# Patient Record
Sex: FEMALE | Race: WHITE | Employment: FULL TIME | URBAN - METROPOLITAN AREA
[De-identification: names, ages, dates, MRNs, and addresses within clinical notes are randomized per-mention and may not be internally consistent; named-entity substitution may affect disease eponyms.]

---

## 2017-01-03 ENCOUNTER — VIRTUAL VISIT (OUTPATIENT)
Dept: FAMILY MEDICINE | Facility: CLINIC | Age: 32
End: 2017-01-03
Payer: COMMERCIAL

## 2017-01-03 DIAGNOSIS — F41.1 GAD (GENERALIZED ANXIETY DISORDER): Primary | ICD-10-CM

## 2017-01-03 PROCEDURE — 99441 ZZC PHYSICIAN TELEPHONE EVALUATION 5-10 MIN: CPT | Performed by: FAMILY MEDICINE

## 2017-01-03 ASSESSMENT — ANXIETY QUESTIONNAIRES
6. BECOMING EASILY ANNOYED OR IRRITABLE: NOT AT ALL
3. WORRYING TOO MUCH ABOUT DIFFERENT THINGS: SEVERAL DAYS
7. FEELING AFRAID AS IF SOMETHING AWFUL MIGHT HAPPEN: NOT AT ALL
1. FEELING NERVOUS, ANXIOUS, OR ON EDGE: SEVERAL DAYS
GAD7 TOTAL SCORE: 3
2. NOT BEING ABLE TO STOP OR CONTROL WORRYING: NOT AT ALL
IF YOU CHECKED OFF ANY PROBLEMS ON THIS QUESTIONNAIRE, HOW DIFFICULT HAVE THESE PROBLEMS MADE IT FOR YOU TO DO YOUR WORK, TAKE CARE OF THINGS AT HOME, OR GET ALONG WITH OTHER PEOPLE: NOT DIFFICULT AT ALL
5. BEING SO RESTLESS THAT IT IS HARD TO SIT STILL: NOT AT ALL

## 2017-01-03 ASSESSMENT — PATIENT HEALTH QUESTIONNAIRE - PHQ9: 5. POOR APPETITE OR OVEREATING: SEVERAL DAYS

## 2017-01-03 NOTE — PATIENT INSTRUCTIONS
Thank you for choosing [facility].  You may be receiving a survey in the mail from Femi Gregory regarding your visit today.  Please take a few minutes to complete and return the survey to let us know how we are doing.      If you have questions or concerns, please contact us via [employer] or you can contact your care team at [phone].    Our Clinic hours are:  Monday 6:40 am  to 7:00 pm  Tuesday -Friday 6:40 am to 5:00 pm    The [place] outpatient lab hours are:  Monday - Friday 6:10 am to 4:45 pm  Saturdays 7:00 am to 11:00 am  Appointments are required, call [phone]    If you have clinical questions after hours or would like to schedule an appointment,  call the clinic at [phone].

## 2017-01-03 NOTE — PROGRESS NOTES
SUBJECTIVE:                                                    Grace Dominguez is 31 year old female   Chief Complaint   Patient presents with     Refill Request     medications pending     Recheck Medication     Sertraline     Medication Followup of Sertraline    Taking Medication as prescribed: yes    Side Effects:  None.  A little tired on the 25mg    Medication Helping Symptoms:  yes     Two weeks ago started Zoloft 25mg for mood and checking in today for mood and side effects.    Problem list and histories reviewed & adjusted, as indicated.  Patient Active Problem List    Diagnosis Date Noted     ARDEN (generalized anxiety disorder) 12/26/2016     Priority: Medium     mirena IUD 11/28/2016     Priority: Medium     Mirena IUD placed today 11/28/16 by Faith Miguel MD  LOT# [id number]  Exp: 08/19         Pityriasis rosea 10/14/2016     Priority: Medium     Family history of congenital anomaly 04/15/2016     Priority: Medium     Daughter had hydrocephalus and developmental delay related to infection       Mild dysplasia of cervix 04/15/2016     Priority: Medium     10/14/09 LSIL  2/23/10 [place]: BRITTNY 1.   4/15/16 LSIL/ + HR HPV @ age 31. 12w1d pregnant. Plan colp   5/13/16: [place] visually normal, no biopsies taken. Plan cotest at 1 year.   12/20/16:Pap--NIL, neg HPV. Plan: pap and HPV due in 3 years.        Current Outpatient Prescriptions   Medication     sertraline (ZOLOFT) 50 MG tablet     Prenatal Vit-Fe Fumarate-FA (PRENATAL MULTIVITAMIN  PLUS IRON) 27-0.8 MG TABS     No current facility-administered medications for this visit.      No Active Allergies  Assessment and Plan   Grace was seen today for refill request and recheck medication.    Diagnoses and all orders for this visit:    ARDEN (generalized anxiety disorder): improving  -continue zoloft and increase to 50mg daily  Can switch to take medication at bedtime    Total of 5 minutes was spent on phone visit with patient. Options for treatment and/or follow-up  care were reviewed with the patient. Patient was engaged and actively involved in the decision making process. She verbalized understanding of the options discussed and was satisfied with the final plan.    Return to clinic in 3-4 weeks for medication dosing  Napoleon White MD  [facility]

## 2017-01-04 ASSESSMENT — PATIENT HEALTH QUESTIONNAIRE - PHQ9: SUM OF ALL RESPONSES TO PHQ QUESTIONS 1-9: 2

## 2017-01-04 ASSESSMENT — ANXIETY QUESTIONNAIRES: GAD7 TOTAL SCORE: 3

## 2017-02-27 ENCOUNTER — MYC REFILL (OUTPATIENT)
Dept: FAMILY MEDICINE | Facility: CLINIC | Age: 32
End: 2017-02-27

## 2017-02-27 DIAGNOSIS — F41.1 GAD (GENERALIZED ANXIETY DISORDER): ICD-10-CM

## 2017-02-28 NOTE — TELEPHONE ENCOUNTER
Message from [employer]:  Original authorizing provider: MD Milo Velizen CORDELL Dominguez would like a refill of the following medications:  sertraline (ZOLOFT) 50 MG tablet [Napoleon White MD]    Preferred pharmacy: [facility] [address] [place] [address] AT [facility] OF [address] [place] [address]    Comment:  I am near the end of my final refill for this medication, and would like to have additional refills sent to the [facility] if possible!

## 2017-03-28 ENCOUNTER — HOSPITAL ENCOUNTER (EMERGENCY)
Facility: CLINIC | Age: 32
Discharge: HOME | End: 2017-03-28
Attending: PHYSICIAN ASSISTANT | Admitting: PHYSICIAN ASSISTANT
Payer: COMMERCIAL

## 2017-03-28 ENCOUNTER — APPOINTMENT (OUTPATIENT)
Dept: GENERAL RADIOLOGY | Facility: CLINIC | Age: 32
End: 2017-03-28
Attending: PHYSICIAN ASSISTANT
Payer: COMMERCIAL

## 2017-03-28 VITALS
RESPIRATION RATE: 16 BRPM | TEMPERATURE: 97.6 F | HEART RATE: 79 BPM | OXYGEN SATURATION: 98 % | SYSTOLIC BLOOD PRESSURE: 138 MMHG | DIASTOLIC BLOOD PRESSURE: 85 MMHG | BODY MASS INDEX: 25.75 KG/M2 | WEIGHT: 150 LBS

## 2017-03-28 DIAGNOSIS — J06.9 VIRAL URI WITH COUGH: Primary | ICD-10-CM

## 2017-03-28 PROCEDURE — 99213 OFFICE O/P EST LOW 20 MIN: CPT | Mod: 25

## 2017-03-28 PROCEDURE — 71020 XR CHEST 2 VW: CPT

## 2017-03-28 PROCEDURE — 99213 OFFICE O/P EST LOW 20 MIN: CPT | Performed by: PHYSICIAN ASSISTANT

## 2017-03-28 RX ORDER — ALBUTEROL SULFATE 90 UG/1
2 AEROSOL, METERED RESPIRATORY (INHALATION) EVERY 4 HOURS PRN
Qty: 1 INHALER | Refills: 0 | Status: SHIPPED | OUTPATIENT
Start: 2017-03-28 | End: 2017-08-04

## 2017-03-28 RX ORDER — CODEINE PHOSPHATE AND GUAIFENESIN 10; 100 MG/5ML; MG/5ML
1 SOLUTION ORAL EVERY 4 HOURS PRN
Qty: 120 ML | Refills: 0 | Status: SHIPPED | OUTPATIENT
Start: 2017-03-28 | End: 2017-08-04

## 2017-03-28 NOTE — ED AVS SNAPSHOT
[facility] Emergency Department    [address] [facility] [address]    Phone:  [phone]    Fax:  [phone]                                       Grace Dominguez   MRN: [id number]    Department:  [facility] Emergency Department   Date of Visit:  3/28/2017           After Visit Summary Signature Page     I have received my discharge instructions, and my questions have been answered. I have discussed any challenges I see with this plan with the nurse or doctor.    ..........................................................................................................................................  Patient/Patient Representative Signature      ..........................................................................................................................................  Patient Representative Print Name and Relationship to Patient    ..................................................               ................................................  Date                                            Time    ..........................................................................................................................................  Reviewed by Signature/Title    ...................................................              ..............................................  Date                                                            Time

## 2017-03-28 NOTE — ED AVS SNAPSHOT
[facility] Emergency Department    [address] [facility] [address]    Phone:  [phone]    Fax:  [phone]                                       Grace Dominguez   MRN: [id number]    Department:  [facility] Emergency Department   Date of Visit:  3/28/2017           Patient Information     Date Of Birth          1985        Your diagnoses for this visit were:     Viral URI with cough        You were seen by Zaida Brunner PA-C.      Follow-up Information     Call Faith Miguel MD.    Specialty:  OB/Gyn    Why:  As needed, For persistent symptoms    Contact information:    [facility]  [address] [place] [address]  [phone]          Follow up with [facility] Emergency Department.    Specialty:  EMERGENCY MEDICINE    Why:  As needed, If symptoms worsen    Contact information:    [address] [place] [id number] [phone]    Additional information:    The medical center is located at   [address] [place] [address] (between [facility] and   [place] [address] in [place], four miles north   of [place]).        Discharge Instructions          * VIRAL RESPIRATORY ILLNESS w/ Wheezing [Adult]  You have an Upper Respiratory Illness (URI) caused by a virus. This illness is contagious during the first few days. It is spread through the air by coughing and sneezing or by direct contact (touching the sick person and then touching your own eyes, nose or mouth). When the infection causes a lot of irritation, the air passages can go into spasm. This causes wheezing and shortness of breath.    Most viral illnesses resolve within 7-10 days with rest and simple home remedies, although the illness may sometimes last for several weeks. Antibiotics will not kill a virus and are generally not prescribed for this condition.  HOME CARE:    If symptoms are severe, rest at home for the first 2-3 days. When resuming activity, don't let yourself become overly tired.    Avoid  exposure to cigarette smoke (yours or others).    You may use acetaminophen (Tylenol) 650-1000 mg every 6 hours or ibuprofen (Motrin, Advil) 600 mg every 6-8 hours with food to control pain, if you are able to take these medicines. [ NOTE : If you have chronic liver or kidney disease or ever had a stomach ulcer or GI bleeding, talk with your doctor before using these medicines.] (Aspirin should never be used in anyone under 18 years of age who is ill with a fever. It may cause severe liver damage.    Your appetite may be poor so a light diet is fine. Avoid dehydration by drinking 6-8 glasses of fluids per day (water, soft drinks, juices, tea, soup, etc.). Extra fluids will help loosen secretions in the nose and lungs.    Over-the-counter cold medicines will not shorten the duration of the illness, but may be helpful for the following symptoms: cough (Robitussin DM); sore throat (Chloraseptic lozenges or spray); nasal and sinus congestion (Actifed or Sudafed). [NOTE: Do not use decongestants if you have high blood pressure.]  FOLLOW UP with your doctor or as advised if you are not improving over the next week.  GET PROMPT MEDICAL ATTENTION if any of the following occur:    Cough with lots of colored sputum (mucus) or blood in your sputum    Chest pain, shortness of breath, wheezing or difficulty breathing    Severe headache; face, neck or ear pain    Fever over 101 F (38.3 C) for more than three days    Unable to swallow due to throat pain    2372-8059 [place] [address]. All rights reserved. This information is not intended as a substitute for professional medical care. Always follow your healthcare professional's instructions.      24 Hour Appointment Hotline       To make an appointment at any [facility], call [phone] ([phone]). If you don't have a family doctor or clinic, we will help you find one. [facility] are conveniently located to serve the  needs of you and your family.             Review of your medicines      START taking        Dose / Directions Last dose taken    albuterol 108 (90 BASE) MCG/ACT Inhaler   Commonly known as:  albuterol   Dose:  2 puff   Quantity:  1 Inhaler        Inhale 2 puffs into the lungs every 4 hours as needed for shortness of breath / dyspnea   Refills:  0        guaiFENesin-codeine 100-10 MG/5ML Soln solution   Commonly known as:  ROBITUSSIN AC   Dose:  1 tsp.   Quantity:  120 mL        Take 5 mLs by mouth every 4 hours as needed for cough   Refills:  0          Our records show that you are taking the medicines listed below. If these are incorrect, please call your family doctor or clinic.        Dose / Directions Last dose taken    sertraline 50 MG tablet   Commonly known as:  ZOLOFT   Quantity:  30 tablet        Take 1/2 tablet (25 mg) for 1-2 weeks, then increase to 1 tablet orally daily   Refills:  1                Prescriptions were sent or printed at these locations (2 Prescriptions)                   [address]   [address] [place] [address]    Telephone:  [phone]   Fax:  [phone]   Hours:                  E-Prescribed (1 of 2)         albuterol (ALBUTEROL) 108 (90 BASE) MCG/ACT Inhaler                 Printed at Department/Unit printer (1 of 2)         guaiFENesin-codeine (ROBITUSSIN AC) 100-10 MG/5ML SOLN solution                Procedures and tests performed during your visit     XR Chest 2 Views      Orders Needing Specimen Collection     None      Pending Results     No orders found from 3/26/2017 to 3/29/2017.            Pending Culture Results     No orders found from 3/26/2017 to 3/29/2017.             Test Results from your hospital stay     3/28/2017  9:29 PM - Interface, Radiant Ib      Narrative     XR CHEST 2 VW 3/28/2017 9:28 PM    HISTORY: cough, shortness of breath        Impression     IMPRESSION: Negative.    JUDAH JOHNSON MD                 Thank you for choosing [facility]       Thank you for choosing [facility] for your care. Our goal is always to provide you with excellent care. Hearing back from our patients is one way we can continue to improve our services. Please take a few minutes to complete the written survey that you may receive in the mail after you visit with us. Thank you!        [email or web address] Information     [employer] gives you secure access to your electronic health record. If you see a primary care provider, you can also send messages to your care team and make appointments. If you have questions, please call your primary care clinic.  If you do not have a primary care provider, please call [phone] and they will assist you.        Care EveryWhere ID     This is your Care EveryWhere ID. This could be used by other organizations to access your [facility] medical records  [id number]        After Visit Summary       This is your record. Keep this with you and show to your community pharmacist(s) and doctor(s) at your next visit.

## 2017-03-29 NOTE — ED NOTES
Patient reports cough for fours days with nasal congestion, body aches, skin hurts and chills. Today she has felt mildly short of breath with dizziness.

## 2017-03-29 NOTE — DISCHARGE INSTRUCTIONS
* VIRAL RESPIRATORY ILLNESS w/ Wheezing [Adult]  You have an Upper Respiratory Illness (URI) caused by a virus. This illness is contagious during the first few days. It is spread through the air by coughing and sneezing or by direct contact (touching the sick person and then touching your own eyes, nose or mouth). When the infection causes a lot of irritation, the air passages can go into spasm. This causes wheezing and shortness of breath.    Most viral illnesses resolve within 7-10 days with rest and simple home remedies, although the illness may sometimes last for several weeks. Antibiotics will not kill a virus and are generally not prescribed for this condition.  HOME CARE:    If symptoms are severe, rest at home for the first 2-3 days. When resuming activity, don't let yourself become overly tired.    Avoid exposure to cigarette smoke (yours or others).    You may use acetaminophen (Tylenol) 650-1000 mg every 6 hours or ibuprofen (Motrin, Advil) 600 mg every 6-8 hours with food to control pain, if you are able to take these medicines. [ NOTE : If you have chronic liver or kidney disease or ever had a stomach ulcer or GI bleeding, talk with your doctor before using these medicines.] (Aspirin should never be used in anyone under 18 years of age who is ill with a fever. It may cause severe liver damage.    Your appetite may be poor so a light diet is fine. Avoid dehydration by drinking 6-8 glasses of fluids per day (water, soft drinks, juices, tea, soup, etc.). Extra fluids will help loosen secretions in the nose and lungs.    Over-the-counter cold medicines will not shorten the duration of the illness, but may be helpful for the following symptoms: cough (Robitussin DM); sore throat (Chloraseptic lozenges or spray); nasal and sinus congestion (Actifed or Sudafed). [NOTE: Do not use decongestants if you have high blood pressure.]  FOLLOW UP with your doctor or as advised if you are not improving over the next  week.  GET PROMPT MEDICAL ATTENTION if any of the following occur:    Cough with lots of colored sputum (mucus) or blood in your sputum    Chest pain, shortness of breath, wheezing or difficulty breathing    Severe headache; face, neck or ear pain    Fever over 101 F (38.3 C) for more than three days    Unable to swallow due to throat pain    4465-3998 [employer] [address], [place] [id number]. All rights reserved. This information is not intended as a substitute for professional medical care. Always follow your healthcare professional's instructions.

## 2017-03-29 NOTE — ED PROVIDER NOTES
History     Chief Complaint   Patient presents with     Cough     HPI  Grace Dominguez is a 32 year old female who presents with complaints of cough for the past 3 days.  Patient states over the past day she has become more short-of-breath and feels like she cannot take a full breath.  Denies any associated chest pain.  She has not noted any fevers at home but does note some associated congestion.  She states her cough is worse at night.  Patient's twin children have been ill with similar symptoms over the past week.  She denies history of asthma or any underlying lung disease.  She is a former smoker.    I have reviewed the Medications, Allergies, Past Medical and Surgical History, and Social History in the Epic system.    Review of Systems   Constitutional: Negative.  Negative for fever.   HENT: Positive for congestion.    Respiratory: Positive for cough and shortness of breath.    Cardiovascular: Negative.  Negative for chest pain.   Musculoskeletal: Negative.    Skin: Negative.    All other systems reviewed and are negative.      Physical Exam   BP: 138/85  Pulse: 79  Temp: 97.6  F (36.4  C)  Resp: 16  Weight: 68 kg (150 lb)  SpO2: 98 %  Physical Exam   Constitutional: She is oriented to person, place, and time. She appears well-developed and well-nourished. No distress.   HENT:   Head: Normocephalic and atraumatic.   Right Ear: Tympanic membrane, external ear and ear canal normal.   Left Ear: Tympanic membrane, external ear and ear canal normal.   Nose: Mucosal edema present.   Mouth/Throat: Uvula is midline, oropharynx is clear and moist and mucous membranes are normal. No uvula swelling. No oropharyngeal exudate, posterior oropharyngeal edema, posterior oropharyngeal erythema or tonsillar abscesses.   Eyes: Conjunctivae and EOM are normal. Pupils are equal, round, and reactive to light.   Neck: Normal range of motion and full passive range of motion without pain. Neck supple. No rigidity. Normal range of  motion present.   Cardiovascular: Normal rate, regular rhythm and normal heart sounds.    Pulmonary/Chest: Effort normal and breath sounds normal. No respiratory distress. She has no wheezes. She has no rales.   Lymphadenopathy:     She has no cervical adenopathy.   Neurological: She is alert and oriented to person, place, and time.   Skin: Skin is warm and dry.       ED Course     ED Course     Procedures    Results for orders placed or performed during the hospital encounter of 03/28/17   XR Chest 2 Views    Narrative    XR CHEST 2 VW 3/28/2017 9:28 PM    HISTORY: cough, shortness of breath      Impression    IMPRESSION: Negative.    JUDAH JOHNSON MD       Assessments & Plan (with Medical Decision Making)     Pt is a 32 year old female who presents with complaints of cough for the past 3 days.  Patient states over the past day she has become more short-of-breath and feels like she cannot take a full breath.  Denies any associated chest pain.  She has not noted any fevers at home but does note some associated congestion.  She states her cough is worse at night.  Patient's twin children have been ill with similar symptoms over the past week.  Pt is afebrile on arrival.  Exam as above.  CXR was negative.  Suspect viral infection.  Hand-outs provided.    Patient was sent with Albuterol inhaler and Robitussin AC and was instructed to follow-up with PCP if no improvement in 5-7 days for continued care and management or sooner if new or worsening symptoms.  She is to return to the ED for persistent and/or worsening symptoms.  Patient expressed understanding of the diagnosis and plan and was discharged home in good condition.    I have reviewed the nursing notes.    I have reviewed the findings, diagnosis, plan and need for follow up with the patient.    Discharge Medication List as of 3/28/2017  9:37 PM      START taking these medications    Details   albuterol (ALBUTEROL) 108 (90 BASE) MCG/ACT Inhaler Inhale 2 puffs  into the lungs every 4 hours as needed for shortness of breath / dyspnea, Disp-1 Inhaler, R-0, E-Prescribe      guaiFENesin-codeine (ROBITUSSIN AC) 100-10 MG/5ML SOLN solution Take 5 mLs by mouth every 4 hours as needed for cough, Disp-120 mL, R-0, Local Print             Final diagnoses:   Viral URI with cough       3/28/2017   [facility] EMERGENCY DEPARTMENT     Zaida Brunner PA-C  03/31/17 [id number]

## 2017-03-31 ASSESSMENT — ENCOUNTER SYMPTOMS
SHORTNESS OF BREATH: 1
MUSCULOSKELETAL NEGATIVE: 1
COUGH: 1
CARDIOVASCULAR NEGATIVE: 1
FEVER: 0
CONSTITUTIONAL NEGATIVE: 1

## 2017-08-04 ENCOUNTER — OFFICE VISIT (OUTPATIENT)
Dept: FAMILY MEDICINE | Facility: CLINIC | Age: 32
End: 2017-08-04
Payer: COMMERCIAL

## 2017-08-04 VITALS
SYSTOLIC BLOOD PRESSURE: 122 MMHG | WEIGHT: 155.4 LBS | HEART RATE: 92 BPM | BODY MASS INDEX: 26.67 KG/M2 | DIASTOLIC BLOOD PRESSURE: 78 MMHG | TEMPERATURE: 98 F

## 2017-08-04 DIAGNOSIS — K64.4 EXTERNAL HEMORRHOIDS: ICD-10-CM

## 2017-08-04 DIAGNOSIS — R10.13 EPIGASTRIC PAIN: ICD-10-CM

## 2017-08-04 DIAGNOSIS — R10.11 RUQ ABDOMINAL PAIN: Primary | ICD-10-CM

## 2017-08-04 DIAGNOSIS — R19.7 DIARRHEA, UNSPECIFIED TYPE: ICD-10-CM

## 2017-08-04 LAB
ALBUMIN SERPL-MCNC: 4.1 G/DL (ref 3.4–5)
ALP SERPL-CCNC: 57 U/L (ref 40–150)
ALT SERPL W P-5'-P-CCNC: 23 U/L (ref 0–50)
AST SERPL W P-5'-P-CCNC: 13 U/L (ref 0–45)
BILIRUB DIRECT SERPL-MCNC: <0.1 MG/DL (ref 0–0.2)
BILIRUB SERPL-MCNC: 0.4 MG/DL (ref 0.2–1.3)
LIPASE SERPL-CCNC: 160 U/L (ref 73–393)
PROT SERPL-MCNC: 7.1 G/DL (ref 6.8–8.8)
WBC # BLD AUTO: 4.9 10E9/L (ref 4–11)

## 2017-08-04 PROCEDURE — 83690 ASSAY OF LIPASE: CPT | Performed by: NURSE PRACTITIONER

## 2017-08-04 PROCEDURE — 99214 OFFICE O/P EST MOD 30 MIN: CPT | Performed by: NURSE PRACTITIONER

## 2017-08-04 PROCEDURE — 36415 COLL VENOUS BLD VENIPUNCTURE: CPT | Performed by: NURSE PRACTITIONER

## 2017-08-04 PROCEDURE — 85048 AUTOMATED LEUKOCYTE COUNT: CPT | Performed by: NURSE PRACTITIONER

## 2017-08-04 PROCEDURE — 80076 HEPATIC FUNCTION PANEL: CPT | Performed by: NURSE PRACTITIONER

## 2017-08-04 NOTE — PATIENT INSTRUCTIONS
Labs: liver, lipase, wbc, stool testing to rule out infectious diarrhea    For stomach pain try Prilosec 20 mg daily morning    Anusol cream twice daily for up to 2 weeks as needed for rectal pain, hemorrhoids    Try to increase fiber intake, or take fiber supplement

## 2017-08-04 NOTE — NURSING NOTE
"Chief Complaint   Patient presents with     Abdominal Pain     nausea pain in upper abdomin upon eating for about 6 months and over the last week she has experienced lower abdominal cramping diarrhea with occasional bright red blood. Bumps on rectum. Symptoms seem to have started after she had her twins in october.        Initial /78 (BP Location: Right arm, Patient Position: Chair, Cuff Size: Adult Regular)  Pulse 92  Temp 98  F (36.7  C) (Tympanic)  Wt 155 lb 6.4 oz (70.5 kg)  LMP 07/19/2017 (Approximate)  BMI 26.67 kg/m2 Estimated body mass index is 26.67 kg/(m^2) as calculated from the following:    Height as of 12/20/16: 5' 4\" (1.626 m).    Weight as of this encounter: 155 lb 6.4 oz (70.5 kg).  Medication Reconciliation: complete   Bettie Vaca CMA    "

## 2017-08-04 NOTE — MR AVS SNAPSHOT
After Visit Summary   8/4/2017    Grace Dominguez    MRN: [id number]           Patient Information     Date Of Birth          1985        Visit Information        Provider Department      8/4/2017 6:40 AM Esther Pearson APRN CNP [facility]        Today's Diagnoses     RUQ abdominal pain    -  1    Epigastric pain        Diarrhea, unspecified type        External hemorrhoids          Care Instructions    Labs: liver, lipase, wbc, stool testing to rule out infectious diarrhea    For stomach pain try Prilosec 20 mg daily morning    Anusol cream twice daily for up to 2 weeks as needed for rectal pain, hemorrhoids    Try to increase fiber intake, or take fiber supplement               Follow-ups after your visit        Future tests that were ordered for you today     Open Future Orders        Priority Expected Expires Ordered    Enteric Bacteria and Virus Panel by LAURO Stool Routine  8/4/2018 8/4/2017    Clostridium difficile toxin B PCR Routine 8/4/2017 8/18/2017 8/4/2017            Who to contact     If you have questions or need follow up information about today's clinic visit or your schedule please contact [facility] directly at [phone].  Normal or non-critical lab and imaging results will be communicated to you by Shopflickhart, letter or phone within 4 business days after the clinic has received the results. If you do not hear from us within 7 days, please contact the clinic through SalesLoftt or phone. If you have a critical or abnormal lab result, we will notify you by phone as soon as possible.  Submit refill requests through Shoozy or call your pharmacy and they will forward the refill request to us. Please allow 3 business days for your refill to be completed.          Additional Information About Your Visit        Shopflickhart Information     Shoozy gives you secure access to your electronic health record. If you see a primary care provider, you can also send  messages to your care team and make appointments. If you have questions, please call your primary care clinic.  If you do not have a primary care provider, please call [phone] and they will assist you.        Care EveryWhere ID     This is your Care EveryWhere ID. This could be used by other organizations to access your [facility] medical records  [id number]        Your Vitals Were     Pulse Temperature Last Period BMI (Body Mass Index)          92 98  F (36.7  C) (Tympanic) 07/19/2017 (Approximate) 26.67 kg/m2         Blood Pressure from Last 3 Encounters:   08/04/17 122/78   03/28/17 138/85   12/20/16 113/81    Weight from Last 3 Encounters:   08/04/17 155 lb 6.4 oz (70.5 kg)   03/28/17 150 lb (68 kg)   12/20/16 157 lb 6.4 oz (71.4 kg)              We Performed the Following     Hepatic panel     Lipase     WBC count          Today's Medication Changes          These changes are accurate as of: 8/4/17  7:08 AM.  If you have any questions, ask your nurse or doctor.               Start taking these medicines.        Dose/Directions    hydrocortisone 2.5 % cream   Commonly known as:  ANUSOL-HC   Used for:  External hemorrhoids   Started by:  Esther Pearson APRN CNP        Place rectally 2 times daily   Quantity:  30 g   Refills:  1            Where to get your medicines      [facility] [address] [place] [address] [address] AT [facility] OF [address] [place] [address]  [address] [facility] [address]     Phone:  [phone]     hydrocortisone 2.5 % cream                Primary Care Provider Office Phone # Fax #    Faith Miguel [phone]       [facility] [address] [facility] [address]        Equal Access to Services     LILIANA TREVIZO : Asim Arriaga, washaron lulaurita, qaybta kaalmaedmund santos, cherelle gupta. So [facility] [phone].    ATENCIÓN: Si edward espjose, celi bagley awad  disposición servicios gratuitos de asistencia lingüística. Isidro larry [phone].    We comply with applicable federal civil rights laws and [place] laws. We do not discriminate on the basis of race, color, national origin, age, disability sex, sexual orientation or gender identity.            Thank you!     Thank you for choosing [facility]  for your care. Our goal is always to provide you with excellent care. Hearing back from our patients is one way we can continue to improve our services. Please take a few minutes to complete the written survey that you may receive in the mail after your visit with us. Thank you!             Your Updated Medication List - Protect others around you: Learn how to safely use, store and throw away your medicines at www.disposemymeds.org.          This list is accurate as of: 8/4/17  7:08 AM.  Always use your most recent med list.                   Brand Name Dispense Instructions for use Diagnosis    hydrocortisone 2.5 % cream    ANUSOL-HC    30 g    Place rectally 2 times daily    External hemorrhoids       sertraline 50 MG tablet    ZOLOFT    30 tablet    Take 1/2 tablet (25 mg) for 1-2 weeks, then increase to 1 tablet orally daily    ARDEN (generalized anxiety disorder)

## 2017-08-07 ENCOUNTER — HOSPITAL ENCOUNTER (OUTPATIENT)
Dept: ULTRASOUND IMAGING | Facility: CLINIC | Age: 32
Discharge: HOME | End: 2017-08-07
Attending: NURSE PRACTITIONER | Admitting: NURSE PRACTITIONER
Payer: COMMERCIAL

## 2017-08-07 DIAGNOSIS — R10.11 RUQ ABDOMINAL PAIN: ICD-10-CM

## 2017-08-07 PROCEDURE — 76705 ECHO EXAM OF ABDOMEN: CPT

## 2017-09-22 ENCOUNTER — APPOINTMENT (OUTPATIENT)
Dept: ULTRASOUND IMAGING | Facility: CLINIC | Age: 32
End: 2017-09-22
Attending: EMERGENCY MEDICINE
Payer: COMMERCIAL

## 2017-09-22 ENCOUNTER — APPOINTMENT (OUTPATIENT)
Dept: CT IMAGING | Facility: CLINIC | Age: 32
End: 2017-09-22
Attending: EMERGENCY MEDICINE
Payer: COMMERCIAL

## 2017-09-22 ENCOUNTER — HOSPITAL ENCOUNTER (EMERGENCY)
Facility: CLINIC | Age: 32
Discharge: HOME | End: 2017-09-22
Attending: EMERGENCY MEDICINE | Admitting: EMERGENCY MEDICINE
Payer: COMMERCIAL

## 2017-09-22 VITALS
HEIGHT: 64 IN | RESPIRATION RATE: 18 BRPM | WEIGHT: 150 LBS | OXYGEN SATURATION: 100 % | HEART RATE: 83 BPM | BODY MASS INDEX: 25.61 KG/M2 | DIASTOLIC BLOOD PRESSURE: 77 MMHG | SYSTOLIC BLOOD PRESSURE: 109 MMHG | TEMPERATURE: 98.1 F

## 2017-09-22 DIAGNOSIS — R10.31 ABDOMINAL PAIN, RIGHT LOWER QUADRANT: ICD-10-CM

## 2017-09-22 LAB
ALBUMIN SERPL-MCNC: 4.3 G/DL (ref 3.4–5)
ALBUMIN UR-MCNC: NEGATIVE MG/DL
ALP SERPL-CCNC: 58 U/L (ref 40–150)
ALT SERPL W P-5'-P-CCNC: 22 U/L (ref 0–50)
ANION GAP SERPL CALCULATED.3IONS-SCNC: 9 MMOL/L (ref 3–14)
APPEARANCE UR: CLEAR
AST SERPL W P-5'-P-CCNC: 11 U/L (ref 0–45)
BASOPHILS # BLD AUTO: 0 10E9/L (ref 0–0.2)
BASOPHILS NFR BLD AUTO: 0.2 %
BILIRUB SERPL-MCNC: 0.4 MG/DL (ref 0.2–1.3)
BILIRUB UR QL STRIP: NEGATIVE
BUN SERPL-MCNC: 12 MG/DL (ref 7–30)
CALCIUM SERPL-MCNC: 9.3 MG/DL (ref 8.5–10.1)
CHLORIDE SERPL-SCNC: 107 MMOL/L (ref 94–109)
CO2 SERPL-SCNC: 24 MMOL/L (ref 20–32)
COLOR UR AUTO: ABNORMAL
CREAT SERPL-MCNC: 0.68 MG/DL (ref 0.52–1.04)
DIFFERENTIAL METHOD BLD: NORMAL
EOSINOPHIL # BLD AUTO: 0.1 10E9/L (ref 0–0.7)
EOSINOPHIL NFR BLD AUTO: 1.3 %
ERYTHROCYTE [DISTWIDTH] IN BLOOD BY AUTOMATED COUNT: 12.4 % (ref 10–15)
GFR SERPL CREATININE-BSD FRML MDRD: >90 ML/MIN/1.7M2
GLUCOSE SERPL-MCNC: 85 MG/DL (ref 70–99)
GLUCOSE UR STRIP-MCNC: NEGATIVE MG/DL
HCG UR QL: NEGATIVE
HCT VFR BLD AUTO: 41.1 % (ref 35–47)
HGB BLD-MCNC: 13.9 G/DL (ref 11.7–15.7)
HGB UR QL STRIP: NEGATIVE
IMM GRANULOCYTES # BLD: 0 10E9/L (ref 0–0.4)
IMM GRANULOCYTES NFR BLD: 0.4 %
KETONES UR STRIP-MCNC: NEGATIVE MG/DL
LEUKOCYTE ESTERASE UR QL STRIP: NEGATIVE
LYMPHOCYTES # BLD AUTO: 3.1 10E9/L (ref 0.8–5.3)
LYMPHOCYTES NFR BLD AUTO: 37.8 %
MCH RBC QN AUTO: 29.1 PG (ref 26.5–33)
MCHC RBC AUTO-ENTMCNC: 33.8 G/DL (ref 31.5–36.5)
MCV RBC AUTO: 86 FL (ref 78–100)
MONOCYTES # BLD AUTO: 0.5 10E9/L (ref 0–1.3)
MONOCYTES NFR BLD AUTO: 5.5 %
NEUTROPHILS # BLD AUTO: 4.5 10E9/L (ref 1.6–8.3)
NEUTROPHILS NFR BLD AUTO: 54.8 %
NITRATE UR QL: NEGATIVE
PH UR STRIP: 6.5 PH (ref 5–7)
PLATELET # BLD AUTO: 236 10E9/L (ref 150–450)
POTASSIUM SERPL-SCNC: 3.4 MMOL/L (ref 3.4–5.3)
PROT SERPL-MCNC: 7.6 G/DL (ref 6.8–8.8)
RBC # BLD AUTO: 4.78 10E12/L (ref 3.8–5.2)
SODIUM SERPL-SCNC: 140 MMOL/L (ref 133–144)
SOURCE: ABNORMAL
SP GR UR STRIP: 1 (ref 1–1.03)
UROBILINOGEN UR STRIP-MCNC: NORMAL MG/DL (ref 0–2)
WBC # BLD AUTO: 8.2 10E9/L (ref 4–11)

## 2017-09-22 PROCEDURE — 74177 CT ABD & PELVIS W/CONTRAST: CPT

## 2017-09-22 PROCEDURE — 96376 TX/PRO/DX INJ SAME DRUG ADON: CPT

## 2017-09-22 PROCEDURE — 25000125 ZZHC RX 250: Performed by: EMERGENCY MEDICINE

## 2017-09-22 PROCEDURE — 80053 COMPREHEN METABOLIC PANEL: CPT | Performed by: EMERGENCY MEDICINE

## 2017-09-22 PROCEDURE — 99285 EMERGENCY DEPT VISIT HI MDM: CPT | Performed by: EMERGENCY MEDICINE

## 2017-09-22 PROCEDURE — 96375 TX/PRO/DX INJ NEW DRUG ADDON: CPT

## 2017-09-22 PROCEDURE — 93976 VASCULAR STUDY: CPT | Mod: XS

## 2017-09-22 PROCEDURE — 25000128 H RX IP 250 OP 636: Performed by: EMERGENCY MEDICINE

## 2017-09-22 PROCEDURE — 81025 URINE PREGNANCY TEST: CPT | Performed by: EMERGENCY MEDICINE

## 2017-09-22 PROCEDURE — 99285 EMERGENCY DEPT VISIT HI MDM: CPT | Mod: 25

## 2017-09-22 PROCEDURE — 85025 COMPLETE CBC W/AUTO DIFF WBC: CPT | Performed by: EMERGENCY MEDICINE

## 2017-09-22 PROCEDURE — 96374 THER/PROPH/DIAG INJ IV PUSH: CPT | Mod: 59

## 2017-09-22 PROCEDURE — 81003 URINALYSIS AUTO W/O SCOPE: CPT | Performed by: EMERGENCY MEDICINE

## 2017-09-22 PROCEDURE — 76705 ECHO EXAM OF ABDOMEN: CPT

## 2017-09-22 PROCEDURE — 96361 HYDRATE IV INFUSION ADD-ON: CPT

## 2017-09-22 RX ORDER — HYDROMORPHONE HYDROCHLORIDE 1 MG/ML
0.5 INJECTION, SOLUTION INTRAMUSCULAR; INTRAVENOUS; SUBCUTANEOUS
Status: COMPLETED | OUTPATIENT
Start: 2017-09-22 | End: 2017-09-22

## 2017-09-22 RX ORDER — ONDANSETRON 4 MG/1
4 TABLET, ORALLY DISINTEGRATING ORAL EVERY 8 HOURS PRN
Qty: 7 TABLET | Refills: 0 | Status: SHIPPED | OUTPATIENT
Start: 2017-09-22 | End: 2018-01-31

## 2017-09-22 RX ORDER — ONDANSETRON 2 MG/ML
4 INJECTION INTRAMUSCULAR; INTRAVENOUS ONCE
Status: COMPLETED | OUTPATIENT
Start: 2017-09-22 | End: 2017-09-22

## 2017-09-22 RX ORDER — IOPAMIDOL 755 MG/ML
80 INJECTION, SOLUTION INTRAVASCULAR ONCE
Status: COMPLETED | OUTPATIENT
Start: 2017-09-22 | End: 2017-09-22

## 2017-09-22 RX ORDER — HYDROCODONE BITARTRATE AND ACETAMINOPHEN 5; 325 MG/1; MG/1
1 TABLET ORAL EVERY 4 HOURS PRN
Qty: 7 TABLET | Refills: 0 | Status: SHIPPED | OUTPATIENT
Start: 2017-09-22 | End: 2017-09-25

## 2017-09-22 RX ADMIN — SODIUM CHLORIDE 500 ML: 9 INJECTION, SOLUTION INTRAVENOUS at 21:22

## 2017-09-22 RX ADMIN — IOPAMIDOL 80 ML: 755 INJECTION, SOLUTION INTRAVENOUS at 22:05

## 2017-09-22 RX ADMIN — HYDROMORPHONE HYDROCHLORIDE 0.5 MG: 1 INJECTION, SOLUTION INTRAMUSCULAR; INTRAVENOUS; SUBCUTANEOUS at 21:24

## 2017-09-22 RX ADMIN — SODIUM CHLORIDE 60 ML: 9 INJECTION, SOLUTION INTRAVENOUS at 22:05

## 2017-09-22 RX ADMIN — HYDROMORPHONE HYDROCHLORIDE 0.5 MG: 1 INJECTION, SOLUTION INTRAMUSCULAR; INTRAVENOUS; SUBCUTANEOUS at 22:49

## 2017-09-22 RX ADMIN — ONDANSETRON 4 MG: 2 INJECTION INTRAMUSCULAR; INTRAVENOUS at 21:23

## 2017-09-22 ASSESSMENT — ENCOUNTER SYMPTOMS
CONSTITUTIONAL NEGATIVE: 1
ABDOMINAL PAIN: 1
CARDIOVASCULAR NEGATIVE: 1
ENDOCRINE NEGATIVE: 1
ALLERGIC/IMMUNOLOGIC NEGATIVE: 1
MUSCULOSKELETAL NEGATIVE: 1
NEUROLOGICAL NEGATIVE: 1
HEMATOLOGIC/LYMPHATIC NEGATIVE: 1
RESPIRATORY NEGATIVE: 1
EYES NEGATIVE: 1
PSYCHIATRIC NEGATIVE: 1

## 2017-09-22 NOTE — ED AVS SNAPSHOT
[facility]    [address] [facility] [address]    Phone:  [phone]    Fax:  [phone]                                       Grace Dominguez   MRN: [id number]    Department:  [facility]   Date of Visit:  9/22/2017           Patient Information     Date Of Birth          1985        Your diagnoses for this visit were:     Abdominal pain, right lower quadrant        You were seen by Ghassan Waddell MD.      Follow-up Information     Follow up with Faith Miguel MD. Schedule an appointment as soon as possible for a visit in 3 days.    Specialty:  OB/Gyn    Why:  As needed, If symptoms worsen    Contact information:    [address] [place] [address]  [phone]          Follow up with [facility].    Specialty:  EMERGENCY MEDICINE    Why:  As needed, If symptoms worsen including fever, bloody stools, worsening pain despite tylenol and or ibuprofen, supportive care    Contact information:    [address] [place] [address] [phone]    Additional information:    The medical center is located at   [address] [facility] ([address]   [facility] in [place], [place] miles north   of [place]).      Discharge References/Attachments     ABDOMINAL PAIN, UNKNOWN CAUSE, (FEMALE) (ENGLISH)    ONDANSETRON ORAL DISINTEGRATING TABLET (ENGLISH)      24 Hour Appointment Hotline       To make an appointment at any [facility] clinic, call [phone] ([phone]). If you don't have a family doctor or clinic, we will help you find one. [facility] clinics are conveniently located to serve the needs of you and your family.             Review of your medicines      START taking        Dose / Directions Last dose taken    HYDROcodone-acetaminophen 5-325 MG per tablet   Commonly known as:  NORCO   Dose:  1 tablet   Quantity:  7 tablet        Take 1 tablet by mouth every 4 hours as needed   Refills:  0         ondansetron 4 MG ODT tab   Commonly known as:  ZOFRAN ODT   Dose:  4 mg   Quantity:  7 tablet        Take 1 tablet (4 mg) by mouth every 8 hours as needed   Refills:  0          Our records show that you are taking the medicines listed below. If these are incorrect, please call your family doctor or clinic.        Dose / Directions Last dose taken    hydrocortisone 2.5 % cream   Commonly known as:  ANUSOL-HC   Quantity:  30 g        Place rectally 2 times daily   Refills:  1        sertraline 50 MG tablet   Commonly known as:  ZOLOFT   Quantity:  30 tablet        Take 1/2 tablet (25 mg) for 1-2 weeks, then increase to 1 tablet orally daily   Refills:  1                Prescriptions were sent or printed at these locations (2 Prescriptions)                   Other Prescriptions                Printed at Department/Unit printer (2 of 2)         HYDROcodone-acetaminophen (NORCO) 5-325 MG per tablet               ondansetron (ZOFRAN ODT) 4 MG ODT tab                Procedures and tests performed during your visit     CBC with platelets, differential    CT Abdomen Pelvis w Contrast    Comprehensive metabolic panel    HCG qualitative urine    Pelvic Ultrasound (US) with doppler imaging (r/o ovarian torsion)    UA reflex to Microscopic    US Abdomen Limited      Orders Needing Specimen Collection     None      Pending Results     No orders found from 9/20/2017 to 9/23/2017.            Pending Culture Results     No orders found from 9/20/2017 to 9/23/2017.            Pending Results Instructions     If you had any lab results that were not finalized at the time of your Discharge, you can call the ED Lab Result RN at [phone]. You will be contacted by this team for any positive Lab results or changes in treatment. The nurses are available 7 days a week from 10A to 6:30P.  You can leave a message 24 hours per day and they will return your call.        Test Results From Your Hospital Stay        9/22/2017  7:12 PM       Component Results     Component Value Ref Range & Units Status    Color Urine Straw  Final    Appearance Urine Clear  Final    Glucose Urine Negative NEG^Negative mg/dL Final    Bilirubin Urine Negative NEG^Negative Final    Ketones Urine Negative NEG^Negative mg/dL Final    Specific Gravity Urine 1.000 (L) 1.003 - 1.035 Final    Blood Urine Negative NEG^Negative Final    pH Urine 6.5 5.0 - 7.0 pH Final    Protein Albumin Urine Negative NEG^Negative mg/dL Final    Urobilinogen mg/dL Normal 0.0 - 2.0 mg/dL Final    Nitrite Urine Negative NEG^Negative Final    Leukocyte Esterase Urine Negative NEG^Negative Final    Source Midstream Urine  Final         9/22/2017  7:15 PM      Component Results     Component Value Ref Range & Units Status    HCG Qual Urine Negative NEG^Negative Final    This test is for screening purposes.  Results should be interpreted along with   the clinical picture.  Confirmation testing is available if warranted by   ordering [id number], HCG Quantitative Pregnancy.           9/22/2017  7:20 PM      Component Results     Component Value Ref Range & Units Status    WBC 8.2 4.0 - 11.0 10e9/L Final    RBC Count 4.78 3.8 - 5.2 10e12/L Final    Hemoglobin 13.9 11.7 - 15.7 g/dL Final    Hematocrit 41.1 35.0 - 47.0 % Final    MCV 86 78 - 100 fl Final    MCH 29.1 26.5 - 33.0 pg Final    MCHC 33.8 31.5 - 36.5 g/dL Final    RDW 12.4 10.0 - 15.0 % Final    Platelet Count 236 150 - 450 10e9/L Final    Diff Method Automated Method  Final    % Neutrophils 54.8 % Final    % Lymphocytes 37.8 % Final    % Monocytes 5.5 % Final    % Eosinophils 1.3 % Final    % Basophils 0.2 % Final    % Immature Granulocytes 0.4 % Final    Absolute Neutrophil 4.5 1.6 - 8.3 10e9/L Final    Absolute Lymphocytes 3.1 0.8 - 5.3 10e9/L Final    Absolute Monocytes 0.5 0.0 - 1.3 10e9/L Final    Absolute Eosinophils 0.1 0.0 - 0.7 10e9/L Final    Absolute Basophils 0.0 0.0 - 0.2 10e9/L Final    Abs Immature Granulocytes 0.0 0 - 0.4 10e9/L  Final         9/22/2017  7:32 PM      Component Results     Component Value Ref Range & Units Status    Sodium 140 133 - 144 mmol/L Final    Potassium 3.4 3.4 - 5.3 mmol/L Final    Chloride 107 94 - 109 mmol/L Final    Carbon Dioxide 24 20 - 32 mmol/L Final    Anion Gap 9 3 - 14 mmol/L Final    Glucose 85 70 - 99 mg/dL Final    Urea Nitrogen 12 7 - 30 mg/dL Final    Creatinine 0.68 0.52 - 1.04 mg/dL Final    GFR Estimate >90 >60 mL/min/1.7m2 Final    Non  GFR Calc    GFR Estimate If Black >90 >60 mL/min/1.7m2 Final    African American GFR Calc    Calcium 9.3 8.5 - 10.1 mg/dL Final    Bilirubin Total 0.4 0.2 - 1.3 mg/dL Final    Albumin 4.3 3.4 - 5.0 g/dL Final    Protein Total 7.6 6.8 - 8.8 g/dL Final    Alkaline Phosphatase 58 40 - 150 U/L Final    ALT 22 0 - 50 U/L Final    AST 11 0 - 45 U/L Final         9/22/2017  9:40 PM      Narrative     US PELVIS COMPLETE W TRANSVAGINAL AND DOPPLER LIMITED  9/22/2017 9:36  PM    HISTORY:  right lower quadrant abdominal pain and discomfort. history   of ovarian cyst. IUD and tubal in 2016 Eval for ovarian cyst vs  torsion. Please look at the appendix and evaluate for appendicitis    FINDINGS: Ultrasound was performed transvaginally as well as  transabdominally in order to optimally evaluate the adnexa.    The uterus measured 8.0 x 4.1 x 5.6 cm with an endometrial thickness  of 0.3 cm. No myometrial abnormality was demonstrated. And IUD was  noted within the endometrium. The right and left ovaries appeared  within normal limits. Doppler waveform analysis demonstrated grossly  normal blood flow to both ovaries. There was no free pelvic fluid.        Impression     IMPRESSION:  Pelvic ultrasound within normal limits. An IUD is noted  within the endometrium.    GABBI CASTRO MD         9/22/2017 10:20 PM      Narrative     LIMITED ABDOMEN ULTRASOUND  9/22/2017 9:26 PM     HISTORY: Right lower quadrant pain, rule out appendicitis.    FINDINGS: In the right lower  quadrant, the appendix was partially  visualized at the blind end. Although the appendix did not appear  compressible and tenderness was elicited over the appendix, the  appendix measured 4.3 cm in diameter, within normal limits. No fluid  was seen adjacent to the appendix.        Impression     IMPRESSION: No definitive evidence of appendicitis. However, the  patient was tender in this region.    GABBI CASTRO MD         9/22/2017 10:50 PM      Narrative     CT ABDOMEN AND PELVIS WITH CONTRAST 9/22/2017 10:07 PM     HISTORY: Right lower abdominal pain and discomfort. Evaluate for acute  appendicitis versus other acute process.    CONTRAST DOSE: 80 mL Isovue-370.    Radiation dose for this scan was reduced using automated exposure  control, adjustment of the mA and/or kV according to patient size, or  iterative reconstruction technique.    FINDINGS: There is a normal-appearing infra cecal appendix. No  pericolonic inflammatory stranding is noted. There is no evidence of  bowel obstruction. An IUD is noted within the uterus. Pelvic contents  are otherwise grossly unremarkable. The liver, spleen, adrenal glands,  pancreas, and gallbladder appear within normal limits. Nonobstructing  renal papillary stones are noted in the right lower and left upper  poles. No hydronephrosis is demonstrated.        Impression     IMPRESSION:  1. No CT evidence of an acute inflammatory process in the abdomen or  pelvis. There is a normal-appearing appendix.  2. Renal papillary right lower pole and left upper pole nonobstructing  stones.    GABBI CASTRO MD                Thank you for choosing [facility]       Thank you for choosing [facility] for your care. Our goal is always to provide you with excellent care. Hearing back from our patients is one way we can continue to improve our services. Please take a few minutes to complete the written survey that you may receive in the mail after you visit with us. Thank you!        Anitraharyoshi  Information     Walltik gives you secure access to your electronic health record. If you see a primary care provider, you can also send messages to your care team and make appointments. If you have questions, please call your primary care clinic.  If you do not have a primary care provider, please call [phone] and they will assist you.        Care EveryWhere ID     This is your Care EveryWhere ID. This could be used by other organizations to access your [facility] medical records  [id number]        Equal Access to Services     LILIANA TREVIZO : Hadii aad ku hadasho Solarissaali, waaxda luqadaha, qaybta kaalmada adeegyaedmund, cherelle [place] . So [facility] [phone].    ATENCIÓN: Si habla español, tiene a awad disposición servicios gratuitos de asistencia lingüística. Llame al [phone].    We comply with applicable federal civil rights laws and [place] laws. We do not discriminate on the basis of race, color, national origin, age, disability sex, sexual orientation or gender identity.            After Visit Summary       This is your record. Keep this with you and show to your community pharmacist(s) and doctor(s) at your next visit.

## 2017-09-22 NOTE — ED AVS SNAPSHOT
[facility]    [address] [facility] [address]    Phone:  [phone]    Fax:  [phone]                                       Grace Dominguez   MRN: [id number]    Department:  [facility]   Date of Visit:  9/22/2017           After Visit Summary Signature Page     I have received my discharge instructions, and my questions have been answered. I have discussed any challenges I see with this plan with the nurse or doctor.    ..........................................................................................................................................  Patient/Patient Representative Signature      ..........................................................................................................................................  Patient Representative Print Name and Relationship to Patient    ..................................................               ................................................  Date                                            Time    ..........................................................................................................................................  Reviewed by Signature/Title    ...................................................              ..............................................  Date                                                            Time

## 2017-09-23 NOTE — ED PROVIDER NOTES
History     Chief Complaint   Patient presents with     Abdominal Pain     low abdomen, worsening,      HPI  Grace Dominguze is a 32 year old female who presented for evaluation for lower abdominal pain and discomfort.  Patient reports a history of tubal ligation with IUD placement in 2016 after her  section.  She's had 2  sections.  No vaginal bleeding or discharge.  She is sexually active with her .  She reports pain began this morning and has progressively worsened.  She also reports nausea but no vomiting.  She reports no back pain or flank pain.  She reports no history of sexually transmitted infection and she reports no history of kidney stones.  Because of right lower quadrant abdominal pain and discomfort she arrives in the emergency department for further care.     Social history: Lives in [place], [place]. Here in ED alone by private car.    Past medical history:  Patient Active Problem List   Diagnosis     Family history of congenital anomaly     Mild dysplasia of cervix     Pityriasis rosea     mirena IUD     ARDEN (generalized anxiety disorder)     Medications:  Current Facility-Administered Medications   Medication     HYDROmorphone (PF) (DILAUDID) injection 0.5 mg     Current Outpatient Prescriptions   Medication     HYDROcodone-acetaminophen (NORCO) 5-325 MG per tablet     ondansetron (ZOFRAN ODT) 4 MG ODT tab     hydrocortisone (ANUSOL-HC) 2.5 % cream     sertraline (ZOLOFT) 50 MG tablet     Allergies:   No Known Allergies  I have reviewed the Medications, Allergies, Past Medical and Surgical History, and Social History in the Epic system.         Review of Systems   Constitutional: Negative.    HENT: Negative.    Eyes: Negative.    Respiratory: Negative.    Cardiovascular: Negative.    Gastrointestinal: Positive for abdominal pain.   Endocrine: Negative.    Genitourinary: Negative.    Musculoskeletal: Negative.    Skin: Negative.    Allergic/Immunologic: Negative.   "  Neurological: Negative.    Hematological: Negative.    Psychiatric/Behavioral: Negative.        Physical Exam   BP: 107/72  Pulse: 91  Temp: 98.1  F (36.7  C)  Resp: 18  Height: 162.6 cm (5' 4\")  Weight: 68 kg (150 lb)  SpO2: 98 %  Physical Exam   Constitutional: She is oriented to person, place, and time. She appears well-developed and well-nourished. No distress.   HENT:   Head: Normocephalic and atraumatic.   Eyes: Conjunctivae and EOM are normal. Pupils are equal, round, and reactive to light. Right eye exhibits no discharge. Left eye exhibits no discharge. No scleral icterus.   Neck: Normal range of motion. Neck supple. No JVD present. No tracheal deviation present. No thyromegaly present.   Cardiovascular: Normal rate and regular rhythm.  Exam reveals no gallop.    No murmur heard.  Pulmonary/Chest: Effort normal. No stridor.   Abdominal: Soft. There is tenderness in the right lower quadrant.       Lymphadenopathy:     She has no cervical adenopathy.   Neurological: She is alert and oriented to person, place, and time. No cranial nerve deficit. Coordination normal.   Skin: No rash noted. She is not diaphoretic. No pallor.   Psychiatric: She has a normal mood and affect. Her behavior is normal. Judgment and thought content normal.       ED Course     ED Course     Procedures             Critical Care time:  none               ED medications:  Medications   HYDROmorphone (PF) (DILAUDID) injection 0.5 mg (not administered)   HYDROmorphone (PF) (DILAUDID) injection 0.5 mg (0.5 mg Intravenous Given 9/22/17 2124)   0.9% sodium chloride BOLUS (500 mLs Intravenous New Bag 9/22/17 2122)   ondansetron (ZOFRAN) injection 4 mg (4 mg Intravenous Given 9/22/17 2123)   iopamidol (ISOVUE-370) solution 80 mL (80 mLs Intravenous Given 9/22/17 2205)   sodium chloride 0.9 % bag 500mL for CT scan flush use (60 mLs As instructed Given 9/22/17 2205)       ED labs and imaging:  Results for orders placed or performed during the " hospital encounter of 09/22/17 (from the past 24 hour(s))   UA reflex to Microscopic   Result Value Ref Range    Color Urine Straw     Appearance Urine Clear     Glucose Urine Negative NEG^Negative mg/dL    Bilirubin Urine Negative NEG^Negative    Ketones Urine Negative NEG^Negative mg/dL    Specific Gravity Urine 1.000 (L) 1.003 - 1.035    Blood Urine Negative NEG^Negative    pH Urine 6.5 5.0 - 7.0 pH    Protein Albumin Urine Negative NEG^Negative mg/dL    Urobilinogen mg/dL Normal 0.0 - 2.0 mg/dL    Nitrite Urine Negative NEG^Negative    Leukocyte Esterase Urine Negative NEG^Negative    Source Midstream Urine    HCG qualitative urine   Result Value Ref Range    HCG Qual Urine Negative NEG^Negative   CBC with platelets, differential   Result Value Ref Range    WBC 8.2 4.0 - 11.0 10e9/L    RBC Count 4.78 3.8 - 5.2 10e12/L    Hemoglobin 13.9 11.7 - 15.7 g/dL    Hematocrit 41.1 35.0 - 47.0 %    MCV 86 78 - 100 fl    MCH 29.1 26.5 - 33.0 pg    MCHC 33.8 31.5 - 36.5 g/dL    RDW 12.4 10.0 - 15.0 %    Platelet Count 236 150 - 450 10e9/L    Diff Method Automated Method     % Neutrophils 54.8 %    % Lymphocytes 37.8 %    % Monocytes 5.5 %    % Eosinophils 1.3 %    % Basophils 0.2 %    % Immature Granulocytes 0.4 %    Absolute Neutrophil 4.5 1.6 - 8.3 10e9/L    Absolute Lymphocytes 3.1 0.8 - 5.3 10e9/L    Absolute Monocytes 0.5 0.0 - 1.3 10e9/L    Absolute Eosinophils 0.1 0.0 - 0.7 10e9/L    Absolute Basophils 0.0 0.0 - 0.2 10e9/L    Abs Immature Granulocytes 0.0 0 - 0.4 10e9/L   Comprehensive metabolic panel   Result Value Ref Range    Sodium 140 133 - 144 mmol/L    Potassium 3.4 3.4 - 5.3 mmol/L    Chloride 107 94 - 109 mmol/L    Carbon Dioxide 24 20 - 32 mmol/L    Anion Gap 9 3 - 14 mmol/L    Glucose 85 70 - 99 mg/dL    Urea Nitrogen 12 7 - 30 mg/dL    Creatinine 0.68 0.52 - 1.04 mg/dL    GFR Estimate >90 >60 mL/min/1.7m2    GFR Estimate If Black >90 >60 mL/min/1.7m2    Calcium 9.3 8.5 - 10.1 mg/dL    Bilirubin Total 0.4  0.2 - 1.3 mg/dL    Albumin 4.3 3.4 - 5.0 g/dL    Protein Total 7.6 6.8 - 8.8 g/dL    Alkaline Phosphatase 58 40 - 150 U/L    ALT 22 0 - 50 U/L    AST 11 0 - 45 U/L   US Abdomen Limited    Narrative    LIMITED ABDOMEN ULTRASOUND  9/22/2017 9:26 PM     HISTORY: Right lower quadrant pain, rule out appendicitis.    FINDINGS: In the right lower quadrant, the appendix was partially  visualized at the blind end. Although the appendix did not appear  compressible and tenderness was elicited over the appendix, the  appendix measured 4.3 cm in diameter, within normal limits. No fluid  was seen adjacent to the appendix.      Impression    IMPRESSION: No definitive evidence of appendicitis. However, the  patient was tender in this region.    GABBI CASTRO MD   Pelvic Ultrasound (US) with doppler imaging (r/o ovarian torsion)    Narrative    US PELVIS COMPLETE W TRANSVAGINAL AND DOPPLER LIMITED  9/22/2017 9:36  PM    HISTORY:  right lower quadrant abdominal pain and discomfort. history   of ovarian cyst. IUD and tubal in 2016 Eval for ovarian cyst vs  torsion. Please look at the appendix and evaluate for appendicitis    FINDINGS: Ultrasound was performed transvaginally as well as  transabdominally in order to optimally evaluate the adnexa.    The uterus measured 8.0 x 4.1 x 5.6 cm with an endometrial thickness  of 0.3 cm. No myometrial abnormality was demonstrated. And IUD was  noted within the endometrium. The right and left ovaries appeared  within normal limits. Doppler waveform analysis demonstrated grossly  normal blood flow to both ovaries. There was no free pelvic fluid.      Impression    IMPRESSION:  Pelvic ultrasound within normal limits. An IUD is noted  within the endometrium.    GABBI CASTRO MD   CT Abdomen Pelvis w Contrast    Narrative    CT ABDOMEN AND PELVIS WITH CONTRAST 9/22/2017 10:07 PM     HISTORY: Right lower abdominal pain and discomfort. Evaluate for acute  appendicitis versus other acute  process.    CONTRAST DOSE: 80 mL Isovue-370.    Radiation dose for this scan was reduced using automated exposure  control, adjustment of the mA and/or kV according to patient size, or  iterative reconstruction technique.    FINDINGS: There is a normal-appearing infra cecal appendix. No  pericolonic inflammatory stranding is noted. There is no evidence of  bowel obstruction. An IUD is noted within the uterus. Pelvic contents  are otherwise grossly unremarkable. The liver, spleen, adrenal glands,  pancreas, and gallbladder appear within normal limits. Nonobstructing  renal papillary stones are noted in the right lower and left upper  poles. No hydronephrosis is demonstrated.      Impression    IMPRESSION:  1. No CT evidence of an acute inflammatory process in the abdomen or  pelvis. There is a normal-appearing appendix.  2. Renal papillary right lower pole and left upper pole nonobstructing  stones.       ED Vitals:  Vitals:    17 2210 17 2215 17 2230 17 2238   BP: 109/78  (!) 69/47 109/77   BP Location: Right arm      Pulse:       Resp: 16   18   Temp:       TempSrc:       SpO2: 92% 96% 98% 100%   Weight:       Height:           Assessments & Plan (with Medical Decision Making)   Clinical impression: Pleasant 32-year-old female 1 day history of right lower quadrant abdominal pain that is progressively worsening since this morning.  The exact cause of her pain and discomfort is unclear.   Prior history of  section ×2.  Tubal ligation with IUD in place in 2016.  Localized pain and discomfort in her right lower quadrant.  Mild voluntary guarding but no rebound.  Abdomen is soft nondistended with normal bowel sounds. Hemodynamically normal.      ED course and Plan:  She was given IV Dilaudid for pain and discomfort and Zofran for nausea.  She is also given fluids.  Pelvic ultrasound was obtained given location of pain with symptoms over the last 24 hours and a history of ovarian cyst.   Pelvic ultrasound shows no ovarian cyst no evidence for ovarian torsion.  IUD was located within the endometrium.  Please see radiologist's interpretation in detail reported above.  A CT of the abdomen and pelvis was obtained to exclude acute appendicitis given location of patient's pain and discomfort.  CT of the abdomen today shows a normal-appearing appendix and no acute inflammatory process noted in the abdomen.  She is discharged to home with right lower quadrant abdominal pain of unclear cause.  We discussed supportive care measures and watchful waiting.  We also reviewed reasons to return to the emergency department for care She expressed understanding.  She was given norco x 7 tablets for pain control and discomfort if needed. She was given zofran ODT for prn use.      Disclaimer: This note consists of symbols derived from keyboarding, dictation and/or voice recognition software. As a result, there may be errors in the script that have gone undetected. Please consider this when interpreting information found in this chart.  I have reviewed the nursing notes.    I have reviewed the findings, diagnosis, plan and need for follow up with the patient.       New Prescriptions    HYDROCODONE-ACETAMINOPHEN (NORCO) 5-325 MG PER TABLET    Take 1 tablet by mouth every 4 hours as needed    ONDANSETRON (ZOFRAN ODT) 4 MG ODT TAB    Take 1 tablet (4 mg) by mouth every 8 hours as needed       Final diagnoses:   Abdominal pain, right lower quadrant       9/22/2017   [facility] EMERGENCY DEPARTMENT     Ghassan aWddell MD  09/22/17 1689

## 2017-09-23 NOTE — ED NOTES
"gradual onset of \"deep\" \"burning\" type pain in low abd bilat but greater on right  Pain worse when \"stretched out\"  Feels slightly bloated down low  Denies urinary or bowel problems changes or concerns  Has tried tylenol today last at 1400 no ibuprofen   Denies nausea denies fever  "

## 2017-09-25 ENCOUNTER — OFFICE VISIT (OUTPATIENT)
Dept: FAMILY MEDICINE | Facility: CLINIC | Age: 32
End: 2017-09-25
Payer: COMMERCIAL

## 2017-09-25 VITALS
WEIGHT: 152.6 LBS | TEMPERATURE: 97.6 F | HEART RATE: 72 BPM | DIASTOLIC BLOOD PRESSURE: 70 MMHG | BODY MASS INDEX: 26.19 KG/M2 | SYSTOLIC BLOOD PRESSURE: 118 MMHG

## 2017-09-25 DIAGNOSIS — Z23 NEED FOR PROPHYLACTIC VACCINATION AND INOCULATION AGAINST INFLUENZA: Primary | ICD-10-CM

## 2017-09-25 DIAGNOSIS — N29 NEPHROCALCINOSIS: ICD-10-CM

## 2017-09-25 DIAGNOSIS — K21.9 GASTROESOPHAGEAL REFLUX DISEASE, ESOPHAGITIS PRESENCE NOT SPECIFIED: ICD-10-CM

## 2017-09-25 DIAGNOSIS — E83.59 NEPHROCALCINOSIS: ICD-10-CM

## 2017-09-25 LAB
ANION GAP SERPL CALCULATED.3IONS-SCNC: 7 MMOL/L (ref 3–14)
BUN SERPL-MCNC: 11 MG/DL (ref 7–30)
CALCIUM SERPL-MCNC: 9.5 MG/DL (ref 8.5–10.1)
CHLORIDE SERPL-SCNC: 108 MMOL/L (ref 94–109)
CO2 SERPL-SCNC: 26 MMOL/L (ref 20–32)
CREAT SERPL-MCNC: 0.76 MG/DL (ref 0.52–1.04)
GFR SERPL CREATININE-BSD FRML MDRD: 89 ML/MIN/1.7M2
GLUCOSE SERPL-MCNC: 84 MG/DL (ref 70–99)
MAGNESIUM SERPL-MCNC: 2 MG/DL (ref 1.6–2.3)
PHOSPHATE SERPL-MCNC: 2.6 MG/DL (ref 2.5–4.5)
POTASSIUM SERPL-SCNC: 3.9 MMOL/L (ref 3.4–5.3)
PTH-INTACT SERPL-MCNC: 10 PG/ML (ref 12–72)
SODIUM SERPL-SCNC: 141 MMOL/L (ref 133–144)

## 2017-09-25 PROCEDURE — 90471 IMMUNIZATION ADMIN: CPT | Performed by: NURSE PRACTITIONER

## 2017-09-25 PROCEDURE — 90686 IIV4 VACC NO PRSV 0.5 ML IM: CPT | Performed by: NURSE PRACTITIONER

## 2017-09-25 PROCEDURE — 83735 ASSAY OF MAGNESIUM: CPT | Performed by: NURSE PRACTITIONER

## 2017-09-25 PROCEDURE — 80048 BASIC METABOLIC PNL TOTAL CA: CPT | Performed by: NURSE PRACTITIONER

## 2017-09-25 PROCEDURE — 83970 ASSAY OF PARATHORMONE: CPT | Performed by: NURSE PRACTITIONER

## 2017-09-25 PROCEDURE — 36415 COLL VENOUS BLD VENIPUNCTURE: CPT | Performed by: NURSE PRACTITIONER

## 2017-09-25 PROCEDURE — 99214 OFFICE O/P EST MOD 30 MIN: CPT | Mod: 25 | Performed by: NURSE PRACTITIONER

## 2017-09-25 PROCEDURE — 84100 ASSAY OF PHOSPHORUS: CPT | Performed by: NURSE PRACTITIONER

## 2017-09-25 RX ORDER — IOPAMIDOL 755 MG/ML
75 INJECTION, SOLUTION INTRAVASCULAR ONCE
Status: COMPLETED | OUTPATIENT
Start: 2017-09-26 | End: 2017-09-26

## 2017-09-25 RX ORDER — HYDROCODONE BITARTRATE AND ACETAMINOPHEN 5; 325 MG/1; MG/1
1 TABLET ORAL 2 TIMES DAILY PRN
Qty: 15 TABLET | Refills: 0 | Status: SHIPPED | OUTPATIENT
Start: 2017-09-25 | End: 2017-10-04

## 2017-09-25 NOTE — MR AVS SNAPSHOT
After Visit Summary   9/25/2017    Grace Dominguez    MRN: [id number]           Patient Information     Date Of Birth          1985        Visit Information        Provider Department      9/25/2017 9:00 AM Esther Pearson APRN [facility]        Today's Diagnoses     Need for prophylactic vaccination and inoculation against influenza    -  1    Nephrocalcinosis        Gastroesophageal reflux disease, esophagitis presence not specified          Care Instructions    CT scan to evaluate kidneys    Labs to evaluate etiology of nephrocalcinosis    Will consider nephrologist, or urologist referral       Abdominal pain possible due to renal colic     Schedule EGD    Drink more fluids    Continue Prilosec 20 mg daily morning                 Follow-ups after your visit        Additional Services     GASTROENTEROLOGY ADULT REF PROCEDURE ONLY       Last Lab Result: Creatinine (mg/dL)       Date                     Value                 09/22/2017               0.68             ----------  Body mass index is 26.19 kg/(m^2).     Needed:  No  Language:  English    Patient will be contacted to schedule procedure.     Please be aware that coverage of these services is subject to the terms and limitations of your health insurance plan.  Call member services at your health plan with any benefit or coverage questions.  Any procedures must be performed at a [facility] facility OR coordinated by your clinic's referral office.    Please bring the following with you to your appointment:    (1) Any X-Rays, CTs or MRIs which have been performed.  Contact the facility where they were done to arrange for  prior to your scheduled appointment.    (2) List of current medications   (3) This referral request   (4) Any documents/labs given to you for this referral                  Future tests that were ordered for you today     Open Future Orders        Priority Expected Expires Ordered     CT Abdomen Pelvis w Contrast Routine  9/25/2018 9/25/2017            Who to contact     If you have questions or need follow up information about today's clinic visit or your schedule please contact [facility] directly at [phone].  Normal or non-critical lab and imaging results will be communicated to you by [employer]hart, letter or phone within 4 business days after the clinic has received the results. If you do not hear from us within 7 days, please contact the clinic through [employer]hart or phone. If you have a critical or abnormal lab result, we will notify you by phone as soon as possible.  Submit refill requests through [employer] or call your pharmacy and they will forward the refill request to us. Please allow 3 business days for your refill to be completed.          Additional Information About Your Visit        [employer] Information     [employer] gives you secure access to your electronic health record. If you see a primary care provider, you can also send messages to your care team and make appointments. If you have questions, please call your primary care clinic.  If you do not have a primary care provider, please call [phone] and they will assist you.        Care EveryWhere ID     This is your Care EveryWhere ID. This could be used by other organizations to access your [facility] medical records  [id number]        Your Vitals Were     Pulse Temperature BMI (Body Mass Index)             72 97.6  F (36.4  C) (Tympanic) 26.19 kg/m2          Blood Pressure from Last 3 Encounters:   09/25/17 118/70   09/22/17 109/77   08/04/17 122/78    Weight from Last 3 Encounters:   09/25/17 152 lb 9.6 oz (69.2 kg)   09/22/17 150 lb (68 kg)   08/04/17 155 lb 6.4 oz (70.5 kg)              We Performed the Following     Basic metabolic panel     FLU VAC, SPLIT VIRUS IM > 3 YO (QUADRIVALENT) [97546]     GASTROENTEROLOGY ADULT REF PROCEDURE ONLY     Magnesium     Parathyroid Hormone Intact     Phosphorus     Vaccine  Administration, Initial [89956]          Today's Medication Changes          These changes are accurate as of: 9/25/17  9:34 AM.  If you have any questions, ask your nurse or doctor.               These medicines have changed or have updated prescriptions.        Dose/Directions    HYDROcodone-acetaminophen 5-325 MG per tablet   Commonly known as:  NORCO   This may have changed:  when to take this   Used for:  Nephrocalcinosis   Changed by:  Esther Pearson APRN CNP        Dose:  1 tablet   Take 1 tablet by mouth 2 times daily as needed   Quantity:  15 tablet   Refills:  0            Where to get your medicines      Some of these will need a paper prescription and others can be bought over the counter.  Ask your nurse if you have questions.     Bring a paper prescription for each of these medications     HYDROcodone-acetaminophen 5-325 MG per tablet                Primary Care Provider Office Phone # Fax #    JUNG Ramos [phone] [address] [facility] [address]        Equal Access to Services     LILIANA TREVIZO : Hadii suraj ku hadasho Soomaali, waaxda luqadaha, qaybta kaalmada adeegyada, waxay dustinin tosha [place] . So [facility] [phone].    ATENCIÓN: Si habla español, tiene a awad disposición servicios gratuitos de asistencia lingüística. Llame al [phone].    We comply with applicable federal civil rights laws and [place] laws. We do not discriminate on the basis of race, color, national origin, age, disability sex, sexual orientation or gender identity.            Thank you!     Thank you for choosing [facility]  for your care. Our goal is always to provide you with excellent care. Hearing back from our patients is one way we can continue to improve our services. Please take a few minutes to complete the written survey that you may receive in the mail after your visit with us. Thank you!             Your Updated Medication List -  Protect others around you: Learn how to safely use, store and throw away your medicines at www.disposemymeds.org.          This list is accurate as of: 9/25/17  9:34 AM.  Always use your most recent med list.                   Brand Name Dispense Instructions for use Diagnosis    HYDROcodone-acetaminophen 5-325 MG per tablet    NORCO    15 tablet    Take 1 tablet by mouth 2 times daily as needed    Nephrocalcinosis       hydrocortisone 2.5 % cream    ANUSOL-HC    30 g    Place rectally 2 times daily    External hemorrhoids       ondansetron 4 MG ODT tab    ZOFRAN ODT    7 tablet    Take 1 tablet (4 mg) by mouth every 8 hours as needed        PRILOSEC PO      Take 10 mg by mouth daily        sertraline 50 MG tablet    ZOLOFT    30 tablet    Take 1/2 tablet (25 mg) for 1-2 weeks, then increase to 1 tablet orally daily    ARDEN (generalized anxiety disorder)

## 2017-09-25 NOTE — NURSING NOTE
"Chief Complaint   Patient presents with     Hospital F/U     E/R follow up 9/22       Initial /70  Pulse 72  Temp 97.6  F (36.4  C) (Tympanic)  Wt 152 lb 9.6 oz (69.2 kg)  BMI 26.19 kg/m2 Estimated body mass index is 26.19 kg/(m^2) as calculated from the following:    Height as of 9/22/17: 5' 4\" (1.626 m).    Weight as of this encounter: 152 lb 9.6 oz (69.2 kg).  Medication Reconciliation: complete  "

## 2017-09-25 NOTE — PROGRESS NOTES
SUBJECTIVE:   Grace Dominguez is a 32 year old female who presents to clinic today for the following health issues: right lower quadrant pain. Patient reports of bilateral lower quadrant pain right next to her caesarian section scar, which radiates upwards to flank region. Patient is also experiencing nausea and gagging to the point that she is no longer feels like eating. Patient was recently seen ED for the same problem 3 days ago. Abdominal CT at that time showed:     -non-obstructing renal papillary stones are noted in the right lower and left upper poles, however there are no measurements of the stone sizes. The pelvic ultrasound was normal at that time.     Patient was discharged with Norco and Zofran. Patient reports of Norco helpful with pain however the Zofran was not helpful in resolving generalized queezy feeling.      Patient also has a history of GERD and food getting stuck in the esophagus, she had EGD in the past and was told that she has esophageal sphincter dysfunction. She is currently taking Prilosec, which somewhat helpful.   Denies of any fever, chills, emesis, hematuria, hematochezia, shortness of breath, chest pain, and palpitation.     ED/UC Followup:    Facility:  [facility]   Date of visit: 9/22  Reason for visit: Abdominal pain   Current Status: Is Still having abdominal pain, radiating to her right side, is feeling nauseous, loss of appetite      Problem list and histories reviewed & adjusted, as indicated.  Additional history: as documented    Labs reviewed in EPIC    Reviewed and updated as needed this visit by clinical staff  Allergies       Reviewed and updated as needed this visit by Provider         ROS:  Constitutional, HEENT, cardiovascular, pulmonary, gi and gu systems are negative, except as otherwise noted.    OBJECTIVE:   /70  Pulse 72  Temp 97.6  F (36.4  C) (Tympanic)  Wt 152 lb 9.6 oz (69.2 kg)  BMI 26.19 kg/m2  Body mass index is 26.19 kg/(m^2).  GENERAL: healthy,  alert and no distress  ABDOMEN: soft, nontender, no hepatosplenomegaly, no masses and bowel sounds normal  BACK: no CVA tenderness, no paralumbar tenderness  PSYCH: mentation appears normal, affect normal/bright    Diagnostic Test Results:  Repeat CT Abdomen Pelvis w Contrast to evaluate renal stones   Ordered labs to evaluate possible etiology of nephrocalcinosis:   Magnesium  Phosphorus  Basic Metabolic Panel  Parathyroid Hormone Intact  ASSESSMENT/PLAN:     1. Need for prophylactic vaccination and inoculation against influenza  - FLU VAC, SPLIT VIRUS IM > 3 YO (QUADRIVALENT) [84488]  - Vaccine Administration, Initial [07759]    2. Nephrocalcinosis  Abdominal CT from 2 days ago shows non-obstructing renal papillary stones are noted in the right lower and left upper poles. Pain is most likely renal colic radiating to the abdomen. Will repeat abdominal CT with renal protocol. Will also rule out metabolic etiologies of the nephrocalcinosis. Nephrology or urology consult based on results. Short-term Norco regimen for pain control.   - CT Abdomen Pelvis w Contrast; Future  - Phosphorus  - Basic metabolic panel  - Parathyroid Hormone Intact  - Magnesium  - HYDROcodone-acetaminophen (NORCO) 5-325 MG per tablet; Take 1 tablet by mouth 2 times daily as needed  Dispense: 15 tablet; Refill: 0    3. Gastroesophageal reflux disease, esophagitis presence not specified  History of GERD, esophageal stricture, and incomplete closure of esophageal stricture as noted previous EGD per patient. Will refer to GI for EGD, recommended H. Pylori screening.     - GASTROENTEROLOGY ADULT REF PROCEDURE ONLY  - Continue Prilosec 20 mg daily morning     See Patient Instructions    JUNG [facility]    Injectable Influenza Immunization Documentation    1.  Is the person to be vaccinated sick today?   No    2. Does the person to be vaccinated have an allergy to a component   of the vaccine?   No    3. Has the  person to be vaccinated ever had a serious reaction   to influenza vaccine in the past?   No    4. Has the person to be vaccinated ever had Guillain-Barré syndrome?   No    Form completed by Fatemeh Pierson

## 2017-09-25 NOTE — PATIENT INSTRUCTIONS
CT scan to evaluate kidneys    Labs to evaluate etiology of nephrocalcinosis    Will consider nephrologist, or urologist referral       Abdominal pain possible due to renal colic     Schedule EGD    Drink more fluids    Continue Prilosec 20 mg daily morning

## 2017-09-26 ENCOUNTER — HOSPITAL ENCOUNTER (OUTPATIENT)
Dept: CT IMAGING | Facility: CLINIC | Age: 32
Discharge: HOME | End: 2017-09-26
Attending: NURSE PRACTITIONER | Admitting: NURSE PRACTITIONER
Payer: COMMERCIAL

## 2017-09-26 DIAGNOSIS — E83.59 NEPHROCALCINOSIS: ICD-10-CM

## 2017-09-26 DIAGNOSIS — N29 NEPHROCALCINOSIS: ICD-10-CM

## 2017-09-26 PROCEDURE — 74178 CT ABD&PLV WO CNTR FLWD CNTR: CPT

## 2017-09-26 PROCEDURE — 25000125 ZZHC RX 250: Performed by: RADIOLOGY

## 2017-09-26 PROCEDURE — 25000128 H RX IP 250 OP 636: Performed by: RADIOLOGY

## 2017-09-26 RX ADMIN — SODIUM CHLORIDE 80 ML: 9 INJECTION, SOLUTION INTRAVENOUS at 15:33

## 2017-09-26 RX ADMIN — IOPAMIDOL 99 ML: 755 INJECTION, SOLUTION INTRAVENOUS at 15:32

## 2017-09-27 ENCOUNTER — ANESTHESIA EVENT (OUTPATIENT)
Dept: GASTROENTEROLOGY | Facility: CLINIC | Age: 32
End: 2017-09-27
Payer: COMMERCIAL

## 2017-09-27 DIAGNOSIS — R10.31 ABDOMINAL PAIN, RIGHT LOWER QUADRANT: ICD-10-CM

## 2017-09-27 DIAGNOSIS — N20.0 NEPHROLITHIASIS: Primary | ICD-10-CM

## 2017-09-27 ASSESSMENT — LIFESTYLE VARIABLES: TOBACCO_USE: 1

## 2017-09-27 NOTE — ANESTHESIA PREPROCEDURE EVALUATION
Anesthesia Evaluation     . Pt has had prior anesthetic.            ROS/MED HX    ENT/Pulmonary:     (+)tobacco use, Past use , . .    Neurologic:     (+)migraines,     Cardiovascular:         METS/Exercise Tolerance:     Hematologic:     (+) History of Transfusion -      Musculoskeletal:         GI/Hepatic: Comment: Increased risk of aspiration due to GERD    (+) GERD Symptomatic,       Renal/Genitourinary:         Endo:         Psychiatric:     (+) psychiatric history anxiety      Infectious Disease:         Malignancy:         Other: Comment: Cervical dysplasia                    Physical Exam  Normal systems: cardiovascular, pulmonary and dental    Airway   Mallampati: II  TM distance: >3 FB  Neck ROM: full    Dental     Cardiovascular       Pulmonary                     Anesthesia Plan      History & Physical Review  History and physical reviewed and following examination; no interval change.    ASA Status:  2 .    NPO Status:  > 6 hours    Plan for MAC Reason for MAC:  Deep or markedly invasive procedure (G8)         Postoperative Care      Consents  Anesthetic plan, risks, benefits and alternatives discussed with:  Patient..                          .

## 2017-09-28 ENCOUNTER — SURGERY (OUTPATIENT)
Age: 32
End: 2017-09-28

## 2017-09-28 ENCOUNTER — HOSPITAL ENCOUNTER (OUTPATIENT)
Facility: CLINIC | Age: 32
Discharge: HOME | End: 2017-09-28
Attending: SURGERY | Admitting: SURGERY
Payer: COMMERCIAL

## 2017-09-28 ENCOUNTER — ANESTHESIA (OUTPATIENT)
Dept: GASTROENTEROLOGY | Facility: CLINIC | Age: 32
End: 2017-09-28
Payer: COMMERCIAL

## 2017-09-28 VITALS
SYSTOLIC BLOOD PRESSURE: 111 MMHG | WEIGHT: 150 LBS | HEIGHT: 64 IN | OXYGEN SATURATION: 98 % | RESPIRATION RATE: 16 BRPM | DIASTOLIC BLOOD PRESSURE: 77 MMHG | BODY MASS INDEX: 25.61 KG/M2 | TEMPERATURE: 98 F

## 2017-09-28 LAB — UPPER GI ENDOSCOPY: NORMAL

## 2017-09-28 PROCEDURE — 43235 EGD DIAGNOSTIC BRUSH WASH: CPT | Performed by: SURGERY

## 2017-09-28 PROCEDURE — 25000125 ZZHC RX 250: Performed by: SURGERY

## 2017-09-28 PROCEDURE — 25000128 H RX IP 250 OP 636: Performed by: SURGERY

## 2017-09-28 PROCEDURE — 25000132 ZZH RX MED GY IP 250 OP 250 PS 637: Performed by: SURGERY

## 2017-09-28 PROCEDURE — 37000008 ZZH ANESTHESIA TECHNICAL FEE, 1ST 30 MIN: Performed by: SURGERY

## 2017-09-28 PROCEDURE — 25000128 H RX IP 250 OP 636: Performed by: NURSE ANESTHETIST, CERTIFIED REGISTERED

## 2017-09-28 RX ORDER — PROPOFOL 10 MG/ML
INJECTION, EMULSION INTRAVENOUS CONTINUOUS PRN
Status: DISCONTINUED | OUTPATIENT
Start: 2017-09-28 | End: 2017-09-28

## 2017-09-28 RX ORDER — ONDANSETRON 2 MG/ML
4 INJECTION INTRAMUSCULAR; INTRAVENOUS
Status: DISCONTINUED | OUTPATIENT
Start: 2017-09-28 | End: 2017-09-28 | Stop reason: HOSPADM

## 2017-09-28 RX ORDER — PROPOFOL 10 MG/ML
INJECTION, EMULSION INTRAVENOUS PRN
Status: DISCONTINUED | OUTPATIENT
Start: 2017-09-28 | End: 2017-09-28

## 2017-09-28 RX ORDER — SODIUM CHLORIDE, SODIUM LACTATE, POTASSIUM CHLORIDE, CALCIUM CHLORIDE 600; 310; 30; 20 MG/100ML; MG/100ML; MG/100ML; MG/100ML
INJECTION, SOLUTION INTRAVENOUS CONTINUOUS
Status: DISCONTINUED | OUTPATIENT
Start: 2017-09-28 | End: 2017-09-28 | Stop reason: HOSPADM

## 2017-09-28 RX ORDER — LIDOCAINE 40 MG/G
CREAM TOPICAL
Status: DISCONTINUED | OUTPATIENT
Start: 2017-09-28 | End: 2017-09-28 | Stop reason: HOSPADM

## 2017-09-28 RX ORDER — SIMETHICONE 40MG/0.6ML
SUSPENSION, DROPS(FINAL DOSAGE FORM)(ML) ORAL PRN
Status: DISCONTINUED | OUTPATIENT
Start: 2017-09-28 | End: 2017-09-28 | Stop reason: HOSPADM

## 2017-09-28 RX ADMIN — PROPOFOL 50 MG: 10 INJECTION, EMULSION INTRAVENOUS at 13:29

## 2017-09-28 RX ADMIN — PROPOFOL 125 MCG/KG/MIN: 10 INJECTION, EMULSION INTRAVENOUS at 13:30

## 2017-09-28 RX ADMIN — BENZOCAINE 3 SPRAY: 220 SPRAY, METERED PERIODONTAL at 13:29

## 2017-09-28 RX ADMIN — PROPOFOL 50 MG: 10 INJECTION, EMULSION INTRAVENOUS at 13:30

## 2017-09-28 RX ADMIN — LIDOCAINE HYDROCHLORIDE 1 ML: 10 INJECTION, SOLUTION EPIDURAL; INFILTRATION; INTRACAUDAL; PERINEURAL at 13:27

## 2017-09-28 RX ADMIN — SODIUM CHLORIDE, POTASSIUM CHLORIDE, SODIUM LACTATE AND CALCIUM CHLORIDE 10 ML: 600; 310; 30; 20 INJECTION, SOLUTION INTRAVENOUS at 13:26

## 2017-09-28 RX ADMIN — SIMETHICONE 40 MG: 20 SUSPENSION/ DROPS ORAL at 13:29

## 2017-09-28 NOTE — ANESTHESIA CARE TRANSFER NOTE
Patient: Grace Dominguez    Procedure(s):  gastroscopy - Wound Class: II-Clean Contaminated    Diagnosis: gastroesophageal reflux disease, esophagitis presence not specified  Diagnosis Additional Information: No value filed.    Anesthesia Type:   MAC     Note:    Patient transferred to:Phase II        Vitals: (Last set prior to Anesthesia Care Transfer)    CRNA VITALS  9/28/2017 1307 - 9/28/2017 1337      9/28/2017             Pulse: 87    SpO2: 93 %                Electronically Signed By: Jay Sapp CRNA, JUNG RAZA  September 28, 2017  1:37 PM

## 2017-09-28 NOTE — ANESTHESIA POSTPROCEDURE EVALUATION
Patient: Grace Dominguez    Procedure(s):  gastroscopy - Wound Class: II-Clean Contaminated    Diagnosis:gastroesophageal reflux disease, esophagitis presence not specified  Diagnosis Additional Information: No value filed.    Anesthesia Type:  MAC    Note:  Anesthesia Post Evaluation    Patient location during evaluation: Bedside  Patient participation: Able to fully participate in evaluation  Level of consciousness: awake and alert  Pain management: adequate  Airway patency: patent  Cardiovascular status: acceptable  Respiratory status: acceptable  Hydration status: acceptable  PONV: none     Anesthetic complications: None          Last vitals:  Vitals:    09/28/17 1255   BP: 112/76   Resp: 18   Temp: 36.7  C (98  F)   SpO2: 100%         Electronically Signed By: Jay Sapp CRNA, JUNG RAZA  September 28, 2017  1:38 PM

## 2017-09-28 NOTE — H&P
32 year old year old female here for upper endoscopy for GERD        Patient Active Problem List   Diagnosis     Family history of congenital anomaly     Mild dysplasia of cervix     Pityriasis rosea     mirena IUD     ARDEN (generalized anxiety disorder)       Past Medical History:   Diagnosis Date     Abnormal Pap smear of cervix age 25    colposcopy normal and paps normal since     Chickenpox      History of blood transfusion 2005    After first      History of blood transfusion reaction     with her      History of colposcopy with cervical biopsy 16    visually normal, no bx taken, pt pregnant     Papanicolaou smear of cervix with low grade squamous intraepithelial lesion (LGSIL) 4/15/16    + HR HPV     PUPP (pruritic urticarial papules and plaques of pregnancy)        Past Surgical History:   Procedure Laterality Date     BIOPSY  10/17/2016    Skin biopsy due to terrible rash     C/SECTION, LOW TRANSVERSE      , Low Transverse      SECTION, TUBAL LIGATION, COMBINED N/A 10/13/2016    Procedure: COMBINED  SECTION, TUBAL LIGATION;  Surgeon: Nicole Pitts MD;  Location: [facility] OR     MOUTH SURGERY      wisdom teeth     MOUTH SURGERY      as child       Family History   Problem Relation Age of Onset     Congenital Anomalies Daughter      hydrocephalus     Hypertension Maternal Grandmother      Other Cancer Maternal Grandfather      lung     Substance Abuse Maternal Grandfather      alcohol     Other Cancer Paternal Grandmother      lung     Depression Father      suicide     Depression Mother      Substance Abuse Mother      drugs- in treatment     Substance Abuse Paternal Grandfather      alcohol       No current outpatient prescriptions on file.       No Known Allergies    Pt reports that she quit smoking about 21 months ago. She does not have any smokeless tobacco history on file. She reports that she does not drink alcohol or use illicit  drugs.    Exam:    Awake, Alert OX3  Lungs - CTA bilaterally  CV - RRR, no murmurs, distal pulses intact  Abd - soft, non-distended, non-tender, +BS  Extr - No cyanosis or edema    A/P 32 year old year old female in need of upper endoscopy for GERD. Risks, benefits, alternatives, and complications were discussed including the possibility of perforation and the patient agreed to proceed.    Milton Haines MD

## 2017-10-02 ENCOUNTER — MYC REFILL (OUTPATIENT)
Dept: FAMILY MEDICINE | Facility: CLINIC | Age: 32
End: 2017-10-02

## 2017-10-02 DIAGNOSIS — N29 NEPHROCALCINOSIS: ICD-10-CM

## 2017-10-02 DIAGNOSIS — E83.59 NEPHROCALCINOSIS: ICD-10-CM

## 2017-10-02 RX ORDER — HYDROCODONE BITARTRATE AND ACETAMINOPHEN 5; 325 MG/1; MG/1
1 TABLET ORAL 2 TIMES DAILY PRN
Qty: 15 TABLET | Refills: 0 | Status: CANCELLED | OUTPATIENT
Start: 2017-10-02

## 2017-10-02 NOTE — TELEPHONE ENCOUNTER
Message from [employer]:  Original authorizing provider: JUNG Mckeon CNP would like a refill of the following medications:  HYDROcodone-acetaminophen (NORCO) 5-325 MG per tablet [JUNG Mckeon CNP]    Preferred pharmacy: [facility], [place] - [address]    Comment:  Good morning! I have a scheduled appointment on 10/17 to look into what's going on with my kidneys and to address the pain I'm having. I do still have some of my prescription left and have been taking it sparingly, but have been relying on it some days when I get overwhelmed with the pain. I do not think that what I have left will last me until my appointment so I am wondering if a renewal would be possible to get me through to that point. Please let me know if you have questions! Thank you!

## 2017-10-04 ENCOUNTER — OFFICE VISIT (OUTPATIENT)
Dept: FAMILY MEDICINE | Facility: CLINIC | Age: 32
End: 2017-10-04
Payer: COMMERCIAL

## 2017-10-04 VITALS
SYSTOLIC BLOOD PRESSURE: 113 MMHG | HEIGHT: 64 IN | HEART RATE: 88 BPM | BODY MASS INDEX: 26.12 KG/M2 | DIASTOLIC BLOOD PRESSURE: 72 MMHG | OXYGEN SATURATION: 97 % | TEMPERATURE: 97.5 F | WEIGHT: 153 LBS

## 2017-10-04 DIAGNOSIS — E83.59 NEPHROCALCINOSIS: ICD-10-CM

## 2017-10-04 DIAGNOSIS — N29 NEPHROCALCINOSIS: ICD-10-CM

## 2017-10-04 PROCEDURE — 99213 OFFICE O/P EST LOW 20 MIN: CPT | Performed by: NURSE PRACTITIONER

## 2017-10-04 RX ORDER — HYDROCODONE BITARTRATE AND ACETAMINOPHEN 5; 325 MG/1; MG/1
1 TABLET ORAL 2 TIMES DAILY PRN
Qty: 25 TABLET | Refills: 0 | Status: SHIPPED | OUTPATIENT
Start: 2017-10-04 | End: 2017-10-17

## 2017-10-04 NOTE — NURSING NOTE
"No chief complaint on file.      Initial /72 (BP Location: Left arm, Cuff Size: Adult Regular)  Pulse 88  Temp 97.5  F (36.4  C) (Tympanic)  Ht 5' 4\" (1.626 m)  Wt 153 lb (69.4 kg)  SpO2 97%  BMI 26.26 kg/m2 Estimated body mass index is 26.26 kg/(m^2) as calculated from the following:    Height as of this encounter: 5' 4\" (1.626 m).    Weight as of this encounter: 153 lb (69.4 kg).  Medication Reconciliation: complete  "

## 2017-10-04 NOTE — PROGRESS NOTES
SUBJECTIVE:   Grace Dominguez is a 32 year old female who presents to clinic today for the following health issues: right flank pain. Recently diagnosed with right  5 mm nonobstructive kidney stone, she is scheduled to see urologist on 10/17. The patient is requesting Norco refill, she is taking 1-2 tablets daily as needed for severe flank pain, she is also taking Ibuprofen and Tylenol. Denies hematuria, nausea, vomiting, fever, chills, dysuria.     Patient knows she is early but this was the only day she had off to come in for a refill on pain medication  ABDOMINAL   PAIN, bilateral kidney stones     Onset: 2 weeks    Description:   Character: end of the day Sharp and Dull ache  Location: right flank and left flank  Radiation: Lower abdominal    Intensity: moderate, severe at the end of the day     Progression of Symptoms:  Worsening at the end of the day  and same    Accompanying Signs & Symptoms:  Fever/Chills?: no   Gas/Bloating: no   Nausea: YES  Vomitting: no   Diarrhea?: no   Constipation:no   Dysuria or Hematuria: no    History:   Trauma: no   Previous similar pain: no    Previous tests done: CT and blood work    Precipitating factors:   Does the pain change with:     Food: no      BM: no     Urination: no     Alleviating factors:  Pain medication, heat      Therapies Tried and outcome: same     LMP:  not applicable     Medication Followup of Hydrocodone     Taking Medication as prescribed: yes    Side Effects:  None    Medication Helping Symptoms:  yes     Problem list and histories reviewed & adjusted, as indicated.  Additional history: as documented    Labs reviewed in EPIC    Reviewed and updated as needed this visit by clinical staffTobacco  Allergies  Med Hx  Surg Hx  Fam Hx  Soc Hx      Reviewed and updated as needed this visit by Provider         ROS:  Constitutional, HEENT, cardiovascular, pulmonary, gi and gu systems are negative, except as otherwise noted.      OBJECTIVE:   /72 (BP  "Location: Left arm, Cuff Size: Adult Regular)  Pulse 88  Temp 97.5  F (36.4  C) (Tympanic)  Ht 5' 4\" (1.626 m)  Wt 153 lb (69.4 kg)  SpO2 97%  BMI 26.26 kg/m2  Body mass index is 26.26 kg/(m^2).  GENERAL: healthy, alert and no distress  BACK: CVA tenderness on the right.   PSYCH: mentation appears normal, affect normal/bright    Diagnostic Test Results:  none     ASSESSMENT/PLAN:     1. Nephrocalcinosis  - HYDROcodone-acetaminophen (NORCO) 5-325 MG per tablet; Take 1 tablet by mouth 2 times daily as needed  Dispense: 25 tablet; Refill: 0  -drink more fluids  -follow up with urologist     See Patient Instructions    JUNG Mckoen [facility]  "

## 2017-10-04 NOTE — MR AVS SNAPSHOT
After Visit Summary   10/4/2017    Grace Dominguez    MRN: [id number]           Patient Information     Date Of Birth          1985        Visit Information        Provider Department      10/4/2017 8:20 AM Esther Pearson APRN CNP [facility]        Today's Diagnoses     Nephrocalcinosis          Care Instructions          Thank you for choosing [facility].  You may be receiving a survey in the mail from [employer] regarding your visit today.  Please take a few minutes to complete and return the survey to let us know how we are doing.      If you have questions or concerns, please contact us via [employer] or you can contact your care team at [phone].    Our Clinic hours are:  Monday 6:40 am  to 7:00 pm  Tuesday -Friday 6:40 am to 5:00 pm    The [place] outpatient lab hours are:  Monday - Friday 6:10 am to 4:45 pm  Saturdays 7:00 am to 11:00 am  Appointments are required, call [phone]    If you have clinical questions after hours or would like to schedule an appointment,  call the clinic at [phone].    Norco 1-2 tablets daily as needed for severe back pain, abdominal pain    Drink more fluids                Follow-ups after your visit        Your next 10 appointments already scheduled     Oct 17, 2017  8:15 AM CDT   [facility] Visit with [facility]   [facility]    [address] [id number] [phone]              Who to contact     If you have questions or need follow up information about today's clinic visit or your schedule please contact [facility] directly at [phone].  Normal or non-critical lab and imaging results will be communicated to you by MyChart, letter or phone within 4 business days after the clinic has received the results. If you do not hear from us within 7 days, please contact the clinic through [employer] or phone. If you have a critical or abnormal lab  "result, we will notify you by phone as soon as possible.  Submit refill requests through [employer] or call your pharmacy and they will forward the refill request to us. Please allow 3 business days for your refill to be completed.          Additional Information About Your Visit        [employer] Information     [employer] gives you secure access to your electronic health record. If you see a primary care provider, you can also send messages to your care team and make appointments. If you have questions, please call your primary care clinic.  If you do not have a primary care provider, please call [phone] and they will assist you.        Care EveryWhere ID     This is your Care EveryWhere ID. This could be used by other organizations to access your [place] medical records  [id number]        Your Vitals Were     Pulse Temperature Height Pulse Oximetry BMI (Body Mass Index)       88 97.5  F (36.4  C) (Tympanic) 5' 4\" (1.626 m) 97% 26.26 kg/m2        Blood Pressure from Last 3 Encounters:   10/04/17 113/72   09/28/17 111/77   09/25/17 118/70    Weight from Last 3 Encounters:   10/04/17 153 lb (69.4 kg)   09/28/17 150 lb (68 kg)   09/25/17 152 lb 9.6 oz (69.2 kg)              Today, you had the following     No orders found for display         Where to get your medicines      Some of these will need a paper prescription and others can be bought over the counter.  Ask your nurse if you have questions.     Bring a paper prescription for each of these medications     HYDROcodone-acetaminophen 5-325 MG per tablet          Primary Care Provider Office Phone # Fax #    JUNG Ramos [phone] [address] [address]        Equal Access to Services     ERMA TREVIZO AH: Asim Arriaga, washaron fabian, qaybta kaalmaedmund santos, cherelle gupta. So [facility] [phone].    ATENCIÓN: Si habla español, tiene a awad disposición servicios " tho de asistencia lingüística. Isidro larry [phone].    We comply with applicable federal civil rights laws and [place] laws. We do not discriminate on the basis of race, color, national origin, age, disability, sex, sexual orientation, or gender identity.            Thank you!     Thank you for choosing [facility]  for your care. Our goal is always to provide you with excellent care. Hearing back from our patients is one way we can continue to improve our services. Please take a few minutes to complete the written survey that you may receive in the mail after your visit with us. Thank you!             Your Updated Medication List - Protect others around you: Learn how to safely use, store and throw away your medicines at www.disposemymeds.org.          This list is accurate as of: 10/4/17  8:42 AM.  Always use your most recent med list.                   Brand Name Dispense Instructions for use Diagnosis    HYDROcodone-acetaminophen 5-325 MG per tablet    NORCO    25 tablet    Take 1 tablet by mouth 2 times daily as needed    Nephrocalcinosis       hydrocortisone 2.5 % cream    ANUSOL-HC    30 g    Place rectally 2 times daily    External hemorrhoids       ondansetron 4 MG ODT tab    ZOFRAN ODT    7 tablet    Take 1 tablet (4 mg) by mouth every 8 hours as needed        PRILOSEC PO      Take 10 mg by mouth daily        sertraline 50 MG tablet    ZOLOFT    30 tablet    Take 1/2 tablet (25 mg) for 1-2 weeks, then increase to 1 tablet orally daily    ARDEN (generalized anxiety disorder)

## 2017-10-17 ENCOUNTER — OFFICE VISIT (OUTPATIENT)
Dept: FAMILY MEDICINE | Facility: CLINIC | Age: 32
End: 2017-10-17
Payer: COMMERCIAL

## 2017-10-17 ENCOUNTER — OFFICE VISIT (OUTPATIENT)
Dept: UROLOGY | Facility: CLINIC | Age: 32
End: 2017-10-17
Payer: COMMERCIAL

## 2017-10-17 VITALS
WEIGHT: 153.5 LBS | DIASTOLIC BLOOD PRESSURE: 78 MMHG | SYSTOLIC BLOOD PRESSURE: 110 MMHG | HEIGHT: 64 IN | TEMPERATURE: 97.9 F | HEART RATE: 75 BPM | BODY MASS INDEX: 26.21 KG/M2

## 2017-10-17 VITALS
RESPIRATION RATE: 14 BRPM | HEART RATE: 92 BPM | SYSTOLIC BLOOD PRESSURE: 113 MMHG | HEIGHT: 64 IN | BODY MASS INDEX: 26.12 KG/M2 | WEIGHT: 153 LBS | DIASTOLIC BLOOD PRESSURE: 78 MMHG

## 2017-10-17 DIAGNOSIS — N20.0 CALCULUS OF KIDNEY: Primary | ICD-10-CM

## 2017-10-17 DIAGNOSIS — M54.6 BILATERAL THORACIC BACK PAIN, UNSPECIFIED CHRONICITY: Primary | ICD-10-CM

## 2017-10-17 LAB
ALBUMIN UR-MCNC: NEGATIVE MG/DL
APPEARANCE UR: CLEAR
BACTERIA #/AREA URNS HPF: ABNORMAL /HPF
BILIRUB UR QL STRIP: NEGATIVE
COLOR UR AUTO: YELLOW
GLUCOSE UR STRIP-MCNC: NEGATIVE MG/DL
HGB UR QL STRIP: ABNORMAL
KETONES UR STRIP-MCNC: NEGATIVE MG/DL
LEUKOCYTE ESTERASE UR QL STRIP: NEGATIVE
NITRATE UR QL: NEGATIVE
NON-SQ EPI CELLS #/AREA URNS LPF: ABNORMAL /LPF
PH UR STRIP: 6 PH (ref 5–7)
RBC #/AREA URNS AUTO: ABNORMAL /HPF
SOURCE: ABNORMAL
SP GR UR STRIP: <=1.005 (ref 1–1.03)
UROBILINOGEN UR STRIP-ACNC: 0.2 EU/DL (ref 0.2–1)
WBC #/AREA URNS AUTO: ABNORMAL /HPF

## 2017-10-17 PROCEDURE — 51798 US URINE CAPACITY MEASURE: CPT | Performed by: UROLOGY

## 2017-10-17 PROCEDURE — 99213 OFFICE O/P EST LOW 20 MIN: CPT | Performed by: INTERNAL MEDICINE

## 2017-10-17 PROCEDURE — 87086 URINE CULTURE/COLONY COUNT: CPT | Performed by: UROLOGY

## 2017-10-17 PROCEDURE — 99202 OFFICE O/P NEW SF 15 MIN: CPT | Mod: 25 | Performed by: UROLOGY

## 2017-10-17 PROCEDURE — 81001 URINALYSIS AUTO W/SCOPE: CPT | Performed by: UROLOGY

## 2017-10-17 RX ORDER — CYCLOBENZAPRINE HCL 10 MG
5-10 TABLET ORAL 3 TIMES DAILY PRN
Qty: 60 TABLET | Refills: 3 | Status: SHIPPED | OUTPATIENT
Start: 2017-10-17 | End: 2018-10-03

## 2017-10-17 ASSESSMENT — PAIN SCALES - GENERAL: PAINLEVEL: SEVERE PAIN (7)

## 2017-10-17 NOTE — MR AVS SNAPSHOT
After Visit Summary   10/17/2017    Grace Dominguez    MRN: [id number]           Patient Information     Date Of Birth          1985        Visit Information        Provider Department      10/17/2017 1:20 PM Kenny Nelson MD [facility]        Today's Diagnoses     Bilateral thoracic back pain, unspecified chronicity    -  1      Care Instructions    The pain seems to be originating from the muscles in the back, so we'll try a muscle relaxer (Flexeril) and an antiinflammatory medication (diclofenac).  If things are not improving after a couple of weeks, please let me know.            Follow-ups after your visit        Who to contact     If you have questions or need follow up information about today's clinic visit or your schedule please contact [facility] directly at [phone].  Normal or non-critical lab and imaging results will be communicated to you by MyChart, letter or phone within 4 business days after the clinic has received the results. If you do not hear from us within 7 days, please contact the clinic through MyChart or phone. If you have a critical or abnormal lab result, we will notify you by phone as soon as possible.  Submit refill requests through [employer] or call your pharmacy and they will forward the refill request to us. Please allow 3 business days for your refill to be completed.          Additional Information About Your Visit        MyChart Information     [employer] gives you secure access to your electronic health record. If you see a primary care provider, you can also send messages to your care team and make appointments. If you have questions, please call your primary care clinic.  If you do not have a primary care provider, please call [phone] and they will assist you.        Care EveryWhere ID     This is your Care EveryWhere ID. This could be used by other organizations to access your [place] medical records  [id number]        Your  "Vitals Were     Pulse Temperature Height BMI (Body Mass Index)          75 97.9  F (36.6  C) (Tympanic) 5' 4\" (1.626 m) 26.35 kg/m2         Blood Pressure from Last 3 Encounters:   10/17/17 110/78   10/17/17 113/78   10/04/17 113/72    Weight from Last 3 Encounters:   10/17/17 153 lb 8 oz (69.6 kg)   10/17/17 153 lb (69.4 kg)   10/04/17 153 lb (69.4 kg)              Today, you had the following     No orders found for display         Today's Medication Changes          These changes are accurate as of: 10/17/17  2:03 PM.  If you have any questions, ask your nurse or doctor.               Start taking these medicines.        Dose/Directions    cyclobenzaprine 10 MG tablet   Commonly known as:  FLEXERIL   Used for:  Bilateral thoracic back pain, unspecified chronicity   Started by:  Kenny Nelson MD        Dose:  5-10 mg   Take 0.5-1 tablets (5-10 mg) by mouth 3 times daily as needed for muscle spasms   Quantity:  60 tablet   Refills:  3       diclofenac 50 MG EC tablet   Commonly known as:  VOLTAREN   Used for:  Bilateral thoracic back pain, unspecified chronicity   Started by:  Kenny Nelson MD        Dose:  50 mg   Take 1 tablet (50 mg) by mouth 3 times daily as needed for moderate pain   Quantity:  60 tablet   Refills:  3         Stop taking these medicines if you haven't already. Please contact your care team if you have questions.     HYDROcodone-acetaminophen 5-325 MG per tablet   Commonly known as:  NORCO   Stopped by:  Kenny Nelson MD           hydrocortisone 2.5 % cream   Commonly known as:  ANUSOL-HC   Stopped by:  ADELINA Thomas MD           sertraline 50 MG tablet   Commonly known as:  ZOLOFT   Stopped by:  ADELINA Thomas MD                Where to get your medicines      These medications were sent to [facility] [place], [place] - [address]  [address] [place] [address]     Phone:  [phone]     cyclobenzaprine 10 MG tablet    diclofenac 50 MG EC tablet             "    Primary Care Provider Office Phone # Fax #    Esther Pearson, JUNG [phone] [address] [address]        Equal Access to Services     LILIANA TREVIZO : Hadii aad ku hadclementineo Soomaali, waaxda luqadaha, qaybta kaalmada adeegyada, waxcaleb currann kemarruben herrera megha gupta. So [facility] [place] [phone].    ATENCIÓN: Si habla español, tiene a awad disposición servicios gratuitos de asistencia lingüística. Llame al [phone].    We comply with applicable federal civil rights laws and [place] laws. We do not discriminate on the basis of race, color, national origin, age, disability, sex, sexual orientation, or gender identity.            Thank you!     Thank you for choosing [facility]  for your care. Our goal is always to provide you with excellent care. Hearing back from our patients is one way we can continue to improve our services. Please take a few minutes to complete the written survey that you may receive in the mail after your visit with us. Thank you!             Your Updated Medication List - Protect others around you: Learn how to safely use, store and throw away your medicines at www.disposemymeds.org.          This list is accurate as of: 10/17/17  2:03 PM.  Always use your most recent med list.                   Brand Name Dispense Instructions for use Diagnosis    cyclobenzaprine 10 MG tablet    FLEXERIL    60 tablet    Take 0.5-1 tablets (5-10 mg) by mouth 3 times daily as needed for muscle spasms    Bilateral thoracic back pain, unspecified chronicity       diclofenac 50 MG EC tablet    VOLTAREN    60 tablet    Take 1 tablet (50 mg) by mouth 3 times daily as needed for moderate pain    Bilateral thoracic back pain, unspecified chronicity       ondansetron 4 MG ODT tab    ZOFRAN ODT    7 tablet    Take 1 tablet (4 mg) by mouth every 8 hours as needed        PRILOSEC PO      Take 10 mg by mouth daily

## 2017-10-17 NOTE — PROGRESS NOTES
Appointment source: New Patient  Patient name: Grace Dominguez  Urology Staff: Orville Thomas MD    Subjective: This is a 32 year old year old female complaining of recurrent bilateral flank and back pain.    Objective:  Describes back pain that worsens during the day and appears to be related to physical activity.    Occasionally awakened by the pain but usually does not have back pain first thing in the morning.    Has bilateral non obstructing kidney stones. No evidence of ureteral stones.    No past history of ureteral stones or family history of lithiasis.    Did ave pyelonephritis while pregnant about 5 years ago.    Examination:    Healthy appearing female in no distress.    Mild anterior abdominal discomfort to palpation and mild costovertebral angle tenderness.    No suggestion of renal colic.    Assessment:  Probable metabolically active stone disease and musculoskeletal back pain.    Plan:  Will initiate metabolic evaluation for non obstructing stones and suggested she return to her primary care provider for further evaluation of her back pain.    Total time 20 minutes, counseling 15 minutes discussing renal stones and back pain.

## 2017-10-17 NOTE — NURSING NOTE
Active order to obtain bladder scan? Yes   Name of ordering provider:  Dr Thomas  Bladder scan preformed post void Yes  Bladder scan reveled 101ML  Provider notified?  Yes    Amy Morrison MA

## 2017-10-17 NOTE — MR AVS SNAPSHOT
After Visit Summary   10/17/2017    Grace Dominguez    MRN: [id number]           Patient Information     Date Of Birth          1985        Visit Information        Provider Department      10/17/2017 8:15 AM ADELINA Thomas MD [facility]        Today's Diagnoses     Calculus of kidney    -  1      Care Instructions    Per Physician's instructions            Follow-ups after your visit        Your next 10 appointments already scheduled     Oct 17, 2017  1:20 PM CDT   SHORT with Kenny Nelson MD   [facility] ([facility]    [address] [place] [address]   [phone]              Who to contact     If you have questions or need follow up information about today's clinic visit or your schedule please contact [facility] directly at [phone].  Normal or non-critical lab and imaging results will be communicated to you by MyChart, letter or phone within 4 business days after the clinic has received the results. If you do not hear from us within 7 days, please contact the clinic through MyChart or phone. If you have a critical or abnormal lab result, we will notify you by phone as soon as possible.  Submit refill requests through Ep[employer] or call your pharmacy and they will forward the refill request to us. Please allow 3 business days for your refill to be completed.          Additional Information About Your Visit        MyChart Information     Ep[employer] gives you secure access to your electronic health record. If you see a primary care provider, you can also send messages to your care team and make appointments. If you have questions, please call your primary care clinic.  If you do not have a primary care provider, please call [phone] and they will assist you.        Care EveryWhere ID     This is your Care EveryWhere ID. This could be used by other organizations to access your [place] medical records  [id number]       "  Your Vitals Were     Pulse Respirations Height BMI (Body Mass Index)          92 14 1.626 m (5' 4\") 26.26 kg/m2         Blood Pressure from Last 3 Encounters:   10/17/17 113/78   10/04/17 113/72   09/28/17 111/77    Weight from Last 3 Encounters:   10/17/17 69.4 kg (153 lb)   10/04/17 69.4 kg (153 lb)   09/28/17 68 kg (150 lb)              We Performed the Following     MEASURE POST-VOID RESIDUAL URINE/BLADDER CAPACITY, US NON-IMAGING     UA with Microscopic     Urine Culture Aerobic Bacterial          Today's Medication Changes          These changes are accurate as of: 10/17/17  1:16 PM.  If you have any questions, ask your nurse or doctor.               Stop taking these medicines if you haven't already. Please contact your care team if you have questions.     hydrocortisone 2.5 % cream   Commonly known as:  ANUSOL-HC   Stopped by:  ADELINA Thomas MD           sertraline 50 MG tablet   Commonly known as:  ZOLOFT   Stopped by:  ADELINA Thomas MD                    Primary Care Provider Office Phone # Fax #    Esther Pearson, APRN [phone] [address] [address]        Equal Access to Services     LILIANA TREVIZO AH: Hadii suraj ku hadasho Soomaali, waaxda luqadaha, qaybta kaalmada adeegyada, waxay idiin hayfuentesn mark gupta. So [facility] [phone].    ATENCIÓN: Si habla español, tiene a awad disposición servicios gratuitos de asistencia lingüística. Llame al [phone].    We comply with applicable federal civil rights laws and [place] laws. We do not discriminate on the basis of race, color, national origin, age, disability, sex, sexual orientation, or gender identity.            Thank you!     Thank you for choosing [facility]  for your care. Our goal is always to provide you with excellent care. Hearing back from our patients is one way we can continue to improve our services. Please take a few minutes to complete the written survey that you " may receive in the mail after your visit with us. Thank you!             Your Updated Medication List - Protect others around you: Learn how to safely use, store and throw away your medicines at www.disposemymeds.org.          This list is accurate as of: 10/17/17  1:16 PM.  Always use your most recent med list.                   Brand Name Dispense Instructions for use Diagnosis    HYDROcodone-acetaminophen 5-325 MG per tablet    NORCO    25 tablet    Take 1 tablet by mouth 2 times daily as needed    Nephrocalcinosis       ondansetron 4 MG ODT tab    ZOFRAN ODT    7 tablet    Take 1 tablet (4 mg) by mouth every 8 hours as needed        PRILOSEC PO      Take 10 mg by mouth daily

## 2017-10-17 NOTE — PATIENT INSTRUCTIONS
The pain seems to be originating from the muscles in the back, so we'll try a muscle relaxer (Flexeril) and an antiinflammatory medication (diclofenac).  If things are not improving after a couple of weeks, please let me know.

## 2017-10-17 NOTE — NURSING NOTE
"Chief Complaint   Patient presents with     Recheck Medication     Norco, has stopped taking it, states when she takes it she has itching from her waste down       Initial /78  Pulse 75  Temp 97.9  F (36.6  C) (Tympanic)  Ht 5' 4\" (1.626 m)  Wt 153 lb 8 oz (69.6 kg)  BMI 26.35 kg/m2 Estimated body mass index is 26.35 kg/(m^2) as calculated from the following:    Height as of this encounter: 5' 4\" (1.626 m).    Weight as of this encounter: 153 lb 8 oz (69.6 kg).  Medication Reconciliation: complete  "

## 2017-10-17 NOTE — NURSING NOTE
"Chief Complaint   Patient presents with     Consult For     nephrolithiasis, RLQ pain       Initial /78 (BP Location: Right arm, Patient Position: Chair, Cuff Size: Adult Regular)  Pulse 92  Resp 14  Ht 1.626 m (5' 4\")  Wt 69.4 kg (153 lb)  BMI 26.26 kg/m2 Estimated body mass index is 26.26 kg/(m^2) as calculated from the following:    Height as of this encounter: 1.626 m (5' 4\").    Weight as of this encounter: 69.4 kg (153 lb).  Medication Reconciliation: complete     Amy Morrison MA      "

## 2017-10-18 LAB
BACTERIA SPEC CULT: NORMAL
Lab: NORMAL
SPECIMEN SOURCE: NORMAL

## 2017-11-06 ENCOUNTER — TRANSFERRED RECORDS (OUTPATIENT)
Dept: HEALTH INFORMATION MANAGEMENT | Facility: CLINIC | Age: 32
End: 2017-11-06

## 2017-12-06 ENCOUNTER — PRE VISIT (OUTPATIENT)
Dept: UROLOGY | Facility: CLINIC | Age: 32
End: 2017-12-06

## 2017-12-19 ENCOUNTER — OFFICE VISIT (OUTPATIENT)
Dept: UROLOGY | Facility: CLINIC | Age: 32
End: 2017-12-19
Payer: COMMERCIAL

## 2017-12-19 VITALS
BODY MASS INDEX: 26.31 KG/M2 | WEIGHT: 153.3 LBS | HEART RATE: 76 BPM | DIASTOLIC BLOOD PRESSURE: 80 MMHG | SYSTOLIC BLOOD PRESSURE: 124 MMHG

## 2017-12-19 DIAGNOSIS — N20.0 CALCULUS OF KIDNEY: Primary | ICD-10-CM

## 2017-12-19 LAB
ALBUMIN UR-MCNC: NEGATIVE MG/DL
APPEARANCE UR: CLEAR
BACTERIA #/AREA URNS HPF: ABNORMAL /HPF
BILIRUB UR QL STRIP: NEGATIVE
COLOR UR AUTO: ABNORMAL
GLUCOSE UR STRIP-MCNC: NEGATIVE MG/DL
HGB UR QL STRIP: ABNORMAL
KETONES UR STRIP-MCNC: NEGATIVE MG/DL
LEUKOCYTE ESTERASE UR QL STRIP: NEGATIVE
NITRATE UR QL: NEGATIVE
PH UR STRIP: 6 PH (ref 5–7)
RBC #/AREA URNS AUTO: 1 /HPF (ref 0–2)
SOURCE: ABNORMAL
SP GR UR STRIP: 1.01 (ref 1–1.03)
SQUAMOUS #/AREA URNS AUTO: <1 /HPF (ref 0–1)
UROBILINOGEN UR STRIP-MCNC: 0 MG/DL (ref 0–2)
WBC #/AREA URNS AUTO: 1 /HPF (ref 0–2)

## 2017-12-19 RX ORDER — HYDROCODONE BITARTRATE AND ACETAMINOPHEN 5; 325 MG/1; MG/1
TABLET ORAL
COMMUNITY
Start: 2017-10-04 | End: 2018-01-17

## 2017-12-19 ASSESSMENT — PAIN SCALES - GENERAL: PAINLEVEL: NO PAIN (0)

## 2017-12-19 NOTE — MR AVS SNAPSHOT
After Visit Summary   12/19/2017    Grace Dominguez    MRN: [id number]           Patient Information     Date Of Birth          1985        Visit Information        Provider Department      12/19/2017 11:00 AM Kaiden Jefferson MD [facility]        Today's Diagnoses     Calculus of kidney    -  1       Follow-ups after your visit        Your next 10 appointments already scheduled     Feb 06, 2018 11:00 AM CST   (Arrive by 10:45 AM)   Return Renal Calculi with Kaiden Jefferson MD   [facility] ([facility])    [address]  [address] [facility] [address] [phone]              Future tests that were ordered for you today     Open Future Orders        Priority Expected Expires Ordered    Urine Culture Aerobic Bacterial Routine  12/19/2018 12/19/2017    Routine UA with micro reflex to culture Routine  12/19/2018 12/19/2017            Who to contact     Please call your clinic at [phone] to:    Ask questions about your health    Make or cancel appointments    Discuss your medicines    Learn about your test results    Speak to your doctor   If you have compliments or concerns about an experience at your clinic, or if you wish to file a complaint, please contact [facility] Patient Relations at [phone] or email us at [email or web address]         Additional Information About Your Visit        MyChart Information     [employer] gives you secure access to your electronic health record. If you see a primary care provider, you can also send messages to your care team and make appointments. If you have questions, please call your primary care clinic.  If you do not have a primary care provider, please call [phone] and they will assist you.      [employer] is an electronic gateway that provides easy, online access to your  medical records. With [employer], you can request a clinic appointment, read your test results, renew a prescription or communicate with your care team.     To access your existing account, please contact your [facility] or call [phone] for assistance.        Care EveryWhere ID     This is your Care EveryWhere ID. This could be used by other organizations to access your [place] medical records  [id number]        Your Vitals Were     Pulse BMI (Body Mass Index)                76 26.31 kg/m2           Blood Pressure from Last 3 Encounters:   12/19/17 124/80   10/17/17 110/78   10/17/17 113/78    Weight from Last 3 Encounters:   12/19/17 69.5 kg (153 lb 4.8 oz)   10/17/17 69.6 kg (153 lb 8 oz)   10/17/17 69.4 kg (153 lb)              We Performed the Following     [employer]: Clinic Lab Order        Primary Care Provider Office Phone # Fax #    Esther Jones Lili, APRN [phone] [address] [address]        Equal Access to Services     ERMA TREVIZO : Hadii aad ku hadasho Soomaali, waaxda luqadaha, qaybta kaalmada adeegyaedmund, cherelle [place] . So [facility] [phone].    ATENCIÓN: Si habla español, tiene a awad disposición servicios gratjohnyos de asistencia lingüística. Isidro al [phone].    We comply with applicable federal civil rights laws and [place] laws. We do not discriminate on the basis of race, color, national origin, age, disability, sex, sexual orientation, or gender identity.            Thank you!     Thank you for choosing [facility]  for your care. Our goal is always to provide you with excellent care. Hearing back from our patients is one way we can continue to improve our services. Please take a few minutes to complete the written survey that you may receive in the mail after your visit with us. Thank you!             Your Updated Medication List -  Protect others around you: Learn how to safely use, store and throw away your medicines at www.disposemymeds.org.          This list is accurate as of: 12/19/17 11:27 AM.  Always use your most recent med list.                   Brand Name Dispense Instructions for use Diagnosis    cyclobenzaprine 10 MG tablet    FLEXERIL    60 tablet    Take 0.5-1 tablets (5-10 mg) by mouth 3 times daily as needed for muscle spasms    Bilateral thoracic back pain, unspecified chronicity       diclofenac 50 MG EC tablet    VOLTAREN    60 tablet    Take 1 tablet (50 mg) by mouth 3 times daily as needed for moderate pain    Bilateral thoracic back pain, unspecified chronicity       HYDROcodone-acetaminophen 5-325 MG per tablet    NORCO          ondansetron 4 MG ODT tab    ZOFRAN ODT    7 tablet    Take 1 tablet (4 mg) by mouth every 8 hours as needed        PRILOSEC PO      Take 10 mg by mouth daily

## 2017-12-19 NOTE — NURSING NOTE
Chief Complaint   Patient presents with     RECHECK     Stone patient referred from Dr. Thomas.     [facility]

## 2017-12-19 NOTE — PROGRESS NOTES
Chief Complaint:   Bilateral Kidney Stones         Consult or Referral:   Grace Dominguez is a 32 year old female seen in consultation from Dr. Thomas.         History of Present Illness:    Grace Dominguez is a very pleasant 32 year old female who presents with a history of bilateral non obstructing kidney stones.     In September, she presented to the ED with right flank pain. CT demonstrated bilateral kidney stones.     Today she reports right flank pain, dull, chronic, waxes and wanes. She does not recall ever passing stone.  She has tried physical therapy exercises. She notes no improvement in her pain. She denies any history of stones prior to this episode. Denies any history of hematuria, recurrent UTI.    First stone: 2017  Number of stone surgeries: None  Number of stones passed spontaneously: None  History of UTI: Bladder infection once, kidney infection once 12 years ago  Prior Stone Analysis: None  Family History Nephrolithasis: None  Stone Risk Factors: None  Prior Metabolic Workup: 24hr urine analysis unremarkable other than markedly low urine output         Past Medical History:     Past Medical History:   Diagnosis Date     Abnormal Pap smear of cervix age 25    colposcopy normal and paps normal since     Chickenpox      History of blood transfusion 2005    After first      History of blood transfusion reaction     with her      History of colposcopy with cervical biopsy 16    visually normal, no bx taken, pt pregnant     Papanicolaou smear of cervix with low grade squamous intraepithelial lesion (LGSIL) 4/15/16    + HR HPV     PUPP (pruritic urticarial papules and plaques of pregnancy)             Past Surgical History:     Past Surgical History:   Procedure Laterality Date     BIOPSY  10/17/2016    Skin biopsy due to terrible rash     C/SECTION, LOW TRANSVERSE      , Low Transverse      SECTION, TUBAL LIGATION, COMBINED N/A 10/13/2016    Procedure:  COMBINED  SECTION, TUBAL LIGATION;  Surgeon: Nicole Pitts MD;  Location: [facility] OR     ESOPHAGOSCOPY, GASTROSCOPY, DUODENOSCOPY (EGD), COMBINED N/A 2017    Procedure: COMBINED ESOPHAGOSCOPY, GASTROSCOPY, DUODENOSCOPY (EGD);  gastroscopy;  Surgeon: Milton Haines MD;  Location: [facility]     MOUTH SURGERY      wisdom teeth     MOUTH SURGERY      as child            Medications     Current Outpatient Prescriptions   Medication     HYDROcodone-acetaminophen (NORCO) 5-325 MG per tablet     cyclobenzaprine (FLEXERIL) 10 MG tablet     diclofenac (VOLTAREN) 50 MG EC tablet     Omeprazole (PRILOSEC PO)     ondansetron (ZOFRAN ODT) 4 MG ODT tab     No current facility-administered medications for this visit.             Family History:     Family History   Problem Relation Age of Onset     Congenital Anomalies Daughter      hydrocephalus     Hypertension Maternal Grandmother      Other Cancer Maternal Grandfather      lung     Substance Abuse Maternal Grandfather      alcohol     Other Cancer Paternal Grandmother      lung     Depression Father      suicide     Depression Mother      Substance Abuse Mother      drugs- in treatment     Substance Abuse Paternal Grandfather      alcohol            Social History:     Social History     Social History     Marital status:      Spouse name: N/A     Number of children: N/A     Years of education: N/A     Occupational History     Not on file.     Social History Main Topics     Smoking status: Former Smoker     Quit date: 2015     Smokeless tobacco: Never Used     Alcohol use No      Comment: 2 drinks weekly- quit with pregnancy     Drug use: No     Sexual activity: Yes     Partners: Male     Birth control/ protection: IUD, Female Surgical     Other Topics Concern     Parent/Sibling W/ Cabg, Mi Or Angioplasty Before 65f 55m? No     Social History Narrative            Allergies:   Review of patient's allergies indicates no known allergies.          Review of Systems:  From intake questionnaire   Negative 14 system review except as noted on HPI, nurse's note.         Physical Exam:   Patient is a 32 year old  female   Vitals: Blood pressure 124/80, pulse 76, weight 69.5 kg (153 lb 4.8 oz), currently breastfeeding.  General Appearance Adult: Alert, no acute distress, oriented  HENT: throat/mouth:normal, good dentition  Neck: No adenopathy,masses or thyromegaly  Lungs: no respiratory distress, or pursed lip breathing  Heart: No obvious jugular venous distension present  Abdomen: soft, nontender, no organomegaly or masses, no cva tenderness bilaterally Body mass index is 26.31 kg/(m^2).  Lymphatics: No cervical or supraclavicular adenopathy  Musculoskeltal: extremities normal, no peripheral edema  Skin: no suspicious lesions or rashes  Neuro: Alert, oriented, speech and mentation normal  Psych: affect and mood normal  Gait: Normal  : Deferred       Labs and Pathology:    I personally reviewed all applicable laboratory data and went over findings with patient  Significant for:    CBC RESULTS:  Recent Labs   Lab Test  09/22/17   1900  08/04/17   0720  11/28/16   1126  10/15/16   1555  10/15/16   0653  10/13/16   1140  10/11/16   1149   WBC  8.2  4.9  8.0   --    --   7.5  8.0   HGB  13.9   --   12.9  6.8*  6.5*  10.3*  11.0*   PLT  236   --   284   --    --   177  199        BMP RESULTS:  Recent Labs   Lab Test  09/25/17   0935  09/22/17   1900  11/28/16   1126  10/11/16   1149   10/02/16   2313   NA  141  140  139   --    --   140   POTASSIUM  3.9  3.4  3.5   --    --   3.4   CHLORIDE  108  107  104   --    --   109   CO2  26  24  26   --    --   21   ANIONGAP  7  9  9   --    --   10   GLC  84  85  83   --    --   99   BUN  11  12  14   --    --   6*   CR  0.76  0.68  0.74  0.65   < >  0.54   GFRESTIMATED  89  >90  >90  Non African American GFR Calc    >90  Non  GFR Calc     < >  >90  Non  GFR Calc     GFRESTBLACK  >90  >90   >90   GFR Calc    >90   GFR Calc     < >  >90   GFR Calc     MEG  9.5  9.3  9.2   --    --   8.7    < > = values in this interval not displayed.       UA RESULTS:   Recent Labs   Lab Test  10/17/17   0815  09/22/17   1840  10/04/16   2300   04/15/16   1108   SG  <=1.005  1.000*  1.018   < >  <=1.005   URINEPH  6.0  6.5  7.0   < >  7.0   NITRITE  Negative  Negative  Negative   < >  Negative   RBCU  O - 2   --   1   --   O - 2   WBCU  O - 2   --   9*   --   O - 2    < > = values in this interval not displayed.       CALCIUM RESULTS  Lab Results   Component Value Date    MEG 9.5 09/25/2017    MEG 9.3 09/22/2017    MEG 9.2 11/28/2016           Imaging:    I personally reviewed all applicable imaging and went over findings with patient.  Significant for:    Results for orders placed or performed during the hospital encounter of 09/26/17   CT Abdomen Pelvis w/o & w Contrast    Narrative    CT ABDOMEN AND PELVIS WITHOUT AND WITH CONTRAST 9/26/2017 3:51 PM    HISTORY: Right lower abdominal pain. Evaluate for renal stones.  Evaluate for nephrocalcinosis.    TECHNIQUE: Helical axial scans from dome of liver through pubic  symphysis without and with 99 mL Isovue-370 IV contrast. Radiation  dose for this scan was reduced using automated exposure control,  adjustment of the mA and/or kV according to patient size, or iterative  reconstruction technique.    COMPARISON: Contrast enhanced exam 6/22/2017.    FINDINGS: There is a 5 mm nonobstructing calculus in the lower pole  collecting system of the right kidney and a 2 mm nonobstructing  calculus in the upper pole collecting system of the left kidney, both  unchanged. No evidence for urinary tract obstruction bilaterally.    Renal parenchymal enhancement is normal with no parenchymal mass  lesions. Renal collecting systems, pelves and visualized ureters are  unremarkable.    The liver, spleen, pancreas and bilateral adrenal glands  appear normal  and unchanged. The bowel mesentery in the upper abdomen are  unremarkable.    Scans through the pelvis show a small amount of free fluid in the  cul-de-sac which was present but slightly less on the prior exam.  Intrauterine device is seen in the expected location in the  endometrial canal. The appendix is identified and appears normal.      Impression    IMPRESSION:  1. Small nonobstructing calculi in the lower pole collecting system of  the right kidney and upper pole collecting system of the left kidney.  No urinary tract obstruction. No other urinary tract abnormal  calcifications.  2. No acute appearing abnormality in the abdomen or pelvis and no  change since the prior exam.    MARGO KANG MD              Assessment and Plan:     Assessment:  33 yo F w bilateral nonobstructing kidney stones    Plan:  -We reviewed general recommendations to prevent kidney stones including the followin) Low oxalate diet. We discussed foods that are particularly high in oxalate including spinach, rhubarb, beets, nuts, nut butters, and black teas.     2) Low salt diet. Discussed common foods with high amounts of salt as well as dietary strategies to minimize sodium.     3) High fluid intake. Recommend 3 liters of fluid daily to produce goal of 2.5L of urine.     4) Modest animal protein. Recommend limiting animal protein to one serving or less daily.     5) Normal dietary calcium.    We discussed the nature of nonobstructing renal stones and pain.  We reviewed the fact that widely held opinion in the field of urology is that nonobstructing stones should not cause pain; however, several publications have demonstrated improvement with stone removal in such circumstances.  We reviewed the risks of stone removal including but not limited to bleeding, infection, damage to urinary and adjacent structures, stricture and incomplete stone removal.  We discussed the potential need for staged procedures and went  over the potential need for ureteral stent as well as associated symptoms.  Finally, we discussed that stone removal for pain in the absence of obstruction is not a guarantee to improve pain and that the potential for pain improvement is likely around 50%.      Ultimately the patient has elected to proceed with observation at this time. Will follow up in 6-8 weeks with 24hr urine analysis and symptom check.   Will also enroll in smart water bottle research study as a mechanism to help improve urine output.    Orders  No orders of the defined types were placed in this encounter.      Scribe Disclosure:   Dominick SHARIF, am serving as a scribe; to document services personally performed by Kaiden Jefferson MD based on data collection and the provider's statements to me.     Kaiden SHARIF saw and evaluated this patient and agree with the plan as stated above     CC:  CC  Patient Care Team:  Esther Pearson APRN CNP as PCP - General (Nurse Practitioner - Gerontology)  ADELINA ABBASI

## 2017-12-19 NOTE — LETTER
2017       RE: Grace Dominguez  [address] [place] [address]     Dear Colleague,    Thank you for referring your patient, Grace Dominguez, to the [facility] UROLOGY AND INST FOR PROSTATE AND UROLOGIC CANCERS at [facility]. Please see a copy of my visit note below.          Chief Complaint:   Bilateral Kidney Stones         Consult or Referral:   Grace Dominguez is a 32 year old female seen in consultation from Dr. Thomas.         History of Present Illness:    Grace Dominguez is a very pleasant 32 year old female who presents with a history of bilateral non obstructing kidney stones.     In September, she presented to the ED with right flank pain. CT demonstrated bilateral kidney stones.     Today she reports right flank pain, dull, chronic, waxes and wanes. She does not recall ever passing stone.  She has tried physical therapy exercises. She notes no improvement in her pain. She denies any history of stones prior to this episode. Denies any history of hematuria, recurrent UTI.    First stone: 2017  Number of stone surgeries: None  Number of stones passed spontaneously: None  History of UTI: Bladder infection once, kidney infection once 12 years ago  Prior Stone Analysis: None  Family History Nephrolithasis: None  Stone Risk Factors: None  Prior Metabolic Workup: 24hr urine analysis unremarkable other than markedly low urine output         Past Medical History:     Past Medical History:   Diagnosis Date     Abnormal Pap smear of cervix age 25    colposcopy normal and paps normal since     Chickenpox      History of blood transfusion 2005    After first      History of blood transfusion reaction     with her      History of colposcopy with cervical biopsy 16    visually normal, no bx taken, pt pregnant     Papanicolaou smear of cervix with low grade squamous intraepithelial lesion (LGSIL) 4/15/16    + HR HPV     PUPP (pruritic urticarial  papules and plaques of pregnancy)             Past Surgical History:     Past Surgical History:   Procedure Laterality Date     BIOPSY  10/17/2016    Skin biopsy due to terrible rash     C/SECTION, LOW TRANSVERSE      , Low Transverse      SECTION, TUBAL LIGATION, COMBINED N/A 10/13/2016    Procedure: COMBINED  SECTION, TUBAL LIGATION;  Surgeon: Nicole Pitts MD;  Location: [facility]     ESOPHAGOSCOPY, GASTROSCOPY, DUODENOSCOPY (EGD), COMBINED N/A 2017    Procedure: COMBINED ESOPHAGOSCOPY, GASTROSCOPY, DUODENOSCOPY (EGD);  gastroscopy;  Surgeon: Milton Haines MD;  Location: [facility]     MOUTH SURGERY      wisdom teeth     MOUTH SURGERY      as child            Medications     Current Outpatient Prescriptions   Medication     HYDROcodone-acetaminophen (NORCO) 5-325 MG per tablet     cyclobenzaprine (FLEXERIL) 10 MG tablet     diclofenac (VOLTAREN) 50 MG EC tablet     Omeprazole (PRILOSEC PO)     ondansetron (ZOFRAN ODT) 4 MG ODT tab     No current facility-administered medications for this visit.             Family History:     Family History   Problem Relation Age of Onset     Congenital Anomalies Daughter      hydrocephalus     Hypertension Maternal Grandmother      Other Cancer Maternal Grandfather      lung     Substance Abuse Maternal Grandfather      alcohol     Other Cancer Paternal Grandmother      lung     Depression Father      suicide     Depression Mother      Substance Abuse Mother      drugs- in treatment     Substance Abuse Paternal Grandfather      alcohol            Social History:     Social History     Social History     Marital status:      Spouse name: N/A     Number of children: N/A     Years of education: N/A     Occupational History     Not on file.     Social History Main Topics     Smoking status: Former Smoker     Quit date: 2015     Smokeless tobacco: Never Used     Alcohol use No      Comment: 2 drinks weekly- quit with pregnancy      Drug use: No     Sexual activity: Yes     Partners: Male     Birth control/ protection: IUD, Female Surgical     Other Topics Concern     Parent/Sibling W/ Cabg, Mi Or Angioplasty Before 65f 55m? No     Social History Narrative            Allergies:   Review of patient's allergies indicates no known allergies.         Review of Systems:  From intake questionnaire   Negative 14 system review except as noted on HPI, nurse's note.         Physical Exam:   Patient is a 32 year old  female   Vitals: Blood pressure 124/80, pulse 76, weight 69.5 kg (153 lb 4.8 oz), currently breastfeeding.  General Appearance Adult: Alert, no acute distress, oriented  HENT: throat/mouth:normal, good dentition  Neck: No adenopathy,masses or thyromegaly  Lungs: no respiratory distress, or pursed lip breathing  Heart: No obvious jugular venous distension present  Abdomen: soft, nontender, no organomegaly or masses, no cva tenderness bilaterally Body mass index is 26.31 kg/(m^2).  Lymphatics: No cervical or supraclavicular adenopathy  Musculoskeltal: extremities normal, no peripheral edema  Skin: no suspicious lesions or rashes  Neuro: Alert, oriented, speech and mentation normal  Psych: affect and mood normal  Gait: Normal  : Deferred       Labs and Pathology:    I personally reviewed all applicable laboratory data and went over findings with patient  Significant for:    CBC RESULTS:  Recent Labs   Lab Test  09/22/17   1900  08/04/17   0720  11/28/16   1126  10/15/16   1555  10/15/16   0653  10/13/16   1140  10/11/16   1149   WBC  8.2  4.9  8.0   --    --   7.5  8.0   HGB  13.9   --   12.9  6.8*  6.5*  10.3*  11.0*   PLT  236   --   284   --    --   177  199        BMP RESULTS:  Recent Labs   Lab Test  09/25/17   0935  09/22/17   1900  11/28/16   1126  10/11/16   1149   10/02/16   2313   NA  141  140  139   --    --   140   POTASSIUM  3.9  3.4  3.5   --    --   3.4   CHLORIDE  108  107  104   --    --   109   CO2  26  24  26   --    --   21    ANIONGAP  7  9  9   --    --   10   GLC  84  85  83   --    --   99   BUN  11  12  14   --    --   6*   CR  0.76  0.68  0.74  0.65   < >  0.54   GFRESTIMATED  89  >90  >90  Non African American GFR Calc    >90  Non  GFR Calc     < >  >90  Non  GFR Calc     GFRESTBLACK  >90  >90  >90  African American GFR Calc    >90   GFR Calc     < >  >90   GFR Calc     MEG  9.5  9.3  9.2   --    --   8.7    < > = values in this interval not displayed.       UA RESULTS:   Recent Labs   Lab Test  10/17/17   0815  09/22/17   1840  10/04/16   2300   04/15/16   1108   SG  <=1.005  1.000*  1.018   < >  <=1.005   URINEPH  6.0  6.5  7.0   < >  7.0   NITRITE  Negative  Negative  Negative   < >  Negative   RBCU  O - 2   --   1   --   O - 2   WBCU  O - 2   --   9*   --   O - 2    < > = values in this interval not displayed.       CALCIUM RESULTS  Lab Results   Component Value Date    MEG 9.5 09/25/2017    MEG 9.3 09/22/2017    MEG 9.2 11/28/2016           Imaging:    I personally reviewed all applicable imaging and went over findings with patient.  Significant for:    Results for orders placed or performed during the hospital encounter of 09/26/17   CT Abdomen Pelvis w/o & w Contrast    Narrative    CT ABDOMEN AND PELVIS WITHOUT AND WITH CONTRAST 9/26/2017 3:51 PM    HISTORY: Right lower abdominal pain. Evaluate for renal stones.  Evaluate for nephrocalcinosis.    TECHNIQUE: Helical axial scans from dome of liver through pubic  symphysis without and with 99 mL Isovue-370 IV contrast. Radiation  dose for this scan was reduced using automated exposure control,  adjustment of the mA and/or kV according to patient size, or iterative  reconstruction technique.    COMPARISON: Contrast enhanced exam 6/22/2017.    FINDINGS: There is a 5 mm nonobstructing calculus in the lower pole  collecting system of the right kidney and a 2 mm nonobstructing  calculus in the upper pole collecting  system of the left kidney, both  unchanged. No evidence for urinary tract obstruction bilaterally.    Renal parenchymal enhancement is normal with no parenchymal mass  lesions. Renal collecting systems, pelves and visualized ureters are  unremarkable.    The liver, spleen, pancreas and bilateral adrenal glands appear normal  and unchanged. The bowel mesentery in the upper abdomen are  unremarkable.    Scans through the pelvis show a small amount of free fluid in the  cul-de-sac which was present but slightly less on the prior exam.  Intrauterine device is seen in the expected location in the  endometrial canal. The appendix is identified and appears normal.      Impression    IMPRESSION:  1. Small nonobstructing calculi in the lower pole collecting system of  the right kidney and upper pole collecting system of the left kidney.  No urinary tract obstruction. No other urinary tract abnormal  calcifications.  2. No acute appearing abnormality in the abdomen or pelvis and no  change since the prior exam.    MARGO KANG MD              Assessment and Plan:     Assessment:  33 yo F w bilateral nonobstructing kidney stones    Plan:  -We reviewed general recommendations to prevent kidney stones including the followin) Low oxalate diet. We discussed foods that are particularly high in oxalate including spinach, rhubarb, beets, nuts, nut butters, and black teas.     2) Low salt diet. Discussed common foods with high amounts of salt as well as dietary strategies to minimize sodium.     3) High fluid intake. Recommend 3 liters of fluid daily to produce goal of 2.5L of urine.     4) Modest animal protein. Recommend limiting animal protein to one serving or less daily.     5) Normal dietary calcium.    We discussed the nature of nonobstructing renal stones and pain.  We reviewed the fact that widely held opinion in the field of urology is that nonobstructing stones should not cause pain; however, several publications  have demonstrated improvement with stone removal in such circumstances.  We reviewed the risks of stone removal including but not limited to bleeding, infection, damage to urinary and adjacent structures, stricture and incomplete stone removal.  We discussed the potential need for staged procedures and went over the potential need for ureteral stent as well as associated symptoms.  Finally, we discussed that stone removal for pain in the absence of obstruction is not a guarantee to improve pain and that the potential for pain improvement is likely around 50%.      Ultimately the patient has elected to proceed with observation at this time. Will follow up in 6-8 weeks with 24hr urine analysis and symptom check.   Will also enroll in smart water bottle research study as a mechanism to help improve urine output.    Orders  No orders of the defined types were placed in this encounter.      Scribe Disclosure:   IDominick, am serving as a scribe; to document services personally performed by Kaiden Jefferson MD based on data collection and the provider's statements to me.     IKaiden saw and evaluated this patient and agree with the plan as stated above     Again, thank you for allowing me to participate in the care of your patient.      Sincerely,    Kaiden Jefferson MD      CC  Patient Care Team:  Esther Pearson APRN CNP as PCP - General (Nurse Practitioner - Gerontology)  ADELINA ABBASI

## 2017-12-20 LAB
BACTERIA SPEC CULT: NORMAL
Lab: NORMAL
SPECIMEN SOURCE: NORMAL

## 2018-01-02 ENCOUNTER — PRE VISIT (OUTPATIENT)
Dept: UROLOGY | Facility: CLINIC | Age: 33
End: 2018-01-02

## 2018-01-09 ENCOUNTER — TELEPHONE (OUTPATIENT)
Dept: UROLOGY | Facility: CLINIC | Age: 33
End: 2018-01-09

## 2018-01-09 NOTE — TELEPHONE ENCOUNTER
E-mailed patient about today appointment, had to cancel Dr. Ronny benson.. Left [facility] with call back # with pt. cdk

## 2018-01-16 ENCOUNTER — OFFICE VISIT (OUTPATIENT)
Dept: UROLOGY | Facility: CLINIC | Age: 33
End: 2018-01-16
Payer: COMMERCIAL

## 2018-01-16 ENCOUNTER — ALLIED HEALTH/NURSE VISIT (OUTPATIENT)
Dept: UROLOGY | Facility: CLINIC | Age: 33
End: 2018-01-16
Payer: COMMERCIAL

## 2018-01-16 VITALS
WEIGHT: 154.4 LBS | HEART RATE: 92 BPM | SYSTOLIC BLOOD PRESSURE: 114 MMHG | HEIGHT: 64 IN | BODY MASS INDEX: 26.36 KG/M2 | DIASTOLIC BLOOD PRESSURE: 74 MMHG

## 2018-01-16 DIAGNOSIS — N20.0 CALCULUS OF KIDNEY: ICD-10-CM

## 2018-01-16 DIAGNOSIS — N20.0 CALCULUS OF KIDNEY: Primary | ICD-10-CM

## 2018-01-16 LAB
ALBUMIN UR-MCNC: NEGATIVE MG/DL
ANION GAP SERPL CALCULATED.3IONS-SCNC: 6 MMOL/L (ref 3–14)
APPEARANCE UR: CLEAR
BACTERIA #/AREA URNS HPF: ABNORMAL /HPF
BILIRUB UR QL STRIP: NEGATIVE
BUN SERPL-MCNC: 9 MG/DL (ref 7–30)
CALCIUM SERPL-MCNC: 8.8 MG/DL (ref 8.5–10.1)
CHLORIDE SERPL-SCNC: 104 MMOL/L (ref 94–109)
CO2 SERPL-SCNC: 28 MMOL/L (ref 20–32)
COLOR UR AUTO: YELLOW
CREAT SERPL-MCNC: 0.73 MG/DL (ref 0.52–1.04)
ERYTHROCYTE [DISTWIDTH] IN BLOOD BY AUTOMATED COUNT: 11.9 % (ref 10–15)
GFR SERPL CREATININE-BSD FRML MDRD: >90 ML/MIN/1.7M2
GLUCOSE SERPL-MCNC: 92 MG/DL (ref 70–99)
GLUCOSE UR STRIP-MCNC: NEGATIVE MG/DL
HCT VFR BLD AUTO: 44.3 % (ref 35–47)
HGB BLD-MCNC: 14.5 G/DL (ref 11.7–15.7)
HGB UR QL STRIP: ABNORMAL
KETONES UR STRIP-MCNC: NEGATIVE MG/DL
LEUKOCYTE ESTERASE UR QL STRIP: NEGATIVE
MCH RBC QN AUTO: 28.9 PG (ref 26.5–33)
MCHC RBC AUTO-ENTMCNC: 32.7 G/DL (ref 31.5–36.5)
MCV RBC AUTO: 88 FL (ref 78–100)
MUCOUS THREADS #/AREA URNS LPF: PRESENT /LPF
NITRATE UR QL: POSITIVE
PH UR STRIP: 6 PH (ref 5–7)
PLATELET # BLD AUTO: 237 10E9/L (ref 150–450)
POTASSIUM SERPL-SCNC: 3.5 MMOL/L (ref 3.4–5.3)
RBC # BLD AUTO: 5.01 10E12/L (ref 3.8–5.2)
RBC #/AREA URNS AUTO: 1 /HPF (ref 0–2)
SODIUM SERPL-SCNC: 139 MMOL/L (ref 133–144)
SOURCE: ABNORMAL
SP GR UR STRIP: 1.01 (ref 1–1.03)
SQUAMOUS #/AREA URNS AUTO: 1 /HPF (ref 0–1)
UROBILINOGEN UR STRIP-MCNC: 0 MG/DL (ref 0–2)
WBC # BLD AUTO: 7.3 10E9/L (ref 4–11)
WBC #/AREA URNS AUTO: 1 /HPF (ref 0–2)

## 2018-01-16 ASSESSMENT — PAIN SCALES - GENERAL: PAINLEVEL: MODERATE PAIN (4)

## 2018-01-16 NOTE — MR AVS SNAPSHOT
After Visit Summary   1/16/2018    Grace Dominguez    MRN: [id number]           Patient Information     Date Of Birth          1985        Visit Information        Provider Department      1/16/2018 7:30 AM Kaiden Jefferson MD [facility] Urology and [facility] for Prostate and Urologic Cancers        Today's Diagnoses     Calculus of kidney    -  1       Follow-ups after your visit        Future tests that were ordered for you today     Open Future Orders        Priority Expected Expires Ordered    CBC with platelets Routine  1/16/2019 1/16/2018    Basic metabolic panel Routine  1/16/2019 1/16/2018    Routine UA with micro reflex to culture Routine  1/16/2019 1/16/2018            Who to contact     Please call your clinic at [phone] to:    Ask questions about your health    Make or cancel appointments    Discuss your medicines    Learn about your test results    Speak to your doctor   If you have compliments or concerns about an experience at your clinic, or if you wish to file a complaint, please contact [facility] Patient Relations at [phone] or email us at [email or web address]         Additional Information About Your Visit        MyChart Information     [email or web address] gives you secure access to your electronic health record. If you see a primary care provider, you can also send messages to your care team and make appointments. If you have questions, please call your primary care clinic.  If you do not have a primary care provider, please call [phone] and they will assist you.      [employer] is an electronic gateway that provides easy, online access to your medical records. With [employer], you can request a clinic appointment, read your test results, renew a prescription or communicate with your care team.     To access your existing account, please contact your [facility] or call [phone] for assistance.        Care  "EveryWhere ID     This is your Care EveryWhere ID. This could be used by other organizations to access your [place] medical records  [id number]        Your Vitals Were     Pulse Height BMI (Body Mass Index)             92 1.626 m (5' 4\") 26.5 kg/m2          Blood Pressure from Last 3 Encounters:   01/16/18 114/74   12/19/17 124/80   10/17/17 110/78    Weight from Last 3 Encounters:   01/16/18 70 kg (154 lb 6.4 oz)   12/19/17 69.5 kg (153 lb 4.8 oz)   10/17/17 69.6 kg (153 lb 8 oz)              We Performed the Following     [employer]-Operative Worksheet (Urology)        Primary Care Provider Office Phone # Fax #    Esther Jones Lili APREPIFANIO [phone] [address] [address]        Equal Access to Services     LILIANA TREVIZO : Hadii aad ku hadasho Solarissaali, waaxda luqadaha, qaybta kaalmada adeegyada, waxay dustinin tosha [place] . So [facility] [phone].    ATENCIÓN: Si habla español, tiene a awad disposición servicios gratuitos de asistencia lingüística. Isidro al [phone].    We comply with applicable federal civil rights laws and [place] laws. We do not discriminate on the basis of race, color, national origin, age, disability, sex, sexual orientation, or gender identity.            Thank you!     Thank you for choosing [facility] UROLOGY AND [facility] FOR PROSTATE AND UROLOGIC CANCERS  for your care. Our goal is always to provide you with excellent care. Hearing back from our patients is one way we can continue to improve our services. Please take a few minutes to complete the written survey that you may receive in the mail after your visit with us. Thank you!             Your Updated Medication List - Protect others around you: Learn how to safely use, store and throw away your medicines at www.disposemymeds.org.          This list is accurate as of: 1/16/18  7:54 AM.  Always use your most recent med list.                   Brand Name Dispense Instructions for use " Diagnosis    cyclobenzaprine 10 MG tablet    FLEXERIL    60 tablet    Take 0.5-1 tablets (5-10 mg) by mouth 3 times daily as needed for muscle spasms    Bilateral thoracic back pain, unspecified chronicity       diclofenac 50 MG EC tablet    VOLTAREN    60 tablet    Take 1 tablet (50 mg) by mouth 3 times daily as needed for moderate pain    Bilateral thoracic back pain, unspecified chronicity       HYDROcodone-acetaminophen 5-325 MG per tablet    NORCO          ondansetron 4 MG ODT tab    ZOFRAN ODT    7 tablet    Take 1 tablet (4 mg) by mouth every 8 hours as needed        PRILOSEC PO      Take 10 mg by mouth daily

## 2018-01-16 NOTE — NURSING NOTE
Chief Complaint   Patient presents with     Consult     Stone patient following up to discuss treatment options after recent ongoing pain.     [facility]

## 2018-01-16 NOTE — MR AVS SNAPSHOT
After Visit Summary   1/16/2018    Grace Dominguez    MRN: [id number]           Patient Information     Date Of Birth          1985        Visit Information        Provider Department      1/16/2018 8:15 AM Nurse, [facility]state [facility] Urology and [facility] for Prostate and Urologic Cancers        Today's Diagnoses     Calculus of kidney    -  1       Follow-ups after your visit        Who to contact     Please call your clinic at [phone] to:    Ask questions about your health    Make or cancel appointments    Discuss your medicines    Learn about your test results    Speak to your doctor   If you have compliments or concerns about an experience at your clinic, or if you wish to file a complaint, please contact [facility] Patient Relations at [phone] or email us at [email or web address]         Additional Information About Your Visit        MyChart Information     [employer] gives you secure access to your electronic health record. If you see a primary care provider, you can also send messages to your care team and make appointments. If you have questions, please call your primary care clinic.  If you do not have a primary care provider, please call [phone] and they will assist you.      [employer] is an electronic gateway that provides easy, online access to your medical records. With [employer], you can request a clinic appointment, read your test results, renew a prescription or communicate with your care team.     To access your existing account, please contact your [facility] or call [phone] for assistance.        Care EveryWhere ID     This is your Care EveryWhere ID. This could be used by other organizations to access your [place] medical records  [id number]         Blood Pressure from Last 3 Encounters:   01/16/18 114/74   12/19/17 124/80   10/17/17 110/78    Weight from Last 3 Encounters:   01/16/18 70  kg (154 lb 6.4 oz)   12/19/17 69.5 kg (153 lb 4.8 oz)   10/17/17 69.6 kg (153 lb 8 oz)              Today, you had the following     No orders found for display       Primary Care Provider Office Phone # Fax JUNG Peguero [phone] [address] [facility] [address]        Equal Access to Services     LILIANA TREVIZO : Hadii aad ku hadasho Soomaali, waaxda luqadaha, qaybta kaalmada adeegyada, waxay idiin hayaan adeeg kharash lahadley . So [facility] [phone].    ATENCIÓN: Si edward saucedo, tiene a awad disposición servicios gratuitos de asistencia lingüística. Llame al [phone].    We comply with applicable federal civil rights laws and [place] laws. We do not discriminate on the basis of race, color, national origin, age, disability, sex, sexual orientation, or gender identity.            Thank you!     Thank you for choosing [facility] UROLOGY AND [facility] FOR PROSTATE AND UROLOGIC CANCERS  for your care. Our goal is always to provide you with excellent care. Hearing back from our patients is one way we can continue to improve our services. Please take a few minutes to complete the written survey that you may receive in the mail after your visit with us. Thank you!             Your Updated Medication List - Protect others around you: Learn how to safely use, store and throw away your medicines at www.disposemymeds.org.          This list is accurate as of: 1/16/18  8:37 AM.  Always use your most recent med list.                   Brand Name Dispense Instructions for use Diagnosis    cyclobenzaprine 10 MG tablet    FLEXERIL    60 tablet    Take 0.5-1 tablets (5-10 mg) by mouth 3 times daily as needed for muscle spasms    Bilateral thoracic back pain, unspecified chronicity       diclofenac 50 MG EC tablet    VOLTAREN    60 tablet    Take 1 tablet (50 mg) by mouth 3 times daily as needed for moderate pain    Bilateral thoracic back pain, unspecified chronicity        HYDROcodone-acetaminophen 5-325 MG per tablet    NORCO          ondansetron 4 MG ODT tab    ZOFRAN ODT    7 tablet    Take 1 tablet (4 mg) by mouth every 8 hours as needed        PRILOSEC PO      Take 10 mg by mouth daily

## 2018-01-16 NOTE — PROGRESS NOTES
"  UROLOGY FOLLOW-UP NOTE          Chief Complaint:   Today I had the pleasure of seeing Ms. Grace Dominguez in follow-up for a chief complaint of kidney stones.          Interval Update    Grace Dominguez is a very pleasant 32 year old female     Brief  History:  She has a history of bilateral non obstructing kidney stones. She was previously evaluated and enrolled in the smart water bottle study for low urine volume sotne risk.  She has been intermittantly compliant with this and is awaiting f/u 24 hour testing.    Today notes:   Today she notes continued right flank pain. She states her pain gradually worsens during the course of the day. She denies any history of gross hematuria.  She has not had recent infection.  She has had her right flank pain evaluated by PMD, has tried strengthening exercises and rehab, has tried different medications including NSAID, muscle relaxant and opioids, none have helped.         Physical Exam:   Patient is a 32 year old  female   Vitals: Blood pressure 114/74, pulse 92, height 1.626 m (5' 4\"), weight 70 kg (154 lb 6.4 oz), currently breastfeeding.  Notable Findings on Exam Well nourished middle aged female in no distress  No reproduceable flank tenderness to palpation      Labs and Pathology:    I personally reviewed all applicable laboratory data and went over findings with patient  Significant for:    CBC RESULTS:  Recent Labs   Lab Test  09/22/17   1900  08/04/17   0720  11/28/16   1126  10/15/16   1555  10/15/16   0653  10/13/16   1140  10/11/16   1149   WBC  8.2  4.9  8.0   --    --   7.5  8.0   HGB  13.9   --   12.9  6.8*  6.5*  10.3*  11.0*   PLT  236   --   284   --    --   177  199        BMP RESULTS:  Recent Labs   Lab Test  09/25/17   0935  09/22/17   1900  11/28/16   1126  10/11/16   1149   10/02/16   2313   NA  141  140  139   --    --   140   POTASSIUM  3.9  3.4  3.5   --    --   3.4   CHLORIDE  108  107  104   --    --   109   CO2  26  24  26   --    --   21 "   ANIONGAP  7  9  9   --    --   10   GLC  84  85  83   --    --   99   BUN  11  12  14   --    --   6*   CR  0.76  0.68  0.74  0.65   < >  0.54   GFRESTIMATED  89  >90  >90  Non African American GFR Calc    >90  Non  GFR Calc     < >  >90  Non  GFR Calc     GFRESTBLACK  >90  >90  >90  African American GFR Calc    >90   GFR Calc     < >  >90   GFR Calc     MEG  9.5  9.3  9.2   --    --   8.7    < > = values in this interval not displayed.       UA RESULTS:   Recent Labs   Lab Test  12/19/17   1135  10/17/17   0815  09/22/17   1840  10/04/16   2300   SG  1.009  <=1.005  1.000*  1.018   URINEPH  6.0  6.0  6.5  7.0   NITRITE  Negative  Negative  Negative  Negative   RBCU  1  O - 2   --   1   WBCU  1  O - 2   --   9*         Imaging:    I personally reviewed all applicable imaging and went over the below findings with patient.    Results for orders placed or performed during the hospital encounter of 09/26/17   CT Abdomen Pelvis w/o & w Contrast    Narrative    CT ABDOMEN AND PELVIS WITHOUT AND WITH CONTRAST 9/26/2017 3:51 PM    HISTORY: Right lower abdominal pain. Evaluate for renal stones.  Evaluate for nephrocalcinosis.    TECHNIQUE: Helical axial scans from dome of liver through pubic  symphysis without and with 99 mL Isovue-370 IV contrast. Radiation  dose for this scan was reduced using automated exposure control,  adjustment of the mA and/or kV according to patient size, or iterative  reconstruction technique.    COMPARISON: Contrast enhanced exam 6/22/2017.    FINDINGS: There is a 5 mm nonobstructing calculus in the lower pole  collecting system of the right kidney and a 2 mm nonobstructing  calculus in the upper pole collecting system of the left kidney, both  unchanged. No evidence for urinary tract obstruction bilaterally.    Renal parenchymal enhancement is normal with no parenchymal mass  lesions. Renal collecting systems, pelves and visualized  ureters are  unremarkable.    The liver, spleen, pancreas and bilateral adrenal glands appear normal  and unchanged. The bowel mesentery in the upper abdomen are  unremarkable.    Scans through the pelvis show a small amount of free fluid in the  cul-de-sac which was present but slightly less on the prior exam.  Intrauterine device is seen in the expected location in the  endometrial canal. The appendix is identified and appears normal.      Impression    IMPRESSION:  1. Small nonobstructing calculi in the lower pole collecting system of  the right kidney and upper pole collecting system of the left kidney.  No urinary tract obstruction. No other urinary tract abnormal  calcifications.  2. No acute appearing abnormality in the abdomen or pelvis and no  change since the prior exam.    MARGO KANG MD            Assessment/Plan   32 year old female with 5 mm right renal stone and flank pain    We discussed the nature of nonobstructing renal stones and pain.  We reviewed the fact that widely held opinion in the field of urology is that nonobstructing stones should not cause pain; however, several publications have demonstrated improvement with stone removal in such circumstances.  We reviewed the risks of stone removal including but not limited to bleeding, infection, damage to urinary and adjacent structures, stricture and incomplete stone removal.  We discussed the potential need for staged procedures and went over the potential need for ureteral stent as well as associated symptoms.  Finally, we discussed that stone removal for pain in the absence of obstruction is not a guarantee to improve pain and that the potential for pain improvement is likely around 50%.      Ultimately, the shared decision was made to proceed with attempted stone removal via a ureteroscopic approach             Past Medical History:     Past Medical History:   Diagnosis Date     Abnormal Pap smear of cervix age 25    colposcopy normal and  paps normal since     Chickenpox      History of blood transfusion 2005    After first      History of blood transfusion reaction     with her      History of colposcopy with cervical biopsy 16    visually normal, no bx taken, pt pregnant     Papanicolaou smear of cervix with low grade squamous intraepithelial lesion (LGSIL) 4/15/16    + HR HPV     PUPP (pruritic urticarial papules and plaques of pregnancy)             Past Surgical History:     Past Surgical History:   Procedure Laterality Date     BIOPSY  10/17/2016    Skin biopsy due to terrible rash     C/SECTION, LOW TRANSVERSE      , Low Transverse      SECTION, TUBAL LIGATION, COMBINED N/A 10/13/2016    Procedure: COMBINED  SECTION, TUBAL LIGATION;  Surgeon: Nicole Pitts MD;  Location: [facility]     ESOPHAGOSCOPY, GASTROSCOPY, DUODENOSCOPY (EGD), COMBINED N/A 2017    Procedure: COMBINED ESOPHAGOSCOPY, GASTROSCOPY, DUODENOSCOPY (EGD);  gastroscopy;  Surgeon: Milton Haines MD;  Location: [facility]     MOUTH SURGERY      wisdom teeth     MOUTH SURGERY      as child            Medications     Current Outpatient Prescriptions   Medication     Omeprazole (PRILOSEC PO)     HYDROcodone-acetaminophen (NORCO) 5-325 MG per tablet     cyclobenzaprine (FLEXERIL) 10 MG tablet     diclofenac (VOLTAREN) 50 MG EC tablet     ondansetron (ZOFRAN ODT) 4 MG ODT tab     No current facility-administered medications for this visit.             Family History:     Family History   Problem Relation Age of Onset     Congenital Anomalies Daughter      hydrocephalus     Hypertension Maternal Grandmother      Other Cancer Maternal Grandfather      lung     Substance Abuse Maternal Grandfather      alcohol     Other Cancer Paternal Grandmother      lung     Depression Father      suicide     Depression Mother      Substance Abuse Mother      drugs- in treatment     Substance Abuse Paternal Grandfather      alcohol             Social History:     Social History     Social History     Marital status:      Spouse name: N/A     Number of children: N/A     Years of education: N/A     Occupational History     Not on file.     Social History Main Topics     Smoking status: Former Smoker     Quit date: 12/13/2015     Smokeless tobacco: Never Used     Alcohol use No      Comment: 2 drinks weekly- quit with pregnancy     Drug use: No     Sexual activity: Yes     Partners: Male     Birth control/ protection: IUD, Female Surgical     Other Topics Concern     Parent/Sibling W/ Cabg, Mi Or Angioplasty Before 65f 55m? No     Social History Narrative            Allergies:   Review of patient's allergies indicates no known allergies.         Review of Systems:  From intake questionnaire   Negative 14 system review except as noted on HPI, nurse's note.      Scribe Disclosure:   IDominick, am serving as a scribe; to document services personally performed by Kaiden Jefferson MD based on data collection and the provider's statements to me.     IKaiden saw and evaluated this patient and agree with the plan as stated above    CC:  Esther Pearson      >15 minutes were spent face to face with patient over half of which was spent providing medical counseling regarding kidney stones and flank pain.

## 2018-01-16 NOTE — LETTER
"1/16/2018       RE: Grace Dominguez  [address] [place] [id number]     Dear Colleague,    Thank you for referring your patient, Grace Dominguez, to the [facility] PROSTATE [facility] UROLOGIC CANCERS at [facility]. Please see a copy of my visit note below.      UROLOGY FOLLOW-UP NOTE          Chief Complaint:   Today I had the pleasure of seeing Ms. Grace Dominguez in follow-up for a chief complaint of kidney stones.          Interval Update    Grace Dominguez is a very pleasant 32 year old female     Brief  History:  She has a history of bilateral non obstructing kidney stones. She was previously evaluated and enrolled in the smart water bottle study for low urine volume sotne risk.  She has been intermittantly compliant with this and is awaiting f/u 24 hour testing.    Today notes:   Today she notes continued right flank pain. She states her pain gradually worsens during the course of the day. She denies any history of gross hematuria.  She has not had recent infection.  She has had her right flank pain evaluated by PMD, has tried strengthening exercises and rehab, has tried different medications including NSAID, muscle relaxant and opioids, none have helped.         Physical Exam:   Patient is a 32 year old  female   Vitals: Blood pressure 114/74, pulse 92, height 1.626 m (5' 4\"), weight 70 kg (154 lb 6.4 oz), currently breastfeeding.  Notable Findings on Exam Well nourished middle aged female in no distress  No reproduceable flank tenderness to palpation      Labs and Pathology:    I personally reviewed all applicable laboratory data and went over findings with patient  Significant for:    CBC RESULTS:  Recent Labs   Lab Test  09/22/17   1900  08/04/17   0720  11/28/16   1126  10/15/16   1555  10/15/16   0653  10/13/16   1140  10/11/16   1149   WBC  8.2  4.9  8.0   --    --   7.5  8.0   HGB  13.9   --   12.9  6.8*  6.5*  10.3*  11.0*   PLT  236   --   284  "  --    --   177  199        BMP RESULTS:  Recent Labs   Lab Test  09/25/17   0935  09/22/17   1900  11/28/16   1126  10/11/16   1149   10/02/16   2313   NA  141  140  139   --    --   140   POTASSIUM  3.9  3.4  3.5   --    --   3.4   CHLORIDE  108  107  104   --    --   109   CO2  26  24  26   --    --   21   ANIONGAP  7  9  9   --    --   10   GLC  84  85  83   --    --   99   BUN  11  12  14   --    --   6*   CR  0.76  0.68  0.74  0.65   < >  0.54   GFRESTIMATED  89  >90  >90  Non African American GFR Calc    >90  Non  GFR Calc     < >  >90  Non  GFR Calc     GFRESTBLACK  >90  >90  >90  African American GFR Calc    >90   GFR Calc     < >  >90   GFR Calc     MEG  9.5  9.3  9.2   --    --   8.7    < > = values in this interval not displayed.       UA RESULTS:   Recent Labs   Lab Test  12/19/17   1135  10/17/17   0815  09/22/17   1840  10/04/16   2300   SG  1.009  <=1.005  1.000*  1.018   URINEPH  6.0  6.0  6.5  7.0   NITRITE  Negative  Negative  Negative  Negative   RBCU  1  O - 2   --   1   WBCU  1  O - 2   --   9*         Imaging:    I personally reviewed all applicable imaging and went over the below findings with patient.    Results for orders placed or performed during the hospital encounter of 09/26/17   CT Abdomen Pelvis w/o & w Contrast    Narrative    CT ABDOMEN AND PELVIS WITHOUT AND WITH CONTRAST 9/26/2017 3:51 PM    HISTORY: Right lower abdominal pain. Evaluate for renal stones.  Evaluate for nephrocalcinosis.    TECHNIQUE: Helical axial scans from dome of liver through pubic  symphysis without and with 99 mL Isovue-370 IV contrast. Radiation  dose for this scan was reduced using automated exposure control,  adjustment of the mA and/or kV according to patient size, or iterative  reconstruction technique.    COMPARISON: Contrast enhanced exam 6/22/2017.    FINDINGS: There is a 5 mm nonobstructing calculus in the lower pole  collecting system  of the right kidney and a 2 mm nonobstructing  calculus in the upper pole collecting system of the left kidney, both  unchanged. No evidence for urinary tract obstruction bilaterally.    Renal parenchymal enhancement is normal with no parenchymal mass  lesions. Renal collecting systems, pelves and visualized ureters are  unremarkable.    The liver, spleen, pancreas and bilateral adrenal glands appear normal  and unchanged. The bowel mesentery in the upper abdomen are  unremarkable.    Scans through the pelvis show a small amount of free fluid in the  cul-de-sac which was present but slightly less on the prior exam.  Intrauterine device is seen in the expected location in the  endometrial canal. The appendix is identified and appears normal.      Impression    IMPRESSION:  1. Small nonobstructing calculi in the lower pole collecting system of  the right kidney and upper pole collecting system of the left kidney.  No urinary tract obstruction. No other urinary tract abnormal  calcifications.  2. No acute appearing abnormality in the abdomen or pelvis and no  change since the prior exam.    MARGO KANG MD            Assessment/Plan   32 year old female with 5 mm right renal stone and flank pain    We discussed the nature of nonobstructing renal stones and pain.  We reviewed the fact that widely held opinion in the field of urology is that nonobstructing stones should not cause pain; however, several publications have demonstrated improvement with stone removal in such circumstances.  We reviewed the risks of stone removal including but not limited to bleeding, infection, damage to urinary and adjacent structures, stricture and incomplete stone removal.  We discussed the potential need for staged procedures and went over the potential need for ureteral stent as well as associated symptoms.  Finally, we discussed that stone removal for pain in the absence of obstruction is not a guarantee to improve pain and that the  potential for pain improvement is likely around 50%.      Ultimately, the shared decision was made to proceed with attempted stone removal via a ureteroscopic approach             Past Medical History:     Past Medical History:   Diagnosis Date     Abnormal Pap smear of cervix age 25    colposcopy normal and paps normal since     Chickenpox      History of blood transfusion 2005    After first      History of blood transfusion reaction     with her      History of colposcopy with cervical biopsy 16    visually normal, no bx taken, pt pregnant     Papanicolaou smear of cervix with low grade squamous intraepithelial lesion (LGSIL) 4/15/16    + HR HPV     PUPP (pruritic urticarial papules and plaques of pregnancy)             Past Surgical History:     Past Surgical History:   Procedure Laterality Date     BIOPSY  10/17/2016    Skin biopsy due to terrible rash     C/SECTION, LOW TRANSVERSE      , Low Transverse      SECTION, TUBAL LIGATION, COMBINED N/A 10/13/2016    Procedure: COMBINED  SECTION, TUBAL LIGATION;  Surgeon: Nicole Pitts MD;  Location: [facility]     ESOPHAGOSCOPY, GASTROSCOPY, DUODENOSCOPY (EGD), COMBINED N/A 2017    Procedure: COMBINED ESOPHAGOSCOPY, GASTROSCOPY, DUODENOSCOPY (EGD);  gastroscopy;  Surgeon: Milton Haines MD;  Location: [facility]     MOUTH SURGERY      wisdom teeth     MOUTH SURGERY      as child            Medications     Current Outpatient Prescriptions   Medication     Omeprazole (PRILOSEC PO)     HYDROcodone-acetaminophen (NORCO) 5-325 MG per tablet     cyclobenzaprine (FLEXERIL) 10 MG tablet     diclofenac (VOLTAREN) 50 MG EC tablet     ondansetron (ZOFRAN ODT) 4 MG ODT tab     No current facility-administered medications for this visit.             Family History:     Family History   Problem Relation Age of Onset     Congenital Anomalies Daughter      hydrocephalus     Hypertension Maternal Grandmother      Other  Cancer Maternal Grandfather      lung     Substance Abuse Maternal Grandfather      alcohol     Other Cancer Paternal Grandmother      lung     Depression Father      suicide     Depression Mother      Substance Abuse Mother      drugs- in treatment     Substance Abuse Paternal Grandfather      alcohol            Social History:     Social History     Social History     Marital status:      Spouse name: N/A     Number of children: N/A     Years of education: N/A     Occupational History     Not on file.     Social History Main Topics     Smoking status: Former Smoker     Quit date: 12/13/2015     Smokeless tobacco: Never Used     Alcohol use No      Comment: 2 drinks weekly- quit with pregnancy     Drug use: No     Sexual activity: Yes     Partners: Male     Birth control/ protection: IUD, Female Surgical     Other Topics Concern     Parent/Sibling W/ Cabg, Mi Or Angioplasty Before 65f 55m? No     Social History Narrative            Allergies:   Review of patient's allergies indicates no known allergies.         Review of Systems:  From intake questionnaire   Negative 14 system review except as noted on HPI, nurse's note.      Scribe Disclosure:   Dominick SHARIF, am serving as a scribe; to document services personally performed by Kaiden Jefferson MD based on data collection and the provider's statements to me.     IKaiden saw and evaluated this patient and agree with the plan as stated above    CC:  Esther Pearson      >15 minutes were spent face to face with patient over half of which was spent providing medical counseling regarding kidney stones and flank pain.     Again, thank you for allowing me to participate in the care of your patient.      Sincerely,    Kaiden Jefferson MD

## 2018-01-17 ENCOUNTER — OFFICE VISIT (OUTPATIENT)
Dept: FAMILY MEDICINE | Facility: CLINIC | Age: 33
End: 2018-01-17
Payer: COMMERCIAL

## 2018-01-17 VITALS
DIASTOLIC BLOOD PRESSURE: 73 MMHG | TEMPERATURE: 98.6 F | BODY MASS INDEX: 25.66 KG/M2 | SYSTOLIC BLOOD PRESSURE: 110 MMHG | HEART RATE: 97 BPM | WEIGHT: 154 LBS | HEIGHT: 65 IN

## 2018-01-17 DIAGNOSIS — N20.0 NEPHROLITHIASIS: ICD-10-CM

## 2018-01-17 DIAGNOSIS — Z01.818 PREOP GENERAL PHYSICAL EXAM: Primary | ICD-10-CM

## 2018-01-17 LAB
BACTERIA SPEC CULT: NORMAL
Lab: NORMAL
SPECIMEN SOURCE: NORMAL

## 2018-01-17 PROCEDURE — 99214 OFFICE O/P EST MOD 30 MIN: CPT | Performed by: NURSE PRACTITIONER

## 2018-01-17 RX ORDER — HYDROCODONE BITARTRATE AND ACETAMINOPHEN 5; 325 MG/1; MG/1
1 TABLET ORAL EVERY 8 HOURS PRN
Qty: 20 TABLET | Refills: 0 | Status: SHIPPED | OUTPATIENT
Start: 2018-01-17 | End: 2018-02-01

## 2018-01-17 NOTE — MR AVS SNAPSHOT
After Visit Summary   1/17/2018    Grace Dominguez    MRN: [id number]           Patient Information     Date Of Birth          1985        Visit Information        Provider Department      1/17/2018 3:40 PM Esther Pearson APRN [facility]        Today's Diagnoses     Preop general physical exam    -  1    Nephrolithiasis          Care Instructions      Before Your Surgery      Call your surgeon if there is any change in your health. This includes signs of a cold or flu (such as a sore throat, runny nose, cough, rash or fever).    Do not smoke, drink alcohol or take over the counter medicine (unless your surgeon or primary care doctor tells you to) for the 24 hours before and after surgery.    If you take prescribed drugs: Follow your doctor s orders about which medicines to take and which to stop until after surgery.    Eating and drinking prior to surgery: follow the instructions from your surgeon    Take a shower or bath the night before surgery. Use the soap your surgeon gave you to gently clean your skin. If you do not have soap from your surgeon, use your regular soap. Do not shave or scrub the surgery site.  Wear clean pajamas and have clean sheets on your bed.     Labs were normal yesterday            Follow-ups after your visit        Your next 10 appointments already scheduled     Feb 01, 2018   Procedure with Kaiden Jefferson MD   [facility] ([facility])    [address] [place], [place] [id number] [phone]           Located in the Clinics and Surgery Center at [address] [place] [address].   parking is very convenient and highly recommended.  is a $6 flat rate fee.  Both  and self parkers should enter the main arrival plaza from [facility]; parking attendants will direct you based on your parking preference.              Who to contact     If you have  "questions or need follow up information about today's clinic visit or your schedule please contact [facility] directly at [phone].  Normal or non-critical lab and imaging results will be communicated to you by MyChart, letter or phone within 4 business days after the clinic has received the results. If you do not hear from us within 7 days, please contact the clinic through [employer] or phone. If you have a critical or abnormal lab result, we will notify you by phone as soon as possible.  Submit refill requests through [employer] or call your pharmacy and they will forward the refill request to us. Please allow 3 business days for your refill to be completed.          Additional Information About Your Visit        [employer] Information     [employer] gives you secure access to your electronic health record. If you see a primary care provider, you can also send messages to your care team and make appointments. If you have questions, please call your primary care clinic.  If you do not have a primary care provider, please call [phone] and they will assist you.        Care EveryWhere ID     This is your Care EveryWhere ID. This could be used by other organizations to access your [facility] medical records  [id number]        Your Vitals Were     Pulse Temperature Height BMI (Body Mass Index)          97 98.6  F (37  C) (Tympanic) 5' 5\" (1.651 m) 25.63 kg/m2         Blood Pressure from Last 3 Encounters:   01/17/18 110/73   01/16/18 114/74   12/19/17 124/80    Weight from Last 3 Encounters:   01/17/18 154 lb (69.9 kg)   01/16/18 154 lb 6.4 oz (70 kg)   12/19/17 153 lb 4.8 oz (69.5 kg)              Today, you had the following     No orders found for display         Today's Medication Changes          These changes are accurate as of: 1/17/18  4:00 PM.  If you have any questions, ask your nurse or doctor.               These medicines have changed or have updated prescriptions.        Dose/Directions    " HYDROcodone-acetaminophen 5-325 MG per tablet   Commonly known as:  NORCO   This may have changed:    - how much to take  - how to take this  - when to take this  - reasons to take this   Used for:  Nephrolithiasis   Changed by:  Esther Pearson APRN CNP        Dose:  1 tablet   Take 1 tablet by mouth every 8 hours as needed for moderate to severe pain   Quantity:  20 tablet   Refills:  0            Where to get your medicines      Some of these will need a paper prescription and others can be bought over the counter.  Ask your nurse if you have questions.     Bring a paper prescription for each of these medications     HYDROcodone-acetaminophen 5-325 MG per tablet                Primary Care Provider Office Phone # Fax #    JUNG Ramos [phone] [address] [facility] [address]        Equal Access to Services     LILIANA TREVIZO : Asim conteh Sodaisy, waaxedmund luqadaha, qaybta kaalmaedmund santos, cherelle [place] . So [facility] [phone].    ATENCIÓN: Si habla español, tiene a awad disposición servicios gratuitos de asistencia lingüística. Isidro al [phone].    We comply with applicable federal civil rights laws and [place] laws. We do not discriminate on the basis of race, color, national origin, age, disability, sex, sexual orientation, or gender identity.            Thank you!     Thank you for choosing [facility]  for your care. Our goal is always to provide you with excellent care. Hearing back from our patients is one way we can continue to improve our services. Please take a few minutes to complete the written survey that you may receive in the mail after your visit with us. Thank you!             Your Updated Medication List - Protect others around you: Learn how to safely use, store and throw away your medicines at www.disposemymeds.org.          This list is accurate as of: 1/17/18  4:00 PM.  Always use your  most recent med list.                   Brand Name Dispense Instructions for use Diagnosis    cyclobenzaprine 10 MG tablet    FLEXERIL    60 tablet    Take 0.5-1 tablets (5-10 mg) by mouth 3 times daily as needed for muscle spasms    Bilateral thoracic back pain, unspecified chronicity       diclofenac 50 MG EC tablet    VOLTAREN    60 tablet    Take 1 tablet (50 mg) by mouth 3 times daily as needed for moderate pain    Bilateral thoracic back pain, unspecified chronicity       HYDROcodone-acetaminophen 5-325 MG per tablet    NORCO    20 tablet    Take 1 tablet by mouth every 8 hours as needed for moderate to severe pain    Nephrolithiasis       ondansetron 4 MG ODT tab    ZOFRAN ODT    7 tablet    Take 1 tablet (4 mg) by mouth every 8 hours as needed        PRILOSEC PO      Take 10 mg by mouth daily

## 2018-01-17 NOTE — PATIENT INSTRUCTIONS
Before Your Surgery      Call your surgeon if there is any change in your health. This includes signs of a cold or flu (such as a sore throat, runny nose, cough, rash or fever).    Do not smoke, drink alcohol or take over the counter medicine (unless your surgeon or primary care doctor tells you to) for the 24 hours before and after surgery.    If you take prescribed drugs: Follow your doctor s orders about which medicines to take and which to stop until after surgery.    Eating and drinking prior to surgery: follow the instructions from your surgeon    Take a shower or bath the night before surgery. Use the soap your surgeon gave you to gently clean your skin. If you do not have soap from your surgeon, use your regular soap. Do not shave or scrub the surgery site.  Wear clean pajamas and have clean sheets on your bed.     Labs were normal yesterday

## 2018-01-17 NOTE — NURSING NOTE
"Chief Complaint   Patient presents with     Pre-Op Exam       Initial /73  Pulse 97  Temp 98.6  F (37  C) (Tympanic)  Ht 5' 5\" (1.651 m)  Wt 154 lb (69.9 kg)  BMI 25.63 kg/m2 Estimated body mass index is 25.63 kg/(m^2) as calculated from the following:    Height as of this encounter: 5' 5\" (1.651 m).    Weight as of this encounter: 154 lb (69.9 kg).  Medication Reconciliation: complete  "

## 2018-01-17 NOTE — PROGRESS NOTES
[facility]  [address] [facility] [address]  [phone]  Dept: [phone]    PRE-OP EVALUATION:  Today's date: 2018    Grace Dominguez (: 1985) presents for pre-operative evaluation assessment as requested by Dr. Jefferson.  She requires evaluation and anesthesia risk assessment prior to undergoing surgery/procedure for treatment of  Cystoscopy right ureteroscopy, laser lithotripsy, stent placement     Date of Surgery/ Procedure: 18   Time of Surgery/ Procedure: 10:55 A.M.   Hospital/Surgical Facility: [facility]   Primary Physician: Esther Pearson  Type of Anesthesia Anticipated: General    Patient has a Health Care Directive or Living Will:  NO    1. NO - Do you have a history of heart attack, stroke, stent, bypass or surgery on an artery in the head, neck, heart or legs?  2. NO - Do you ever have any pain or discomfort in your chest?  3. NO - Do you have a history of  Heart Failure?  4. NO - Are you troubled by shortness of breath when: walking on the level, up a slight hill or at night?  5. NO - Do you currently have a cold, bronchitis or other respiratory infection?  6. NO - Do you have a cough, shortness of breath or wheezing?  7. NO - Do you sometimes get pains in the calves of your legs when you walk?  8. NO - Do you or anyone in your family have previous history of blood clots?  9. NO - Do you or does anyone in your family have a serious bleeding problem such as prolonged bleeding following surgeries or cuts?  10. YES - Have you ever had problems with anemia or been told to take iron pills? When she was pregnant   11. NO - Have you had any abnormal blood loss such as black, tarry or bloody stools, or abnormal vaginal bleeding?  12. YES - Have you ever had a blood transfusion? When she was pregnant   13. NO - Have you or any of your relatives ever had problems with anesthesia?  14. NO - Do you have sleep apnea, excessive snoring or daytime  drowsiness?  15. NO - Do you have any prosthetic heart valves?  16. NO - Do you have prosthetic joints?  17. NO - Is there any chance that you may be pregnant?        HPI:                                                      Brief HPI related to upcoming procedure: ongoing problem with right side flank pain for 5 months due to nephrolithiasis       See problem list for active medical problems.  Problems all longstanding and stable, except as noted/documented.  See ROS for pertinent symptoms related to these conditions.                                                                                                  .    MEDICAL HISTORY:                                                    Patient Active Problem List    Diagnosis Date Noted     ARDEN (generalized anxiety disorder) 2016     Priority: Medium     mirena IUD 2016     Priority: Medium     Mirena IUD placed today 16 by Faith Miguel MD  LOT# [id number]  Exp:          Pityriasis rosea 10/14/2016     Priority: Medium     Family history of congenital anomaly 04/15/2016     Priority: Medium     Daughter had hydrocephalus and developmental delay related to infection       Mild dysplasia of cervix 04/15/2016     Priority: Medium     10/14/09 LSIL  2/23/10 Galt: BRITTNY 1.   4/15/16 LSIL/ + HR HPV @ age 31. 12w1d pregnant. Plan colp   16: Galt visually normal, no biopsies taken. Plan cotest at 1 year.   16:Pap--NIL, neg HPV. Plan: pap and HPV due in 3 years.         Past Medical History:   Diagnosis Date     Abnormal Pap smear of cervix age 25    colposcopy normal and paps normal since     Chickenpox      History of blood transfusion 2005    After first      History of blood transfusion reaction     with her      History of colposcopy with cervical biopsy 16    visually normal, no bx taken, pt pregnant     Papanicolaou smear of cervix with low grade squamous intraepithelial lesion (LGSIL) 4/15/16    + HR HPV      PUPP (pruritic urticarial papules and plaques of pregnancy)      Past Surgical History:   Procedure Laterality Date     BIOPSY  10/17/2016    Skin biopsy due to terrible rash     C/SECTION, LOW TRANSVERSE      , Low Transverse      SECTION, TUBAL LIGATION, COMBINED N/A 10/13/2016    Procedure: COMBINED  SECTION, TUBAL LIGATION;  Surgeon: Nicole Pitts MD;  Location: [facility]     ESOPHAGOSCOPY, GASTROSCOPY, DUODENOSCOPY (EGD), COMBINED N/A 2017    Procedure: COMBINED ESOPHAGOSCOPY, GASTROSCOPY, DUODENOSCOPY (EGD);  gastroscopy;  Surgeon: Milton Haines MD;  Location: [facility]     MOUTH SURGERY      wisdom teeth     MOUTH SURGERY      as child     Current Outpatient Prescriptions   Medication Sig Dispense Refill     HYDROcodone-acetaminophen (NORCO) 5-325 MG per tablet Take 1 tablet by mouth every 8 hours as needed for moderate to severe pain 20 tablet 0     cyclobenzaprine (FLEXERIL) 10 MG tablet Take 0.5-1 tablets (5-10 mg) by mouth 3 times daily as needed for muscle spasms (Patient not taking: Reported on 2018) 60 tablet 3     diclofenac (VOLTAREN) 50 MG EC tablet Take 1 tablet (50 mg) by mouth 3 times daily as needed for moderate pain (Patient not taking: Reported on 2018) 60 tablet 3     Omeprazole (PRILOSEC PO) Take 10 mg by mouth daily       ondansetron (ZOFRAN ODT) 4 MG ODT tab Take 1 tablet (4 mg) by mouth every 8 hours as needed (Patient not taking: Reported on 10/17/2017) 7 tablet 0     OTC products: none    No Known Allergies   Latex Allergy: NO    Social History   Substance Use Topics     Smoking status: Former Smoker     Quit date: 2015     Smokeless tobacco: Never Used     Alcohol use No      Comment: 2 drinks weekly- quit with pregnancy     History   Drug Use No       REVIEW OF SYSTEMS:                                                    C: NEGATIVE for fever, chills, change in weight  I: NEGATIVE for worrisome rashes, moles or lesions  E:  "NEGATIVE for vision changes or irritation  E/M: NEGATIVE for ear, mouth and throat problems  R: NEGATIVE for significant cough or SOB  CV: NEGATIVE for chest pain, palpitations or peripheral edema  GI: NEGATIVE for nausea, abdominal pain, heartburn, or change in bowel habits  : NEGATIVE for frequency, dysuria, or hematuria  M: NEGATIVE for significant arthralgias or myalgia  N: NEGATIVE for weakness, dizziness or paresthesias  H: NEGATIVE for bleeding problems  P: NEGATIVE for changes in mood or affect    EXAM:                                                    /73  Pulse 97  Temp 98.6  F (37  C) (Tympanic)  Ht 5' 5\" (1.651 m)  Wt 154 lb (69.9 kg)  BMI 25.63 kg/m2    GENERAL APPEARANCE: healthy, alert and no distress     EYES: EOMI, PERRL     RESP: lungs clear to auscultation - no rales, rhonchi or wheezes     CV: regular rates and rhythm, normal S1 S2, no S3 or S4 and no murmur, click or rub     ABDOMEN: soft, nontender     MS: extremities normal- no gross deformities noted, no evidence of inflammation in joints, FROM in all extremities.     SKIN: no suspicious lesions or rashes     NEURO: Normal strength and tone, sensory exam grossly normal, mentation intact and speech normal     PSYCH: mentation appears normal. and affect normal/bright    DIAGNOSTICS:                                                    EKG: Not indicated due to non-vascular surgery and low risk of event (age <65 and without cardiac risk factors)  BMP, CBC and UA done yesterday, results are normal   Recent Labs   Lab Test  01/16/18   0828  09/25/17   0935  09/22/17   1900   HGB  14.5   --   13.9   PLT  237   --   236   NA  139  141  140   POTASSIUM  3.5  3.9  3.4   CR  0.73  0.76  0.68      IMPRESSION:                                                    Reason for surgery/procedure: nephrolithiasis   Diagnosis/reason for consult: pre-op evaluation     The proposed surgical procedure is considered INTERMEDIATE risk.    REVISED CARDIAC " RISK INDEX  The patient has the following serious cardiovascular risks for perioperative complications such as (MI, PE, VFib and 3  AV Block):  No serious cardiac risks  INTERPRETATION: 0 risks: Class I (very low risk - 0.4% complication rate)    The patient has the following additional risks for perioperative complications:  No identified additional risks      ICD-10-CM    1. Preop general physical exam Z01.818    2. Nephrolithiasis N20.0 HYDROcodone-acetaminophen (NORCO) 5-325 MG per tablet     CANCELED: UA with Microscopic reflex to Culture       RECOMMENDATIONS:                                                        --Patient is to take all scheduled medications on the day of surgery EXCEPT for modifications listed below.  -stop all NSAID's 5-7 days prior procedure     APPROVAL GIVEN to proceed with proposed procedure, without further diagnostic evaluation       Signed Electronically by: JUNG Mckeon CNP    Copy of this evaluation report is provided to requesting physician.    [facility] Preop Guidelines

## 2018-01-18 ENCOUNTER — CARE COORDINATION (OUTPATIENT)
Dept: UROLOGY | Facility: CLINIC | Age: 33
End: 2018-01-18

## 2018-01-18 DIAGNOSIS — R30.0 DYSURIA: Primary | ICD-10-CM

## 2018-01-18 RX ORDER — NITROFURANTOIN 25; 75 MG/1; MG/1
100 CAPSULE ORAL 2 TIMES DAILY
Qty: 10 CAPSULE | Refills: 0 | Status: SHIPPED | OUTPATIENT
Start: 2018-01-18 | End: 2018-01-23

## 2018-01-18 ASSESSMENT — ANXIETY QUESTIONNAIRES
7. FEELING AFRAID AS IF SOMETHING AWFUL MIGHT HAPPEN: SEVERAL DAYS
IF YOU CHECKED OFF ANY PROBLEMS ON THIS QUESTIONNAIRE, HOW DIFFICULT HAVE THESE PROBLEMS MADE IT FOR YOU TO DO YOUR WORK, TAKE CARE OF THINGS AT HOME, OR GET ALONG WITH OTHER PEOPLE: NOT DIFFICULT AT ALL
5. BEING SO RESTLESS THAT IT IS HARD TO SIT STILL: NOT AT ALL
GAD7 TOTAL SCORE: 2
2. NOT BEING ABLE TO STOP OR CONTROL WORRYING: NOT AT ALL
6. BECOMING EASILY ANNOYED OR IRRITABLE: NOT AT ALL
4. TROUBLE RELAXING: NOT AT ALL
3. WORRYING TOO MUCH ABOUT DIFFERENT THINGS: NOT AT ALL
1. FEELING NERVOUS, ANXIOUS, OR ON EDGE: SEVERAL DAYS

## 2018-01-18 ASSESSMENT — PATIENT HEALTH QUESTIONNAIRE - PHQ9: SUM OF ALL RESPONSES TO PHQ QUESTIONS 1-9: 2

## 2018-01-18 NOTE — PROGRESS NOTES
Patient informed to start taking Macrobid BID for 5 days. Patient verbalized understanding and agreed to plan. Lynn Law RN

## 2018-01-19 ASSESSMENT — ANXIETY QUESTIONNAIRES: GAD7 TOTAL SCORE: 2

## 2018-01-25 DIAGNOSIS — R30.0 DYSURIA: Primary | ICD-10-CM

## 2018-01-26 DIAGNOSIS — R30.0 DYSURIA: ICD-10-CM

## 2018-01-26 PROCEDURE — 87086 URINE CULTURE/COLONY COUNT: CPT | Performed by: UROLOGY

## 2018-01-27 LAB
BACTERIA SPEC CULT: NO GROWTH
Lab: NORMAL
SPECIMEN SOURCE: NORMAL

## 2018-01-31 ENCOUNTER — ANESTHESIA EVENT (OUTPATIENT)
Dept: SURGERY | Facility: AMBULATORY SURGERY CENTER | Age: 33
End: 2018-01-31

## 2018-02-01 ENCOUNTER — ANESTHESIA (OUTPATIENT)
Dept: SURGERY | Facility: AMBULATORY SURGERY CENTER | Age: 33
End: 2018-02-01

## 2018-02-01 ENCOUNTER — RADIANT APPOINTMENT (OUTPATIENT)
Dept: RADIOLOGY | Facility: AMBULATORY SURGERY CENTER | Age: 33
End: 2018-02-01
Attending: UROLOGY
Payer: COMMERCIAL

## 2018-02-01 ENCOUNTER — SURGERY (OUTPATIENT)
Age: 33
End: 2018-02-01

## 2018-02-01 ENCOUNTER — HOSPITAL ENCOUNTER (OUTPATIENT)
Facility: AMBULATORY SURGERY CENTER | Age: 33
End: 2018-02-01
Attending: UROLOGY
Payer: COMMERCIAL

## 2018-02-01 VITALS
HEIGHT: 65 IN | BODY MASS INDEX: 25.66 KG/M2 | SYSTOLIC BLOOD PRESSURE: 115 MMHG | RESPIRATION RATE: 16 BRPM | TEMPERATURE: 97.6 F | OXYGEN SATURATION: 100 % | WEIGHT: 154 LBS | DIASTOLIC BLOOD PRESSURE: 80 MMHG | HEART RATE: 61 BPM

## 2018-02-01 DIAGNOSIS — N20.0 KIDNEY STONES: ICD-10-CM

## 2018-02-01 DIAGNOSIS — N20.0 KIDNEY STONE: Primary | ICD-10-CM

## 2018-02-01 DEVICE — STENT URETERAL DBL PIGTAIL INLAY 6FRX24CM 778624: Type: IMPLANTABLE DEVICE | Site: URETER | Status: FUNCTIONAL

## 2018-02-01 RX ORDER — LIDOCAINE HYDROCHLORIDE 20 MG/ML
INJECTION, SOLUTION INFILTRATION; PERINEURAL PRN
Status: DISCONTINUED | OUTPATIENT
Start: 2018-02-01 | End: 2018-02-01

## 2018-02-01 RX ORDER — PROPOFOL 10 MG/ML
INJECTION, EMULSION INTRAVENOUS CONTINUOUS PRN
Status: DISCONTINUED | OUTPATIENT
Start: 2018-02-01 | End: 2018-02-01

## 2018-02-01 RX ORDER — LIDOCAINE 40 MG/G
CREAM TOPICAL
Status: DISCONTINUED | OUTPATIENT
Start: 2018-02-01 | End: 2018-02-01 | Stop reason: HOSPADM

## 2018-02-01 RX ORDER — DEXAMETHASONE SODIUM PHOSPHATE 4 MG/ML
INJECTION, SOLUTION INTRA-ARTICULAR; INTRALESIONAL; INTRAMUSCULAR; INTRAVENOUS; SOFT TISSUE PRN
Status: DISCONTINUED | OUTPATIENT
Start: 2018-02-01 | End: 2018-02-01

## 2018-02-01 RX ORDER — TAMSULOSIN HYDROCHLORIDE 0.4 MG/1
0.4 CAPSULE ORAL DAILY
Qty: 7 CAPSULE | Refills: 0 | Status: SHIPPED | OUTPATIENT
Start: 2018-02-01 | End: 2018-03-27

## 2018-02-01 RX ORDER — ONDANSETRON 2 MG/ML
4 INJECTION INTRAMUSCULAR; INTRAVENOUS EVERY 30 MIN PRN
Status: DISCONTINUED | OUTPATIENT
Start: 2018-02-01 | End: 2018-02-02 | Stop reason: HOSPADM

## 2018-02-01 RX ORDER — NALOXONE HYDROCHLORIDE 0.4 MG/ML
.1-.4 INJECTION, SOLUTION INTRAMUSCULAR; INTRAVENOUS; SUBCUTANEOUS
Status: DISCONTINUED | OUTPATIENT
Start: 2018-02-01 | End: 2018-02-02 | Stop reason: HOSPADM

## 2018-02-01 RX ORDER — ACETAMINOPHEN 325 MG/1
975 TABLET ORAL ONCE
Status: COMPLETED | OUTPATIENT
Start: 2018-02-01 | End: 2018-02-01

## 2018-02-01 RX ORDER — KETOROLAC TROMETHAMINE 30 MG/ML
INJECTION, SOLUTION INTRAMUSCULAR; INTRAVENOUS PRN
Status: DISCONTINUED | OUTPATIENT
Start: 2018-02-01 | End: 2018-02-01

## 2018-02-01 RX ORDER — ONDANSETRON 2 MG/ML
INJECTION INTRAMUSCULAR; INTRAVENOUS PRN
Status: DISCONTINUED | OUTPATIENT
Start: 2018-02-01 | End: 2018-02-01

## 2018-02-01 RX ORDER — SODIUM CHLORIDE, SODIUM LACTATE, POTASSIUM CHLORIDE, CALCIUM CHLORIDE 600; 310; 30; 20 MG/100ML; MG/100ML; MG/100ML; MG/100ML
INJECTION, SOLUTION INTRAVENOUS CONTINUOUS
Status: DISCONTINUED | OUTPATIENT
Start: 2018-02-01 | End: 2018-02-02 | Stop reason: HOSPADM

## 2018-02-01 RX ORDER — HYDROCODONE BITARTRATE AND ACETAMINOPHEN 5; 325 MG/1; MG/1
1-2 TABLET ORAL EVERY 4 HOURS PRN
Qty: 15 TABLET | Refills: 0 | Status: SHIPPED | OUTPATIENT
Start: 2018-02-01 | End: 2018-02-01

## 2018-02-01 RX ORDER — NITROFURANTOIN 25; 75 MG/1; MG/1
100 CAPSULE ORAL 2 TIMES DAILY
Qty: 12 CAPSULE | Refills: 0 | Status: SHIPPED | OUTPATIENT
Start: 2018-02-01 | End: 2018-03-27

## 2018-02-01 RX ORDER — GABAPENTIN 300 MG/1
300 CAPSULE ORAL ONCE
Status: COMPLETED | OUTPATIENT
Start: 2018-02-01 | End: 2018-02-01

## 2018-02-01 RX ORDER — OXYCODONE HYDROCHLORIDE 5 MG/1
5 TABLET ORAL EVERY 4 HOURS PRN
Qty: 15 TABLET | Refills: 0 | Status: SHIPPED | OUTPATIENT
Start: 2018-02-01 | End: 2018-03-27

## 2018-02-01 RX ORDER — SODIUM CHLORIDE, SODIUM LACTATE, POTASSIUM CHLORIDE, CALCIUM CHLORIDE 600; 310; 30; 20 MG/100ML; MG/100ML; MG/100ML; MG/100ML
INJECTION, SOLUTION INTRAVENOUS CONTINUOUS
Status: DISCONTINUED | OUTPATIENT
Start: 2018-02-01 | End: 2018-02-01 | Stop reason: HOSPADM

## 2018-02-01 RX ORDER — FENTANYL CITRATE 50 UG/ML
25-50 INJECTION, SOLUTION INTRAMUSCULAR; INTRAVENOUS
Status: DISCONTINUED | OUTPATIENT
Start: 2018-02-01 | End: 2018-02-01 | Stop reason: HOSPADM

## 2018-02-01 RX ORDER — MEPERIDINE HYDROCHLORIDE 25 MG/ML
12.5 INJECTION INTRAMUSCULAR; INTRAVENOUS; SUBCUTANEOUS
Status: DISCONTINUED | OUTPATIENT
Start: 2018-02-01 | End: 2018-02-02 | Stop reason: HOSPADM

## 2018-02-01 RX ORDER — OXYBUTYNIN CHLORIDE 5 MG/1
5 TABLET ORAL 2 TIMES DAILY PRN
Qty: 20 TABLET | Refills: 0 | Status: SHIPPED | OUTPATIENT
Start: 2018-02-01 | End: 2018-03-27

## 2018-02-01 RX ORDER — PROPOFOL 10 MG/ML
INJECTION, EMULSION INTRAVENOUS PRN
Status: DISCONTINUED | OUTPATIENT
Start: 2018-02-01 | End: 2018-02-01

## 2018-02-01 RX ORDER — ONDANSETRON 4 MG/1
4 TABLET, ORALLY DISINTEGRATING ORAL EVERY 30 MIN PRN
Status: DISCONTINUED | OUTPATIENT
Start: 2018-02-01 | End: 2018-02-02 | Stop reason: HOSPADM

## 2018-02-01 RX ORDER — FENTANYL CITRATE 50 UG/ML
INJECTION, SOLUTION INTRAMUSCULAR; INTRAVENOUS PRN
Status: DISCONTINUED | OUTPATIENT
Start: 2018-02-01 | End: 2018-02-01

## 2018-02-01 RX ADMIN — PROPOFOL 150 MCG/KG/MIN: 10 INJECTION, EMULSION INTRAVENOUS at 11:00

## 2018-02-01 RX ADMIN — FENTANYL CITRATE 50 MCG: 50 INJECTION, SOLUTION INTRAMUSCULAR; INTRAVENOUS at 11:05

## 2018-02-01 RX ADMIN — DEXAMETHASONE SODIUM PHOSPHATE 4 MG: 4 INJECTION, SOLUTION INTRA-ARTICULAR; INTRALESIONAL; INTRAMUSCULAR; INTRAVENOUS; SOFT TISSUE at 11:04

## 2018-02-01 RX ADMIN — ONDANSETRON 4 MG: 2 INJECTION INTRAMUSCULAR; INTRAVENOUS at 11:04

## 2018-02-01 RX ADMIN — FENTANYL CITRATE 50 MCG: 50 INJECTION, SOLUTION INTRAMUSCULAR; INTRAVENOUS at 11:15

## 2018-02-01 RX ADMIN — PROPOFOL 200 MG: 10 INJECTION, EMULSION INTRAVENOUS at 11:05

## 2018-02-01 RX ADMIN — ACETAMINOPHEN 975 MG: 325 TABLET ORAL at 09:37

## 2018-02-01 RX ADMIN — LIDOCAINE HYDROCHLORIDE 100 MG: 20 INJECTION, SOLUTION INFILTRATION; PERINEURAL at 11:05

## 2018-02-01 RX ADMIN — KETOROLAC TROMETHAMINE 30 MG: 30 INJECTION, SOLUTION INTRAMUSCULAR; INTRAVENOUS at 11:40

## 2018-02-01 RX ADMIN — SODIUM CHLORIDE, SODIUM LACTATE, POTASSIUM CHLORIDE, CALCIUM CHLORIDE: 600; 310; 30; 20 INJECTION, SOLUTION INTRAVENOUS at 09:46

## 2018-02-01 RX ADMIN — GABAPENTIN 300 MG: 300 CAPSULE ORAL at 09:37

## 2018-02-01 ASSESSMENT — LIFESTYLE VARIABLES: TOBACCO_USE: 1

## 2018-02-01 NOTE — ANESTHESIA PREPROCEDURE EVALUATION
Anesthesia Evaluation     . Pt has had prior anesthetic. Type: MAC    No history of anesthetic complications          ROS/MED HX    ENT/Pulmonary:     (+)tobacco use, Past use , . .    Neurologic:  - neg neurologic ROS     Cardiovascular:     (+) ----. : . . . :. . No previous cardiac testing       METS/Exercise Tolerance:  >4 METS   Hematologic:  - neg hematologic  ROS       Musculoskeletal:  - neg musculoskeletal ROS       GI/Hepatic:  - neg GI/hepatic ROS       Renal/Genitourinary:     (+) Nephrolithiasis ,       Endo:  - neg endo ROS       Psychiatric:     (+) psychiatric history anxiety      Infectious Disease:  - neg infectious disease ROS       Malignancy:         Other:                     Physical Exam  Normal systems: dental    Airway   Mallampati: I  TM distance: >3 FB  Neck ROM: full    Dental     Cardiovascular   Rhythm and rate: regular and normal      Pulmonary    breath sounds clear to auscultation        Procedure: Procedure(s):  Cystoscopy, Right Ureteroscopy, Laser Lithotripsy, Stent Placement - Wound Class: II-Clean Contaminated    HPI: Grace Dominguez is a 32 year old female who is presenting for above stated procedure.    PMHx/PSHx/ROS:  Past Medical History:   Diagnosis Date     Abnormal Pap smear of cervix age 25    colposcopy normal and paps normal since     Chickenpox      History of blood transfusion 2005    After first      History of blood transfusion reaction     with her      History of colposcopy with cervical biopsy 16    visually normal, no bx taken, pt pregnant     Papanicolaou smear of cervix with low grade squamous intraepithelial lesion (LGSIL) 4/15/16    + HR HPV     PUPP (pruritic urticarial papules and plaques of pregnancy)        Past Surgical History:   Procedure Laterality Date     BIOPSY  10/17/2016    Skin biopsy due to terrible rash     C/SECTION, LOW TRANSVERSE      , Low Transverse      SECTION, TUBAL LIGATION, COMBINED  N/A 10/13/2016    Procedure: COMBINED  SECTION, TUBAL LIGATION;  Surgeon: Nicole Pitts MD;  Location: [facility]     ESOPHAGOSCOPY, GASTROSCOPY, DUODENOSCOPY (EGD), COMBINED N/A 2017    Procedure: COMBINED ESOPHAGOSCOPY, GASTROSCOPY, DUODENOSCOPY (EGD);  gastroscopy;  Surgeon: Milton Haines MD;  Location: [facility]     MOUTH SURGERY      wisdom teeth     MOUTH SURGERY      as child       ROS as stated above    Soc Hx:   Social History   Substance Use Topics     Smoking status: Former Smoker     Quit date: 2015     Smokeless tobacco: Never Used     Alcohol use No      Comment: 2 drinks weekly- quit with pregnancy       Allergies: No Known Allergies    Meds:     (Not in a hospital admission)    Current Outpatient Prescriptions   Medication Sig Dispense Refill     HYDROcodone-acetaminophen (NORCO) 5-325 MG per tablet Take 1-2 tablets by mouth every 4 hours as needed for moderate to severe pain 15 tablet 0     phenazopyridine (AZO) 97.5 MG tablet Take 2 tablets (195 mg) by mouth 3 times daily 12 tablet 0     oxybutynin (DITROPAN) 5 MG tablet Take 1 tablet (5 mg) by mouth 2 times daily as needed for bladder spasms 20 tablet 0     cyclobenzaprine (FLEXERIL) 10 MG tablet Take 0.5-1 tablets (5-10 mg) by mouth 3 times daily as needed for muscle spasms 60 tablet 3     diclofenac (VOLTAREN) 50 MG EC tablet Take 1 tablet (50 mg) by mouth 3 times daily as needed for moderate pain 60 tablet 3     Omeprazole (PRILOSEC PO) Take 10 mg by mouth daily as needed          Physical Exam:  VS: Temp:  [36.7  C (98.1  F)] 36.7  C (98.1  F)  Resp:  [16] 16  BP: (113)/(75) 113/75  SpO2:  [97 %] 97 %   97%, Weight   Wt Readings from Last 2 Encounters:   18 69.9 kg (154 lb)   18 69.9 kg (154 lb)       Labs:    BMP:  Recent Labs   Lab Test  18   0828   NA  139   POTASSIUM  3.5   CHLORIDE  104   CO2  28   BUN  9   CR  0.73   GLC  92   MEG  8.8     LFTs:   Recent Labs   Lab Test  17   1900    PROTTOTAL  7.6   ALBUMIN  4.3   BILITOTAL  0.4   ALKPHOS  58   AST  11   ALT  22     CBC:   Recent Labs   Lab Test  01/16/18   0828   WBC  7.3   RBC  5.01   HGB  14.5   HCT  44.3   MCV  88   MCH  28.9   MCHC  32.7   RDW  11.9   PLT  237                 Anesthesia Plan      History & Physical Review  History and physical reviewed and following examination; no interval change.    ASA Status:  2 .    NPO Status:  > 6 hours    Plan for General and LMA with Intravenous and Propofol induction. Maintenance will be TIVA.    PONV prophylaxis:  Ondansetron (or other 5HT-3) and Dexamethasone or Solumedrol       Postoperative Care  Postoperative pain management:  Oral pain medications and Multi-modal analgesia.      Consents  Anesthetic plan, risks, benefits and alternatives discussed with:  Patient.  Use of blood products discussed: No .   .        I have personally discussed the risks and benefits of anesthesia with the patient and patient agrees to proceed.    Atul Dan MD  Anesthesiology                    .

## 2018-02-01 NOTE — BRIEF OP NOTE
[facility]    Brief Operative Note    Pre-operative diagnosis: Renal Stone  Post-operative diagnosis * No post-op diagnosis entered *  Procedure: Procedure(s):  Cystoscopy, Right Ureteroscopy, Laser Lithotripsy, Stent Placement - Wound Class: II-Clean Contaminated  Surgeon: Surgeon(s) and Role:     * Kaiden Jefferson MD - Primary  Anesthesia: General   Estimated blood loss: Minimal  Drains:  6 Fr x 26 cm stent   Specimens:   ID Type Source Tests Collected by Time Destination   1 : Right kidney stone Calculus/Stone Kidney, Right STONE ANALYSIS Kaiden Jefferson MD 2/1/2018 11:51 AM      Findings:   Enterotomy, serosal tear, dural tear, or laceration secondary to the nature of the procedure, that was recognized and repaired and Zuhair Plaques .  Complications: None.  Implants: None.

## 2018-02-01 NOTE — OP NOTE
OPERATIVE REPORT    PREOPERATIVE DIAGNOSIS:  Right Renal Stone    POSTOPERATIVE DIAGNOSIS:  Same    PROCEDURES PERFORMED:   1. Cystourethroscopy  2. Right ureteroscopy  3. Right retrograde pyelogram with interpretation of intraoperative fluoroscopic imaging  4. Holmium laser lithotripsy with basket stone extraction, right  5. Right ureteral stent placement    STAFF SURGEON: Kaiden Jefferson, present and participatory for the entire case.   RESIDENT(S): Phi Hayes MD  ANESTHESIA: General    ESTIMATED BLOOD LOSS: <5 cc  DRAINS/TUBES: 6 Mauritanian x 24 cm double-J ureteral stent on string    IV FLUIDS: Please see dictated anesthesia record  COMPLICATIONS: None.   SPECIMEN: Right renal stone for analysis  SIGNIFICANT FINDINGS: Right renal stone, randalls plaque    BRIEF OPERATIVE INDICATIONS:  Grace Dominguez is a(n) 32 year old female who presented with right flank pain in the setting of a 5 mm renal stone  .  After a discussion of all risks, benefits, and alternatives, the patient elected to proceed with definitive stone management with a ureteroscopic approach.    After induction of general anesthesia the patient was repositioned in dorsal lithotomy and prepped and draped in the standard sterile fashion.  A time out was performed confirming the appropriate patient identity and planned procedure.  The patient received culture directed antibiotics and had bilateral sequential compression devices for DVT propylaxis.    A 22-Mauritanian rigid cystoscope was inserted into a well-lubricated urethra.  The bladder was normal, there was no cystitis, ureteral orifices were orthotopic.  A sensor wire was passed up the right ureter and into the kidney,    Flexible URS   A 36 cm 11/13 ureteral access sheath was advanced up to the right proximal ureter.  It passed with no resistance.  The flexible ureteroscope was used to identify the stone. A 200 micron laser fiber was used at a setting of 0.6 J and 6 Hz and lithotripsy was performed. A  tipless nitinol stone basket was used to remove all fragments greater than 1 mm.  There was moderate plaque on other papillae with some overgrowth stones that were basket extracted as well. Pullback ureteroscopy was performed and showed no retained stone fragments or ureteral injury.  Retrograde pyelography revealed mild right hydronephrosis without extravasation.    A 6Fr x 24-cm double-J stent was advanced over the Sensor wire, and a good proximal curl was seen in the renal pelvis on fluoroscopy and the distal curl was seen in the bladder. The bladder was drained.    The patient tolerated the procedure well and there were no apparent complications. The patient  was transported to the postanesthesia care unit in stable condition.     POSTOP PLAN:  -Stent removal via string in 5 days    Kaiden Jefferson

## 2018-02-01 NOTE — IP AVS SNAPSHOT
MRN:[id number]                      After Visit Summary   2/1/2018    Grace Dominguez    MRN: [id number]           Thank you!     Thank you for choosing [facility] for your care. Our goal is always to provide you with excellent care. Hearing back from our patients is one way we can continue to improve our services. Please take a few minutes to complete the written survey that you may receive in the mail after you visit with us. Thank you!        Patient Information     Date Of Birth          1985        About your hospital stay     You were admitted on:  February 1, 2018 [facility]    You were discharged on:  February 1, 2018       Who to Call     For medical emergencies, please call 911.  For non-urgent questions about your medical care, please call your primary care provider or clinic, [phone]  For questions related to your surgery, please call your surgery clinic        Attending Provider     Provider Specialty    Kaiden Jefferson MD Urology       Primary Care Provider Office Phone # Fax #    Esther Jones JUNG Pearson [phone]      After Care Instructions     Discharge Instructions       Activity  - Do not strain with bowel movements.  - Do not drive until you can press the brake pedal quickly and fully without pain.   - Do not operate a motor vehicle while taking narcotic pain medications.     Stents  - You have a ureteral stent in place. Stents can cause some discomfort, including some blood in your urine (which is normal), the feeling or urge to urinate frequently, burning with urination and pressure/discomfort in your back while voiding. Stay well hydrated to keep your urine as clear as possible.  - take the stent out next Tuesday by pulling on the string    Medications  - Flomax - to decrease stent discomfort   - Transition from narcotic pain medications to tylenol (acetaminophen) as you are able.   Wean yourself off all pain medications as you are able.  - Some pain medications contain both tylenol (acetaminophen) and a narcotic (Norco, vicodin, percocet), do not take more than 4,000mg of Tylenol (acetaminophen) from all sources in any 24 hour period.  - Narcotics can make you constipated.  Take over the counter fiber (metamucil or benefiber) and stool softeners (miralax, docusate or senna) while taking narcotic pain medications, but stop if you develop diarrhea.  - No driving or operating machinery while taking narcotic pain medications                  Further instructions from your care team       [facility]  Home Care Following Anesthesia  For 24 hours after surgery:  1. Get plenty of rest.  A responsible adult must stay with you for at least 24 hours after you leave the surgery center.  2. Do not drive or use heavy equipment.  If you have weakness or tingling, don't drive or use heavy equipment until this feeling goes away.   3. Do not drink alcohol.   4. Avoid strenuous or risky activities.  Ask for help when climbing stairs.  5. You may feel lightheaded.  IF so, sit for a few minutes before standing.  Have someone help you get up.   6. If you have nausea (feel sick to your stomach): Drink only clear liquids such as apple juice, ginger ale, broth or 7-Up.  Rest may also help.  Be sure to drink enough fluids.  Move to a regular diet as you feel able.   7. You may have a slight fever.  Call the doctor if your fever is over 100 F (37.7 C) (taken under the tongue) or lasts longer than 24 hours.  8. You may have a dry mouth, a sore throat, muscle aches or trouble sleeping. These should go away after 24 hours.  9. Do not make important or legal decisions.   Tips for taking pain medications  To get the best pain relief possible, remember these points:    Take pain medications as directed, before pain becomes severe.    Pain medication can upset your stomach: taking it with  food may help.    Constipation is a common side effect of pain medication. Drink plenty of  fluids.    Eat foods high in fiber. Take a stool softener if recommended by your doctor or pharmacist.    Do not drink alcohol, drive or operate machinery while taking pain medications.    Ask about other ways to control pain, such as with heat, ice or relaxation.    Tylenol/Acetaminophen Consumption  Last dose: 975 mg at 9:30 AM  To help encourage the safe use of acetaminophen, the makers of TYLENOL  have lowered the maximum daily dose for single-ingredient Extra Strength TYLENOL  (acetaminophen) products sold in the [place] from 8 pills per day (4,000 mg) to 6 pills per day (3,000 mg). The dosing interval has also changed from 2 pills every 4-6 hours to 2 pills every 6 hours.    If you feel your pain relief is insufficient, you may take Tylenol/Acetaminophen in addition to your narcotic pain medication.     Be careful not to exceed 3,000 mg of Tylenol/Acetaminophen in a 24 hour period from all sources.    If you are taking extra strength Tylenol/acetaminophen (500 mg), the maximum dose is 6 tablets in 24 hours.    If you are taking regular strength acetaminophen (325 mg), the maximum dose is 9 tablets in 24 hours.    Call a doctor for any of the followin. Signs of infection (fever, growing tenderness at the surgery site, a large amount of drainage or bleeding, severe pain, foul-smelling drainage, redness, swelling).    Your doctor is:  Dr. Kaiden Jefferson, Prostate and Urology: [phone]                  Or dial [phone] and ask for the resident on call for:  Prostate Urology  For emergency care, call the:  [place]:  [phone] (TTY for hearing impaired: [phone])    Pending Results     No orders found from 2018 to 2018.            Admission Information     Date & Time Provider Department Dept. Phone    2018 Kaiden Jefferson MD [facility]  "[phone]      Your Vitals Were     Blood Pressure Pulse Temperature Respirations Height Weight    110/75 76 97.6  F (36.4  C) (Temporal) 12 1.651 m (5' 5\") 69.9 kg (154 lb)    Pulse Oximetry BMI (Body Mass Index)                100% 25.63 kg/m2          [facility] Information     [facility] gives you secure access to your electronic health record. If you see a primary care provider, you can also send messages to your care team and make appointments. If you have questions, please call your primary care clinic.  If you do not have a primary care provider, please call [phone] and they will assist you.      [facility] is an electronic gateway that provides easy, online access to your medical records. With [facility], you can request a clinic appointment, read your test results, renew a prescription or communicate with your care team.     To access your existing account, please contact your [facility] or call [phone] for assistance.        Care EveryWhere ID     This is your Care EveryWhere ID. This could be used by other organizations to access your [facility] medical records  [id number]        Equal Access to Services     LILIANA TREVIZO : Hadflorina Arriaga, howard fabian, nico santos, cherelle gupta. So [facility] [phone].    ATENCIÓN: Si habla español, tiene a awad disposición servicios gratuitos de asistencia lingüística. Llame al [phone].    We comply with applicable federal civil rights laws and [place] laws. We do not discriminate on the basis of race, color, national origin, age, disability, sex, sexual orientation, or gender identity.               Review of your medicines      START taking        Dose / Directions    nitroFURantoin (macrocrystal-monohydrate) 100 MG capsule   Commonly known as:  MACROBID   Used for:  Kidney stone        Dose:  100 mg   Take 1 capsule (100 mg) by mouth 2 times daily   Quantity:  12 capsule "   Refills:  0       oxybutynin 5 MG tablet   Commonly known as:  DITROPAN   Used for:  Kidney stone        Dose:  5 mg   Take 1 tablet (5 mg) by mouth 2 times daily as needed for bladder spasms   Quantity:  20 tablet   Refills:  0       oxyCODONE IR 5 MG tablet   Commonly known as:  ROXICODONE   Used for:  Kidney stone        Dose:  5 mg   Take 1 tablet (5 mg) by mouth every 4 hours as needed for pain   Quantity:  15 tablet   Refills:  0       phenazopyridine 97.5 MG tablet   Commonly known as:  AZO   Used for:  Kidney stone        Dose:  195 mg   Take 2 tablets (195 mg) by mouth 3 times daily   Quantity:  12 tablet   Refills:  0       tamsulosin 0.4 MG capsule   Commonly known as:  FLOMAX   Used for:  Kidney stone        Dose:  0.4 mg   Take 1 capsule (0.4 mg) by mouth daily   Quantity:  7 capsule   Refills:  0         CONTINUE these medicines which have NOT CHANGED        Dose / Directions    cyclobenzaprine 10 MG tablet   Commonly known as:  FLEXERIL   Used for:  Bilateral thoracic back pain, unspecified chronicity        Dose:  5-10 mg   Take 0.5-1 tablets (5-10 mg) by mouth 3 times daily as needed for muscle spasms   Quantity:  60 tablet   Refills:  3       diclofenac 50 MG EC tablet   Commonly known as:  VOLTAREN   Used for:  Bilateral thoracic back pain, unspecified chronicity        Dose:  50 mg   Take 1 tablet (50 mg) by mouth 3 times daily as needed for moderate pain   Quantity:  60 tablet   Refills:  3       PRILOSEC PO        Dose:  10 mg   Take 10 mg by mouth daily as needed   Refills:  0         STOP taking     HYDROcodone-acetaminophen 5-325 MG per tablet   Commonly known as:  NORCO                Where to get your medicines      These medications were sent to [place], [place] - [address] [facility] [address] [place] [address] [address] [place] [id number]    Hours:  TRANSPLANT PHONE NUMBER [phone] Phone:  [phone]     nitroFURantoin  (macrocrystal-monohydrate) 100 MG capsule    oxybutynin 5 MG tablet    phenazopyridine 97.5 MG tablet    tamsulosin 0.4 MG capsule         Some of these will need a paper prescription and others can be bought over the counter. Ask your nurse if you have questions.     Bring a paper prescription for each of these medications     oxyCODONE IR 5 MG tablet                Protect others around you: Learn how to safely use, store and throw away your medicines at www.disposemymeds.org.        Information about OPIOIDS     PRESCRIPTION OPIOIDS: WHAT YOU NEED TO KNOW    Prescription opioids can be used to help relieve moderate to severe pain and are often prescribed following a surgery or injury, or for certain health conditions. These medications can be an important part of treatment but also come with serious risks. It is important to work with your health care provider to make sure you are getting the safest, most effective care.    WHAT ARE THE RISKS AND SIDE EFFECTS OF OPIOID USE?  Prescription opioids carry serious risks of addiction and overdose, especially with prolonged use. An opioid overdose, often marked by slowed breathing can cause sudden death. The use of prescription opioids can have a number of side effects as well, even when taken as directed:      Tolerance - meaning you might need to take more of a medication for the same pain relief    Physical dependence - meaning you have symptoms of withdrawal when a medication is stopped    Increased sensitivity to pain    Constipation    Nausea, vomiting, and dry mouth    Sleepiness and dizziness    Confusion    Depression    Low levels of testosterone that can result in lower sex drive, energy, and strength    Itching and sweating    RISKS ARE GREATER WITH:    History of drug misuse, substance use disorder, or overdose    Mental health conditions (such as depression or anxiety)    Sleep apnea    Older age (65 years or older)    Pregnancy    Avoid alcohol while  taking prescription opioids.   Also, unless specifically advised by your health care provider, medications to avoid include:    Benzodiazepines (such as Xanax or Valium)    Muscle relaxants (such as Soma or Flexeril)    Hypnotics (such as Ambien or Lunesta)    Other prescription opioids    KNOW YOUR OPTIONS:  Talk to your health care provider about ways to manage your pain that do not involve prescription opioids. Some of these options may actually work better and have fewer risks and side effects:    Pain relievers such as acetaminophen, ibuprofen, and naproxen    Some medications that are also used for depression or seizures    Physical therapy and exercise    Cognitive behavioral therapy, a psychological, goal-directed approach, in which patients learn how to modify physical, behavioral, and emotional triggers of pain and stress    IF YOU ARE PRESCRIBED OPIOIDS FOR PAIN:    Never take opioids in greater amounts or more often than prescribed    Follow up with your primary health care provider and work together to create a plan on how to manage your pain.    Talk about ways to help manage your pain that do not involve prescription opioids    Talk about all concerns and side effects    Help prevent misuse and abuse    Never sell or share prescription opioids    Never use another person's prescription opioids    Store prescription opioids in a secure place and out of reach of others (this may include visitors, children, friends, and family)    Visit www.cdc.gov/drugoverdose to learn about risks of opioid abuse and overdose    If you believe you may be struggling with addiction, tell your health care provider and ask for guidance or call [facility]'s National Helpline at 0-617-379-HELP    LEARN MORE / www.cdc.gov/drugoverdose/prescribing/guideline.html    Safely dispose of unused prescription opioids: Find your local drug take-back programs and more information about the importance of safe disposal at  www.doseofreality.[place].gov             Medication List: This is a list of all your medications and when to take them. Check marks below indicate your daily home schedule. Keep this list as a reference.      Medications           Morning Afternoon Evening Bedtime As Needed    cyclobenzaprine 10 MG tablet   Commonly known as:  FLEXERIL   Take 0.5-1 tablets (5-10 mg) by mouth 3 times daily as needed for muscle spasms                                diclofenac 50 MG EC tablet   Commonly known as:  VOLTAREN   Take 1 tablet (50 mg) by mouth 3 times daily as needed for moderate pain                                nitroFURantoin (macrocrystal-monohydrate) 100 MG capsule   Commonly known as:  MACROBID   Take 1 capsule (100 mg) by mouth 2 times daily                                oxybutynin 5 MG tablet   Commonly known as:  DITROPAN   Take 1 tablet (5 mg) by mouth 2 times daily as needed for bladder spasms                                oxyCODONE IR 5 MG tablet   Commonly known as:  ROXICODONE   Take 1 tablet (5 mg) by mouth every 4 hours as needed for pain                                phenazopyridine 97.5 MG tablet   Commonly known as:  AZO   Take 2 tablets (195 mg) by mouth 3 times daily                                PRILOSEC PO   Take 10 mg by mouth daily as needed                                tamsulosin 0.4 MG capsule   Commonly known as:  FLOMAX   Take 1 capsule (0.4 mg) by mouth daily

## 2018-02-01 NOTE — DISCHARGE INSTRUCTIONS
[facility]  Home Care Following Anesthesia  For 24 hours after surgery:  1. Get plenty of rest.  A responsible adult must stay with you for at least 24 hours after you leave the surgery center.  2. Do not drive or use heavy equipment.  If you have weakness or tingling, don't drive or use heavy equipment until this feeling goes away.   3. Do not drink alcohol.   4. Avoid strenuous or risky activities.  Ask for help when climbing stairs.  5. You may feel lightheaded.  IF so, sit for a few minutes before standing.  Have someone help you get up.   6. If you have nausea (feel sick to your stomach): Drink only clear liquids such as apple juice, ginger ale, broth or 7-Up.  Rest may also help.  Be sure to drink enough fluids.  Move to a regular diet as you feel able.   7. You may have a slight fever.  Call the doctor if your fever is over 100 F (37.7 C) (taken under the tongue) or lasts longer than 24 hours.  8. You may have a dry mouth, a sore throat, muscle aches or trouble sleeping. These should go away after 24 hours.  9. Do not make important or legal decisions.   Tips for taking pain medications  To get the best pain relief possible, remember these points:    Take pain medications as directed, before pain becomes severe.    Pain medication can upset your stomach: taking it with food may help.    Constipation is a common side effect of pain medication. Drink plenty of  fluids.    Eat foods high in fiber. Take a stool softener if recommended by your doctor or pharmacist.    Do not drink alcohol, drive or operate machinery while taking pain medications.    Ask about other ways to control pain, such as with heat, ice or relaxation.    Tylenol/Acetaminophen Consumption  Last dose: 975 mg at 9:30 AM  To help encourage the safe use of acetaminophen, the makers of TYLENOL  have lowered the maximum daily dose for single-ingredient Extra Strength TYLENOL  (acetaminophen) products sold in  the [place] from 8 pills per day (4,000 mg) to 6 pills per day (3,000 mg). The dosing interval has also changed from 2 pills every 4-6 hours to 2 pills every 6 hours.    If you feel your pain relief is insufficient, you may take Tylenol/Acetaminophen in addition to your narcotic pain medication.     Be careful not to exceed 3,000 mg of Tylenol/Acetaminophen in a 24 hour period from all sources.    If you are taking extra strength Tylenol/acetaminophen (500 mg), the maximum dose is 6 tablets in 24 hours.    If you are taking regular strength acetaminophen (325 mg), the maximum dose is 9 tablets in 24 hours.    Call a doctor for any of the followin. Signs of infection (fever, growing tenderness at the surgery site, a large amount of drainage or bleeding, severe pain, foul-smelling drainage, redness, swelling).    Your doctor is:  Dr. Kaiden Jefferson, Prostate and Urology: [phone]                  Or dial [phone] and ask for the resident on call for:  Prostate Urology  For emergency care, call the:  [facility]:  [phone] (TTY for hearing impaired: [phone])

## 2018-02-01 NOTE — ANESTHESIA CARE TRANSFER NOTE
Patient: Grace Dominguez    Procedure(s):  Cystoscopy, Right Ureteroscopy, Laser Lithotripsy, Stent Placement - Wound Class: II-Clean Contaminated    Diagnosis: Renal Stone  Diagnosis Additional Information: No value filed.    Anesthesia Type:   General, LMA     Note:  Airway :Face Mask  Patient transferred to:PACU  Comments: To PACU pt. Alert O2 via face mask @ 8 liters. Report given to RNHandoff Report: Identifed the Patient, Identified the Reponsible Provider, Reviewed the pertinent medical history, Discussed the surgical course, Reviewed Intra-OP anesthesia mangement and issues during anesthesia, Set expectations for post-procedure period and Allowed opportunity for questions and acknowledgement of understanding      Vitals: (Last set prior to Anesthesia Care Transfer)    CRNA VITALS  2/1/2018 1124 - 2/1/2018 1157      2/1/2018             Pulse: 75    SpO2: 99 %    Resp Rate (observed): 14                Electronically Signed By: JUNG Rick CRNA  February 1, 2018  11:57 AM

## 2018-02-01 NOTE — IP AVS SNAPSHOT
[facility]    [address] [place] [id number]    Phone:  [phone]    Fax:  [phone]                                       After Visit Summary   2/1/2018    Grace Dominguez    MRN: [id number]           After Visit Summary Signature Page     I have received my discharge instructions, and my questions have been answered. I have discussed any challenges I see with this plan with the nurse or doctor.    ..........................................................................................................................................  Patient/Patient Representative Signature      ..........................................................................................................................................  Patient Representative Print Name and Relationship to Patient    ..................................................               ................................................  Date                                            Time    ..........................................................................................................................................  Reviewed by Signature/Title    ...................................................              ..............................................  Date                                                            Time

## 2018-02-05 LAB
APPEARANCE STONE: NORMAL
COMPN STONE: NORMAL
NUMBER STONE: 3
SIZE STONE: NORMAL MM
WT STONE: 8 MG

## 2018-02-07 DIAGNOSIS — N20.0 CALCULUS OF KIDNEY: Primary | ICD-10-CM

## 2018-02-15 ENCOUNTER — PRE VISIT (OUTPATIENT)
Dept: UROLOGY | Facility: CLINIC | Age: 33
End: 2018-02-15

## 2018-02-20 NOTE — PROGRESS NOTES
SUBJECTIVE:                                                    Grace Dominguez is a 32 year old female who presents to clinic today for the following health issues: Abdominal pain, bloating, and frequent loose stools. Grace reported of having more than 10 loose stools a day for the past 5-7 days which started as loose stool and turned into completely watery diarrhea. The loose stools have stopped and patient did not have any bowel movements since yesterday. Patient also noted some bright blood in the stool but thinks it is due to external hemmroids aggravated with frequent loose stools. Patient  reported of right upper quadrant tenderness and numbness. Patient also mentioned of feeling bloated, nausea, after eating a full meal but not such much with eating smaller meals. The nausea has been ongoing since the delivery of twins in October 2017. Patient has history of GERD and narrowing of esophagus per patient but denies of any symptoms of heartburn. Pateint also denies of fever, chills, vomiting, dysuria, and hematuria.       Diarrhea  Onset: a week    Description:   Consistency of stool: runny, loose and yellow  Blood in stool: YES-bumps on rectum   Number of loose stools in past 24 hours: 3    Progression of Symptoms:  worsening    Accompanying Signs & Symptoms:  Fever: no   Nausea or vomiting; YES- nausea  Abdominal pain: YES  Episodes of constipation: YES- two day period  Weight loss: no     History:   Ill contacts: no   Recent use of antibiotics: no    Recent travels: no          Recent medication-new or changes(Rx or OTC): no     Precipitating factors:   none    Alleviating factors:   none    Therapies Tried and outcome:  none        Problem list and histories reviewed & adjusted, as indicated.  Additional history: as documented    Labs reviewed in EPIC    Reviewed and updated as needed this visit by clinical staffTobao  Allergies  Meds       Reviewed and updated as needed this visit by Provider          ROS:  Constitutional, HEENT, cardiovascular, pulmonary, gi and gu systems are negative, except as otherwise noted.      OBJECTIVE:   /78 (BP Location: Right arm, Patient Position: Chair, Cuff Size: Adult Regular)  Pulse 92  Temp 98  F (36.7  C) (Tympanic)  Wt 155 lb 6.4 oz (70.5 kg)  LMP 07/19/2017 (Approximate)  BMI 26.67 kg/m2  Body mass index is 26.67 kg/(m^2).  GENERAL: healthy, alert and no distress  ABDOMEN: epigastric, RUQ and RLQ tenderness.  Abdomen without hepatosplenomegaly or masses, and bowel sounds normal  BACK: no CVA tenderness, no paralumbar tenderness  PSYCH: mentation appears normal, affect normal/bright    Diagnostic Test Results:  Liver function test  WBC  Lipase  Enteric bacteria & virus Panel  Clostridium Difficile   Abdominal Ultrasound      ASSESSMENT/PLAN:     1. RUQ abdominal pain  Possible gallstones, cholecystitis . No family history of gastrointestinal diseases, reported personal history of GERD   - Lipase  - Hepatic panel  - WBC count  -  US Abdomen Limited; Future    2. Epigastric pain  Possible gallstones vs gastritis and GERD. No family history of gastrointestinal diseases  - Lipase  - Hepatic panel  - WBC count  - US Abdomen Limited; Future  - Prilosec 20 mg daily morning before meal     3. Diarrhea, unspecified type  Rule out infectious etiology  - Clostridium difficile toxin B PCR; Future  - Enteric Bacteria and Virus Panel by LAURO Stool; Future    4. External hemorrhoids  History of external hemorrhoids   - hydrocortisone (ANUSOL-HC) 2.5 % cream; Place rectally 2 times daily  Dispense: 30 g; Refill: 1  - Increase fiber intake, or take fiber supplement once the diarrhea is completely resolved    See Patient Instructions    JUNG [facility]     (2) assistive person

## 2018-03-27 ENCOUNTER — RADIANT APPOINTMENT (OUTPATIENT)
Dept: ULTRASOUND IMAGING | Facility: CLINIC | Age: 33
End: 2018-03-27
Attending: UROLOGY
Payer: COMMERCIAL

## 2018-03-27 ENCOUNTER — OFFICE VISIT (OUTPATIENT)
Dept: UROLOGY | Facility: CLINIC | Age: 33
End: 2018-03-27
Payer: COMMERCIAL

## 2018-03-27 VITALS
SYSTOLIC BLOOD PRESSURE: 112 MMHG | BODY MASS INDEX: 25.56 KG/M2 | HEART RATE: 68 BPM | WEIGHT: 149.7 LBS | HEIGHT: 64 IN | DIASTOLIC BLOOD PRESSURE: 75 MMHG

## 2018-03-27 DIAGNOSIS — N20.0 CALCULUS OF KIDNEY: Primary | ICD-10-CM

## 2018-03-27 DIAGNOSIS — N20.0 CALCULUS OF KIDNEY: ICD-10-CM

## 2018-03-27 RX ORDER — TAMSULOSIN HYDROCHLORIDE 0.4 MG/1
CAPSULE ORAL
COMMUNITY
Start: 2018-02-01 | End: 2018-03-27

## 2018-03-27 RX ORDER — NITROFURANTOIN 25; 75 MG/1; MG/1
CAPSULE ORAL
COMMUNITY
Start: 2018-02-01 | End: 2018-03-27

## 2018-03-27 RX ORDER — HYDROCODONE BITARTRATE AND ACETAMINOPHEN 5; 325 MG/1; MG/1
TABLET ORAL
Refills: 0 | COMMUNITY
Start: 2018-01-17 | End: 2018-03-27

## 2018-03-27 RX ORDER — OXYBUTYNIN CHLORIDE 5 MG/1
TABLET ORAL
COMMUNITY
Start: 2018-02-01 | End: 2018-03-27

## 2018-03-27 RX ORDER — OXYCODONE HYDROCHLORIDE 5 MG/1
TABLET ORAL
COMMUNITY
Start: 2018-02-01 | End: 2018-03-27

## 2018-03-27 ASSESSMENT — PAIN SCALES - GENERAL: PAINLEVEL: NO PAIN (0)

## 2018-03-27 NOTE — MR AVS SNAPSHOT
After Visit Summary   3/27/2018    Grace Dominguez    MRN: [id number]           Patient Information     Date Of Birth          1985        Visit Information        Provider Department      3/27/2018 10:30 AM Kaiden Jefferson MD [facility] Urology and [facility]        Today's Diagnoses     Calculus of kidney    -  1      Care Instructions    Complete the Litholink kit that you have at home and send back to them once complete.  Dr. Jefferson's team will follow up with you as you discussed with him today.    It was a pleasure meeting with you today.  Thank you for allowing me and my team the privilege of caring for you today.  YOU are the reason we are here, and I truly hope we provided you with the excellent service you deserve.  Please let us know if there is anything else we can do for you so that we can be sure you are leaving completely satisfied with your care experience.       Pool Leigh LPN          Follow-ups after your visit        Who to contact     Please call your clinic at [phone] to:    Ask questions about your health    Make or cancel appointments    Discuss your medicines    Learn about your test results    Speak to your doctor            Additional Information About Your Visit        Doctor on [employer] Information     [employer] gives you secure access to your electronic health record. If you see a primary care provider, you can also send messages to your care team and make appointments. If you have questions, please call your primary care clinic.  If you do not have a primary care provider, please call [phone] and they will assist you.      [employer] is an electronic gateway that provides easy, online access to your medical records. With [employer], you can request a clinic appointment, read your test results, renew a prescription or communicate with your care team.     To access your existing account, please contact your [facility]  "Clinic or call [phone] for assistance.        Care EveryWhere ID     This is your Care EveryWhere ID. This could be used by other organizations to access your [place] medical records  [id number]        Your Vitals Were     Pulse Height BMI (Body Mass Index)             68 1.626 m (5' 4\") 25.7 kg/m2          Blood Pressure from Last 3 Encounters:   03/27/18 112/75   02/01/18 115/80   01/17/18 110/73    Weight from Last 3 Encounters:   03/27/18 67.9 kg (149 lb 11.2 oz)   02/01/18 69.9 kg (154 lb)   01/17/18 69.9 kg (154 lb)              Today, you had the following     No orders found for display       Primary Care Provider Office Phone # Fax #    Esther Pearson, JUNG [phone] [address] [address]        Equal Access to Services     LILIANA TREVIZO : Hadii aad ku hadasho Soomaali, waaxda luqadaha, qaybta kaalmada adeegyada, cherelle chanin tosha [place] . So [facility] [phone].    ATENCIÓN: Si edward saucedo, tiene a awad disposición servicios gratuitos de asistencia lingüística. Llame al [phone].    We comply with applicable federal civil rights laws and [place] laws. We do not discriminate on the basis of race, color, national origin, age, disability, sex, sexual orientation, or gender identity.            Thank you!     Thank you for choosing [facility] UROLOGY AND [facility] FOR PROSTATE AND UROLOGIC CANCERS  for your care. Our goal is always to provide you with excellent care. Hearing back from our patients is one way we can continue to improve our services. Please take a few minutes to complete the written survey that you may receive in the mail after your visit with us. Thank you!             Your Updated Medication List - Protect others around you: Learn how to safely use, store and throw away your medicines at www.disposemymeds.org.          This list is accurate as of 3/27/18 10:10 PM.  Always use your most recent med list.                   Brand " Name Dispense Instructions for use Diagnosis    cyclobenzaprine 10 MG tablet    FLEXERIL    60 tablet    Take 0.5-1 tablets (5-10 mg) by mouth 3 times daily as needed for muscle spasms    Bilateral thoracic back pain, unspecified chronicity       diclofenac 50 MG EC tablet    VOLTAREN    60 tablet    Take 1 tablet (50 mg) by mouth 3 times daily as needed for moderate pain    Bilateral thoracic back pain, unspecified chronicity       PRILOSEC PO      Take 10 mg by mouth daily as needed

## 2018-03-27 NOTE — PROGRESS NOTES
UROLOGY FOLLOW-UP NOTE          Chief Complaint:   Today I had the pleasure of seeing Ms. Grace Dominguez in follow-up for a chief complaint of nephrolithiasis.          Interval Update    Grace Dominguez is a very pleasant 33 year old female     Brief  History:  Ms. Dominguez has a history of bilateral non obstructing kidney stones accompanied by right-sided flank pain. She recently underwent right ureteroscopy on 2/1/18.  She removed stent without event at home.    She is also enrolled in smart water bottle study for low urine volume stone risk, which she has been intermittently compliant with.   Awaiting follow-up 24 hour urine.    Today notes:   She is doing quite well. Following her recent ureteroscopy she removed her stent on her own.    She has had notable improvement in degree of right sided flank pain though does have an occasional twinge on the side. She denies any hematuria or infection.     She does report recent headaches over the last 2-3 weeks. This was accompanied by nasal congestion for 4-5 days but this has since resolved.            Physical Exam:   Patient is a 33 year old  female   Vitals: Weight 67.9 kg (149 lb 11.2 oz), currently breastfeeding.  Notable Findings on Exam well-nourished female in no apparent distress        Labs and Pathology:    I personally reviewed all applicable laboratory data and went over findings with patient  Significant for:    CBC RESULTS:  Recent Labs   Lab Test  01/16/18   0828  09/22/17   1900  08/04/17   0720  11/28/16   1126  10/15/16   1555   10/13/16   1140   WBC  7.3  8.2  4.9  8.0   --    --   7.5   HGB  14.5  13.9   --   12.9  6.8*   < >  10.3*   PLT  237  236   --   284   --    --   177    < > = values in this interval not displayed.        BMP RESULTS:  Recent Labs   Lab Test  01/16/18   0828  09/25/17   0935  09/22/17   1900  11/28/16   1126   NA  139  141  140  139   POTASSIUM  3.5  3.9  3.4  3.5   CHLORIDE  104  108  107  104   CO2  28  26  24  26    ANIONGAP  6  7  9  9   GLC  92  84  85  83   BUN  9  11  12  14   CR  0.73  0.76  0.68  0.74   GFRESTIMATED  >90  89  >90  >90  Non African American GFR Calc     GFRESTBLACK  >90  >90  >90  >90  African American GFR Calc     MEG  8.8  9.5  9.3  9.2       UA RESULTS:   Recent Labs   Lab Test  18   0843  17   1135  10/17/17   0815   SG  1.008  1.009  <=1.005   URINEPH  6.0  6.0  6.0   NITRITE  Positive*  Negative  Negative   RBCU  1  1  O - 2   WBCU  1  1  O - 2         Imaging:    I personally reviewed all applicable imaging and went over the below findings with patient.    Results for orders placed or performed in visit on 18   Renal US    Narrative    EXAMINATION: Renal ultrasound on, 3/27/2018 9:03 AM     COMPARISON: CT abdomen pelvis on 2017    HISTORY: 3/26/2018    FINDINGS:    Right kidney: Measures 12.3 cm in length. Parenchyma is of normal  thickness and echogenicity. No focal mass. No hydronephrosis.    Left kidney: Measures 11.2 cm in length. Parenchyma is of normal  thickness and echogenicity. No focal mass. No hydronephrosis.     Bladder: Unremarkable.      Impression    IMPRESSION: Normal renal ultrasound. No hydronephrosis.    I have personally reviewed the examination and initial interpretation  and I agree with the findings.    GABBI REYNOLDS MD              Assessment/Plan   33 year old female w/ hx of nephrolithiasis  - Doing well postoperatively, imaging from today does not provide any evidence of residual stone burden, no hydro  - Plan to complete 24hr urine analysis, follow up after based on results           Past Medical History:     Past Medical History:   Diagnosis Date     Abnormal Pap smear of cervix age 25    colposcopy normal and paps normal since     Chickenpox      History of blood transfusion 2005    After first      History of blood transfusion reaction     with her      History of colposcopy with cervical biopsy 16    visually  normal, no bx taken, pt pregnant     Papanicolaou smear of cervix with low grade squamous intraepithelial lesion (LGSIL) 4/15/16    + HR HPV     PUPP (pruritic urticarial papules and plaques of pregnancy)             Past Surgical History:     Past Surgical History:   Procedure Laterality Date     BIOPSY  10/17/2016    Skin biopsy due to terrible rash     C/SECTION, LOW TRANSVERSE      , Low Transverse      SECTION, TUBAL LIGATION, COMBINED N/A 10/13/2016    Procedure: COMBINED  SECTION, TUBAL LIGATION;  Surgeon: Nicole Pitts MD;  Location: [facility] OR     ESOPHAGOSCOPY, GASTROSCOPY, DUODENOSCOPY (EGD), COMBINED N/A 2017    Procedure: COMBINED ESOPHAGOSCOPY, GASTROSCOPY, DUODENOSCOPY (EGD);  gastroscopy;  Surgeon: Milton Haines MD;  Location: [facility] GI     LASER HOLMIUM LITHOTRIPSY URETER(S), INSERT STENT, COMBINED Right 2018    Procedure: COMBINED CYSTOSCOPY, URETEROSCOPY, LASER HOLMIUM LITHOTRIPSY URETER(S), INSERT STENT;  Cystoscopy, Right Ureteroscopy, Laser Lithotripsy, Stent Placement;  Surgeon: Kaiden Jefferson MD;  Location:  OR     MOUTH SURGERY      wisdom teeth     MOUTH SURGERY      as child            Medications     Current Outpatient Prescriptions   Medication     tamsulosin (FLOMAX) 0.4 MG capsule     cyclobenzaprine (FLEXERIL) 10 MG tablet     diclofenac (VOLTAREN) 50 MG EC tablet     Omeprazole (PRILOSEC PO)     No current facility-administered medications for this visit.             Family History:     Family History   Problem Relation Age of Onset     Congenital Anomalies Daughter      hydrocephalus     Hypertension Maternal Grandmother      Other Cancer Maternal Grandfather      lung     Substance Abuse Maternal Grandfather      alcohol     Other Cancer Paternal Grandmother      lung     Depression Father      suicide     Depression Mother      Substance Abuse Mother      drugs- in treatment     Substance Abuse Paternal Grandfather      alcohol             Social History:     Social History     Social History     Marital status:      Spouse name: N/A     Number of children: N/A     Years of education: N/A     Occupational History     Not on file.     Social History Main Topics     Smoking status: Former Smoker     Quit date: 12/13/2015     Smokeless tobacco: Never Used     Alcohol use No      Comment: 2 drinks weekly- quit with pregnancy     Drug use: No     Sexual activity: Yes     Partners: Male     Birth control/ protection: IUD, Female Surgical     Other Topics Concern     Parent/Sibling W/ Cabg, Mi Or Angioplasty Before 65f 55m? No     Social History Narrative            Allergies:   Review of patient's allergies indicates no known allergies.         Review of Systems:  From intake questionnaire   Negative 14 system review except as noted on HPI, nurse's note.    Scribe Disclosure:   IDominick, am serving as a scribe; to document services personally performed by Kaiden Jefferson MD based on data collection and the provider's statements to me.     IKaiden saw and evaluated this patient and agree with the plan as stated above.  I personally performed all listed procedures.    CC:  Esther Pearson      >15 minutes were spent face to face with patient over half of which was spent providing medical counseling regarding kidney stones.

## 2018-03-27 NOTE — NURSING NOTE
Chief Complaint   Patient presents with     RECHECK     stone management follow up with imaging     Pool Leigh LPN

## 2018-03-27 NOTE — PATIENT INSTRUCTIONS
Complete the Litholink kit that you have at home and send back to them once complete.  Dr. Jefferson's team will follow up with you as you discussed with him today.    It was a pleasure meeting with you today.  Thank you for allowing me and my team the privilege of caring for you today.  YOU are the reason we are here, and I truly hope we provided you with the excellent service you deserve.  Please let us know if there is anything else we can do for you so that we can be sure you are leaving completely satisfied with your care experience.       Pool Leigh LPN

## 2018-03-27 NOTE — LETTER
3/27/2018       RE: Grace Dominguez  [address] [place] [id number]     Dear Colleague,    Thank you for referring your patient, Grace Dominguez, to the [facility] UROLOGY AND INST FOR PROSTATE AND UROLOGIC CANCERS at [facility]. Please see a copy of my visit note below.      UROLOGY FOLLOW-UP NOTE          Chief Complaint:   Today I had the pleasure of seeing Ms. Grace Dominguez in follow-up for a chief complaint of nephrolithiasis.          Interval Update    Grace Dominguez is a very pleasant 33 year old female     Brief  History:  Ms. Dominguez has a history of bilateral non obstructing kidney stones accompanied by right-sided flank pain. She recently underwent right ureteroscopy on 2/1/18.  She removed stent without event at home.    She is also enrolled in smart water bottle study for low urine volume stone risk, which she has been intermittently compliant with.   Awaiting follow-up 24 hour urine.    Today notes:   She is doing quite well. Following her recent ureteroscopy she removed her stent on her own.    She has had notable improvement in degree of right sided flank pain though does have an occasional twinge on the side. She denies any hematuria or infection.     She does report recent headaches over the last 2-3 weeks. This was accompanied by nasal congestion for 4-5 days but this has since resolved.            Physical Exam:   Patient is a 33 year old  female   Vitals: Weight 67.9 kg (149 lb 11.2 oz), currently breastfeeding.  Notable Findings on Exam well-nourished female in no apparent distress        Labs and Pathology:    I personally reviewed all applicable laboratory data and went over findings with patient  Significant for:    CBC RESULTS:  Recent Labs   Lab Test  01/16/18   0828  09/22/17   1900  08/04/17   0720  11/28/16   1126  10/15/16   1555   10/13/16   1140   WBC  7.3  8.2  4.9  8.0   --    --   7.5   HGB  14.5  13.9   --   12.9  6.8*   < >  10.3*    PLT  237  236   --   284   --    --   177    < > = values in this interval not displayed.        BMP RESULTS:  Recent Labs   Lab Test  01/16/18   0828  09/25/17   0935  09/22/17   1900  11/28/16   1126   NA  139  141  140  139   POTASSIUM  3.5  3.9  3.4  3.5   CHLORIDE  104  108  107  104   CO2  28  26  24  26   ANIONGAP  6  7  9  9   GLC  92  84  85  83   BUN  9  11  12  14   CR  0.73  0.76  0.68  0.74   GFRESTIMATED  >90  89  >90  >90  Non African American GFR Calc     GFRESTBLACK  >90  >90  >90  >90  African American GFR Calc     MEG  8.8  9.5  9.3  9.2       UA RESULTS:   Recent Labs   Lab Test  01/16/18   0843  12/19/17   1135  10/17/17   0815   SG  1.008  1.009  <=1.005   URINEPH  6.0  6.0  6.0   NITRITE  Positive*  Negative  Negative   RBCU  1  1  O - 2   WBCU  1  1  O - 2         Imaging:    I personally reviewed all applicable imaging and went over the below findings with patient.    Results for orders placed or performed in visit on 03/27/18   Renal US    Narrative    EXAMINATION: Renal ultrasound on, 3/27/2018 9:03 AM     COMPARISON: CT abdomen pelvis on 9/26/2017    HISTORY: 3/26/2018    FINDINGS:    Right kidney: Measures 12.3 cm in length. Parenchyma is of normal  thickness and echogenicity. No focal mass. No hydronephrosis.    Left kidney: Measures 11.2 cm in length. Parenchyma is of normal  thickness and echogenicity. No focal mass. No hydronephrosis.     Bladder: Unremarkable.      Impression    IMPRESSION: Normal renal ultrasound. No hydronephrosis.    I have personally reviewed the examination and initial interpretation  and I agree with the findings.    GABBI REYNOLDS MD              Assessment/Plan   33 year old female w/ hx of nephrolithiasis  - Doing well postoperatively, imaging from today does not provide any evidence of residual stone burden, no hydro  - Plan to complete 24hr urine analysis, follow up after based on results           Past Medical History:     Past Medical History:    Diagnosis Date     Abnormal Pap smear of cervix age 25    colposcopy normal and paps normal since     Chickenpox      History of blood transfusion 2005    After first      History of blood transfusion reaction     with her      History of colposcopy with cervical biopsy 16    visually normal, no bx taken, pt pregnant     Papanicolaou smear of cervix with low grade squamous intraepithelial lesion (LGSIL) 4/15/16    + HR HPV     PUPP (pruritic urticarial papules and plaques of pregnancy)             Past Surgical History:     Past Surgical History:   Procedure Laterality Date     BIOPSY  10/17/2016    Skin biopsy due to terrible rash     C/SECTION, LOW TRANSVERSE      , Low Transverse      SECTION, TUBAL LIGATION, COMBINED N/A 10/13/2016    Procedure: COMBINED  SECTION, TUBAL LIGATION;  Surgeon: Nicole Pitts MD;  Location: [facility]     ESOPHAGOSCOPY, GASTROSCOPY, DUODENOSCOPY (EGD), COMBINED N/A 2017    Procedure: COMBINED ESOPHAGOSCOPY, GASTROSCOPY, DUODENOSCOPY (EGD);  gastroscopy;  Surgeon: Milton Haines MD;  Location: [facility]     LASER HOLMIUM LITHOTRIPSY URETER(S), INSERT STENT, COMBINED Right 2018    Procedure: COMBINED CYSTOSCOPY, URETEROSCOPY, LASER HOLMIUM LITHOTRIPSY URETER(S), INSERT STENT;  Cystoscopy, Right Ureteroscopy, Laser Lithotripsy, Stent Placement;  Surgeon: Kaiden Jefferson MD;  Location:  OR     MOUTH SURGERY      wisdom teeth     MOUTH SURGERY      as child            Medications     Current Outpatient Prescriptions   Medication     tamsulosin (FLOMAX) 0.4 MG capsule     cyclobenzaprine (FLEXERIL) 10 MG tablet     diclofenac (VOLTAREN) 50 MG EC tablet     Omeprazole (PRILOSEC PO)     No current facility-administered medications for this visit.             Family History:     Family History   Problem Relation Age of Onset     Congenital Anomalies Daughter      hydrocephalus     Hypertension Maternal Grandmother       Other Cancer Maternal Grandfather      lung     Substance Abuse Maternal Grandfather      alcohol     Other Cancer Paternal Grandmother      lung     Depression Father      suicide     Depression Mother      Substance Abuse Mother      drugs- in treatment     Substance Abuse Paternal Grandfather      alcohol            Social History:     Social History     Social History     Marital status:      Spouse name: N/A     Number of children: N/A     Years of education: N/A     Occupational History     Not on file.     Social History Main Topics     Smoking status: Former Smoker     Quit date: 12/13/2015     Smokeless tobacco: Never Used     Alcohol use No      Comment: 2 drinks weekly- quit with pregnancy     Drug use: No     Sexual activity: Yes     Partners: Male     Birth control/ protection: IUD, Female Surgical     Other Topics Concern     Parent/Sibling W/ Cabg, Mi Or Angioplasty Before 65f 55m? No     Social History Narrative            Allergies:   Review of patient's allergies indicates no known allergies.         Review of Systems:  From intake questionnaire   Negative 14 system review except as noted on HPI, nurse's note.    Scribe Disclosure:   Dominick SHARIF, am serving as a scribe; to document services personally performed by Kaiden Jefferson MD based on data collection and the provider's statements to me.     IKaiden saw and evaluated this patient and agree with the plan as stated above.  I personally performed all listed procedures.    CC:  Esther Pearson      >15 minutes were spent face to face with patient over half of which was spent providing medical counseling regarding kidney stones.     Again, thank you for allowing me to participate in the care of your patient.      Sincerely,    Kaiden Jefferson MD

## 2018-04-16 ENCOUNTER — HOSPITAL ENCOUNTER (EMERGENCY)
Facility: CLINIC | Age: 33
Discharge: HOME | End: 2018-04-16
Attending: STUDENT IN AN ORGANIZED HEALTH CARE EDUCATION/TRAINING PROGRAM | Admitting: STUDENT IN AN ORGANIZED HEALTH CARE EDUCATION/TRAINING PROGRAM
Payer: COMMERCIAL

## 2018-04-16 VITALS
BODY MASS INDEX: 25.69 KG/M2 | TEMPERATURE: 99.1 F | OXYGEN SATURATION: 96 % | RESPIRATION RATE: 14 BRPM | WEIGHT: 149.69 LBS | DIASTOLIC BLOOD PRESSURE: 79 MMHG | SYSTOLIC BLOOD PRESSURE: 120 MMHG

## 2018-04-16 DIAGNOSIS — H10.32 ACUTE CONJUNCTIVITIS OF LEFT EYE, UNSPECIFIED ACUTE CONJUNCTIVITIS TYPE: ICD-10-CM

## 2018-04-16 PROCEDURE — 99283 EMERGENCY DEPT VISIT LOW MDM: CPT | Performed by: STUDENT IN AN ORGANIZED HEALTH CARE EDUCATION/TRAINING PROGRAM

## 2018-04-16 PROCEDURE — 99284 EMERGENCY DEPT VISIT MOD MDM: CPT | Mod: Z6 | Performed by: STUDENT IN AN ORGANIZED HEALTH CARE EDUCATION/TRAINING PROGRAM

## 2018-04-16 PROCEDURE — 25000125 ZZHC RX 250: Performed by: STUDENT IN AN ORGANIZED HEALTH CARE EDUCATION/TRAINING PROGRAM

## 2018-04-16 RX ORDER — TETRACAINE HYDROCHLORIDE 5 MG/ML
1-2 SOLUTION OPHTHALMIC ONCE
Status: COMPLETED | OUTPATIENT
Start: 2018-04-16 | End: 2018-04-16

## 2018-04-16 RX ADMIN — TETRACAINE HYDROCHLORIDE 2 DROP: 5 SOLUTION OPHTHALMIC at 13:14

## 2018-04-16 NOTE — ED NOTES
"Comes from eye dr with left eye pain. Saw eye dr today, diagnosed with \" horrific pink eye and started on steroid gtts\" However pain remains. Took ibuprofen at 5am, tylenol at 7am. Vision blurry at times, but good. Tense around left eye. Desires something for pain. Vision ok today at eye dr.   "

## 2018-04-16 NOTE — ED AVS SNAPSHOT
[facility] Emergency Department    [address] [address]    Phone:  [phone]    Fax:  [phone]                                       Grace Dominguez   MRN: [id number]    Department:  [facility] Emergency Department   Date of Visit:  4/16/2018           After Visit Summary Signature Page     I have received my discharge instructions, and my questions have been answered. I have discussed any challenges I see with this plan with the nurse or doctor.    ..........................................................................................................................................  Patient/Patient Representative Signature      ..........................................................................................................................................  Patient Representative Print Name and Relationship to Patient    ..................................................               ................................................  Date                                            Time    ..........................................................................................................................................  Reviewed by Signature/Title    ...................................................              ..............................................  Date                                                            Time

## 2018-04-16 NOTE — ED PROVIDER NOTES
"  History     Chief Complaint   Patient presents with     Eye Pain     eye irritation, seen this am by eye  doctor, given eds for \"pink eye\" states pain has increased, unable to tolerate pain/pressure. Eye doctor not able to see again     HPI  Grace Dominguez is a 33 year old female who presents for evaluation of left ocular pain and tearing which began yesterday.  Patient explains that yesterday afternoon she developed some left eye irritation and mild redness, overnight the discomfort grew more intense as well as clear drainage from the left eye.  [facility], states that she was evaluated thoroughly and discharged with ophthalmologic antibiotic/steroid drops although does not recall the name of the medication.  After returning home the left eye discomfort grew more intense and she was unable to take a nap so she decided to come in for evaluation.  She denies diminished visual acuity from baseline, pain with ocular movement, photophobia, purulent drainage, diplopia, or periorbital swelling.  She has taken ibuprofen with little relief.    Problem List:    Patient Active Problem List    Diagnosis Date Noted     ARDEN (generalized anxiety disorder) 12/26/2016     Priority: Medium     mirena IUD 11/28/2016     Priority: Medium     Mirena IUD placed today 11/28/16 by Faith Miguel MD  LOT# [id number]  Exp: 08/19         Pityriasis rosea 10/14/2016     Priority: Medium     Family history of congenital anomaly 04/15/2016     Priority: Medium     Daughter had hydrocephalus and developmental delay related to infection       Mild dysplasia of cervix 04/15/2016     Priority: Medium     10/14/09 LSIL  2/23/10 [place]: BRITTNY 1.   4/15/16 LSIL/ + HR HPV @ age 31. 12w1d pregnant. Plan colp   5/13/16: [place] visually normal, no biopsies taken. Plan cotest at 1 year.   12/20/16:Pap--NIL, neg HPV. Plan: pap and HPV due in 3 years.           Past Medical History:    Past Medical History:   Diagnosis Date "     Abnormal Pap smear of cervix age 25     Chickenpox      History of blood transfusion 2005     History of blood transfusion reaction      History of colposcopy with cervical biopsy 16     Papanicolaou smear of cervix with low grade squamous intraepithelial lesion (LGSIL) 4/15/16     PUPP (pruritic urticarial papules and plaques of pregnancy)        Past Surgical History:    Past Surgical History:   Procedure Laterality Date     BIOPSY  10/17/2016    Skin biopsy due to terrible rash     C/SECTION, LOW TRANSVERSE      , Low Transverse      SECTION, TUBAL LIGATION, COMBINED N/A 10/13/2016    Procedure: COMBINED  SECTION, TUBAL LIGATION;  Surgeon: Nicole Pitts MD;  Location: [facility] OR     ESOPHAGOSCOPY, GASTROSCOPY, DUODENOSCOPY (EGD), COMBINED N/A 2017    Procedure: COMBINED ESOPHAGOSCOPY, GASTROSCOPY, DUODENOSCOPY (EGD);  gastroscopy;  Surgeon: Milton Haines MD;  Location: [facility] GI     LASER HOLMIUM LITHOTRIPSY URETER(S), INSERT STENT, COMBINED Right 2018    Procedure: COMBINED CYSTOSCOPY, URETEROSCOPY, LASER HOLMIUM LITHOTRIPSY URETER(S), INSERT STENT;  Cystoscopy, Right Ureteroscopy, Laser Lithotripsy, Stent Placement;  Surgeon: Kaiden Jefferson MD;  Location:  OR     MOUTH SURGERY      wisdom teeth     MOUTH SURGERY      as child       Family History:    Family History   Problem Relation Age of Onset     Congenital Anomalies Daughter      hydrocephalus     Hypertension Maternal Grandmother      Other Cancer Maternal Grandfather      lung     Substance Abuse Maternal Grandfather      alcohol     Other Cancer Paternal Grandmother      lung     Depression Father      suicide     Depression Mother      Substance Abuse Mother      drugs- in treatment     Substance Abuse Paternal Grandfather      alcohol       Social History:  Marital Status:   [2]  Social History   Substance Use Topics     Smoking status: Former Smoker     Quit date: 2015      Smokeless tobacco: Never Used     Alcohol use No      Comment: 2 drinks weekly- quit with pregnancy        Medications:      cyclobenzaprine (FLEXERIL) 10 MG tablet   diclofenac (VOLTAREN) 50 MG EC tablet   Omeprazole (PRILOSEC PO)         Review of Systems  Constitutional:  Negative for fever or recent illness.  HENT:  Negative sinus congestion or sore throat.  Eye: Positive for left eye irritation and clear drainage.  Negative for deep boring pain or pain with ocular movement.  Respiratory:  Negative for cough..  Skin:  Negative for rash.    All others reviewed and are negative.      Physical Exam   BP: 120/79  Heart Rate: 90  Temp: 99.1  F (37.3  C)  Resp: 16  Weight: 67.9 kg (149 lb 11.1 oz)  SpO2: 96 %      Physical Exam  Constitutional:  Well developed, well nourished.  Appears nontoxic and in no acute distress.   HENT:  Normocephalic and atraumatic.  Eye:  Vision is a sign per patient.  EOMI and denies diplopia or pain with movement.  PERRLA without pain.  Eyelids appear symmetrical without ptosis.  No proptosis or subconjunctival hemorrhage.  Moderate erythema and conjunctivitis without ciliary flushing or appreciable ocular discharge.  No FB with eyelid eversion.  Cornea clear.  Anterior chamber depth equal bilaterally and without hyphema or hypopyon.  Tetracaine lately resolved patient's discomfort, fluorescein without corneal defect.  Left intraocular pressures average 17.  Neck:  Neck supple.  Cardiovascular:  No cyanosis.   Respiratory:  Effort normal, no respiratory distress.   Musculoskeletal:  Moves extremities spontaneously.  Neurological:  Patient is alert.  Skin:  Skin is warm and dry.  Psychiatric:  Normal mood and affect.      ED Course     ED Course     Procedures               Critical Care time:  none               No results found for this or any previous visit (from the past 24 hour(s)).    Medications   tetracaine (PONTOCAINE) 0.5 % ophthalmic solution 1-2 drop (2 drops Left Eye Given  4/16/18 1314)       Assessments & Plan (with Medical Decision Making)   Grace Dominguez is a 33 year old female who presents to the department complaining of persistent left-sided ocular irritation and stinging sensation with clear discharge.  She does occasionally wear corrective eyewear, primarily eyeglasses and has not worn contact lenses in early 2 months.  Differential diagnosis included optic neuritis, foreign body, acute glaucoma, iritis/uveitis, hyphema, endophthalmitis, corneal abrasion or ulceration.  Clinical recitation appears consistent with unilateral conjunctivitis, unlikely bacterial and without systemic allergic symptoms.  Recommend continuing with OTC analgesic medication and monitoring for expected resolution.  Given patient's profound discomfort we also discussed the risks of tetracaine ophthalmologic drop use.  She maintains that she will use it sparingly and return if developing any concerning symptoms such as periorbital swelling, pain with ocular movement, photophobia, purulent discharge, diminished visual acuity, or other concerns.      Disclaimer:  This note consists of symbols derived from keyboarding, dictation, and/or voice recognition software.  As a result, there may be errors in the script that have gone undetected.  Please consider this when interpreting information found in the chart.        I have reviewed the nursing notes.    I have reviewed the findings, diagnosis, plan and need for follow up with the patient.       Discharge Medication List as of 4/16/2018  1:56 PM          Final diagnoses:   Acute conjunctivitis of left eye, unspecified acute conjunctivitis type       4/16/2018   [facility] EMERGENCY DEPARTMENT     Artemio Gomes DO  04/16/18 1722

## 2018-04-16 NOTE — ED AVS SNAPSHOT
[facility] Emergency Department    [address] [address]    Phone:  [phone]    Fax:  [phone]                                       Grace Dominguez   MRN: [id number]    Department:  [facility] Emergency Department   Date of Visit:  4/16/2018           Patient Information     Date Of Birth          1985        Your diagnoses for this visit were:     Acute conjunctivitis of left eye, unspecified acute conjunctivitis type        You were seen by Artemio Gomes DO.      Follow-up Information     Go to [facility] Emergency Department.    Specialty:  EMERGENCY MEDICINE    Why:  Return if developing deep boring eye pain, periorbital swelling, pain with ocular movement, or symptoms change/progress.    Contact information:    [address] [place] [address] [phone]    Additional information:    The medical center is located at   [address] (between [address] in [place], [place] miles north   of [place]).        Follow up with [facility]. Schedule an appointment as soon as possible for a visit in 3 days.    Why:  Followup for reevaluation if symptoms persist.    Contact information:    [facility] [address] [phone]      Discharge References/Attachments     CONJUNCTIVITIS, NONSPECIFIC (ENGLISH)    RED EYE: CAUSES, UNDERSTANDING (ENGLISH)      24 Hour Appointment Hotline       To make an appointment at any [facility], call [phone] ([phone]). If you don't have a family doctor or clinic, we will help you find one. [facility] clinics are conveniently located to serve the needs of you and your family.             Review of your medicines      Our records show that you are taking the medicines listed below. If these are incorrect, please call your family doctor or clinic.        Dose / Directions Last dose taken    cyclobenzaprine 10 MG tablet   Commonly known as:  FLEXERIL   Dose:   5-10 mg   Quantity:  60 tablet        Take 0.5-1 tablets (5-10 mg) by mouth 3 times daily as needed for muscle spasms   Refills:  3        diclofenac 50 MG EC tablet   Commonly known as:  VOLTAREN   Dose:  50 mg   Quantity:  60 tablet        Take 1 tablet (50 mg) by mouth 3 times daily as needed for moderate pain   Refills:  3        PRILOSEC PO   Dose:  10 mg        Take 10 mg by mouth daily as needed   Refills:  0                Orders Needing Specimen Collection     None      Pending Results     No orders found from 4/14/2018 to 4/17/2018.            Pending Culture Results     No orders found from 4/14/2018 to 4/17/2018.            Pending Results Instructions     If you had any lab results that were not finalized at the time of your Discharge, you can call the ED Lab Result RN at [phone]. You will be contacted by this team for any positive Lab results or changes in treatment. The nurses are available 7 days a week from 10A to 6:30P.  You can leave a message 24 hours per day and they will return your call.        Test Results From Your Hospital Stay               Thank you for choosing [facility]       Thank you for choosing [facility] for your care. Our goal is always to provide you with excellent care. Hearing back from our patients is one way we can continue to improve our services. Please take a few minutes to complete the written survey that you may receive in the mail after you visit with us. Thank you!        [facility] Information     [facility] gives you secure access to your electronic health record. If you see a primary care provider, you can also send messages to your care team and make appointments. If you have questions, please call your primary care clinic.  If you do not have a primary care provider, please call [phone] and they will assist you.        Care EveryWhere ID     This is your Care EveryWhere ID. This could be used by other organizations to access your [facility]  records  [id number]        Equal Access to Services     LILIANA TREVIZO : Asim Arriaga, howard fabian, cherelle landeros. So [facility] [phone].    ATENCIÓN: Si habla español, tiene a awad disposición servicios gratuitos de asistencia lingüística. Llame al [phone].    We comply with applicable federal civil rights laws and [place] laws. We do not discriminate on the basis of race, color, national origin, age, disability, sex, sexual orientation, or gender identity.            After Visit Summary       This is your record. Keep this with you and show to your community pharmacist(s) and doctor(s) at your next visit.

## 2018-06-14 ENCOUNTER — OFFICE VISIT (OUTPATIENT)
Dept: FAMILY MEDICINE | Facility: CLINIC | Age: 33
End: 2018-06-14
Payer: COMMERCIAL

## 2018-06-14 VITALS
TEMPERATURE: 97.1 F | WEIGHT: 149.8 LBS | SYSTOLIC BLOOD PRESSURE: 111 MMHG | DIASTOLIC BLOOD PRESSURE: 71 MMHG | BODY MASS INDEX: 25.71 KG/M2 | HEART RATE: 105 BPM | OXYGEN SATURATION: 96 %

## 2018-06-14 DIAGNOSIS — R07.0 THROAT PAIN: Primary | ICD-10-CM

## 2018-06-14 DIAGNOSIS — J02.0 ACUTE STREPTOCOCCAL PHARYNGITIS: ICD-10-CM

## 2018-06-14 LAB
DEPRECATED S PYO AG THROAT QL EIA: ABNORMAL
SPECIMEN SOURCE: ABNORMAL

## 2018-06-14 PROCEDURE — 99213 OFFICE O/P EST LOW 20 MIN: CPT | Performed by: NURSE PRACTITIONER

## 2018-06-14 PROCEDURE — 87880 STREP A ASSAY W/OPTIC: CPT | Performed by: NURSE PRACTITIONER

## 2018-06-14 RX ORDER — PENICILLIN V POTASSIUM 500 MG/1
500 TABLET, FILM COATED ORAL 2 TIMES DAILY
Qty: 20 TABLET | Refills: 0 | Status: SHIPPED | OUTPATIENT
Start: 2018-06-14 | End: 2018-07-25

## 2018-06-14 NOTE — PROGRESS NOTES
SUBJECTIVE:   Grace Dominguez is a 33 year old female who presents to clinic today for the following health issues:      ENT Symptoms             Symptoms: cc Present Absent Comment   Fever/Chills   x sweats   Fatigue  x     Muscle Aches  x     Eye Irritation   x    Sneezing   x    Nasal Georgi/Drg   x    Sinus Pressure/Pain   x    Loss of smell   x    Dental pain   x    Sore Throat xx x     Swollen Glands  x     Ear Pain/Fullness   x    Cough   x    Wheeze   x    Chest Pain   x    Shortness of breath   x    Rash   x    Other   x      Symptom duration:  one   Symptom severity:  moderate   Treatments tried:  OTC   Contacts:  none         -------------------------------------    Problem list and histories reviewed & adjusted, as indicated.  Additional history: as documented    Patient Active Problem List   Diagnosis     Family history of congenital anomaly     Mild dysplasia of cervix     Pityriasis rosea     mirena IUD     ARDEN (generalized anxiety disorder)     Past Surgical History:   Procedure Laterality Date     BIOPSY  10/17/2016    Skin biopsy due to terrible rash     C/SECTION, LOW TRANSVERSE      , Low Transverse      SECTION, TUBAL LIGATION, COMBINED N/A 10/13/2016    Procedure: COMBINED  SECTION, TUBAL LIGATION;  Surgeon: Nicole Pitts MD;  Location: [facility] OR     ESOPHAGOSCOPY, GASTROSCOPY, DUODENOSCOPY (EGD), COMBINED N/A 2017    Procedure: COMBINED ESOPHAGOSCOPY, GASTROSCOPY, DUODENOSCOPY (EGD);  gastroscopy;  Surgeon: Milton Haines MD;  Location: [facility]     LASER HOLMIUM LITHOTRIPSY URETER(S), INSERT STENT, COMBINED Right 2018    Procedure: COMBINED CYSTOSCOPY, URETEROSCOPY, LASER HOLMIUM LITHOTRIPSY URETER(S), INSERT STENT;  Cystoscopy, Right Ureteroscopy, Laser Lithotripsy, Stent Placement;  Surgeon: Kaiden Jefferson MD;  Location:  OR     MOUTH SURGERY      wisdom teeth     MOUTH SURGERY      as child       Social History   Substance Use Topics      Smoking status: Former Smoker     Quit date: 12/13/2015     Smokeless tobacco: Never Used     Alcohol use No      Comment: 2 drinks weekly- quit with pregnancy     Family History   Problem Relation Age of Onset     Congenital Anomalies Daughter      hydrocephalus     Hypertension Maternal Grandmother      Other Cancer Maternal Grandfather      lung     Substance Abuse Maternal Grandfather      alcohol     Other Cancer Paternal Grandmother      lung     Depression Father      suicide     Depression Mother      Substance Abuse Mother      drugs- in treatment     Substance Abuse Paternal Grandfather      alcohol           Reviewed and updated as needed this visit by clinical staff       Reviewed and updated as needed this visit by Provider         ROS:  Constitutional, HEENT, cardiovascular, pulmonary, GI, , musculoskeletal, neuro, skin, endocrine and psych systems are negative, except as otherwise noted.    OBJECTIVE:     /71 (BP Location: Left arm, Patient Position: Chair, Cuff Size: Adult Regular)  Pulse 105  Temp 97.1  F (36.2  C) (Tympanic)  Wt 149 lb 12.8 oz (67.9 kg)  SpO2 96%  BMI 25.71 kg/m2  Body mass index is 25.71 kg/(m^2).  GENERAL: healthy, alert and no distress  HENT: normal cephalic/atraumatic, ear canals and TM's normal, nose and mouth without ulcers or lesions, oropharynx clear, oral mucous membranes moist, tonsillar erythema and tonsillar exudate  NECK: no adenopathy, no asymmetry, masses, or scars and thyroid normal to palpation  RESP: lungs clear to auscultation - no rales, rhonchi or wheezes  CV: regular rate and rhythm, normal S1 S2, no S3 or S4, no murmur, click or rub, no peripheral edema and peripheral pulses strong  MS: no gross musculoskeletal defects noted, no edema    Diagnostic Test Results:  Results for orders placed or performed in visit on 06/14/18 (from the past 24 hour(s))   Rapid strep screen   Result Value Ref Range    Specimen Description Throat     Rapid Strep A  Screen (A)      POSITIVE: Group A Streptococcal antigen detected by immunoassay.       ASSESSMENT/PLAN:       1. Acute streptococcal pharyngitis    - penicillin V potassium (VEETID) 500 MG tablet; Take 1 tablet (500 mg) by mouth 2 times daily  Dispense: 20 tablet; Refill: 0    2. Throat pain    - Rapid strep screen    Patient Instructions     Pharyngitis: Strep (Confirmed)    You have had a positive test for strep throat. Strep throat is a contagious illness. It is spread by coughing, kissing or by touching others after touching your mouth or nose. Symptoms include throat pain that is worse with swallowing, aching all over, headache, and fever. It is treated with antibiotic medicine. This should help you start to feel better in 1 to 2 days.  Home care    Rest at home. Drink plenty of fluids to you won't get dehydrated.    No work or school for the first 2 days of taking the antibiotics. After this time, you will not be contagious. You can then return to school or work if you are feeling better.     Take antibiotic medicine for the full 10 days, even if you feel better. This is very important to ensure the infection is treated. It is also important to prevent medicine-resistant germs from developing. If you were given an antibiotic shot, you don't need any more antibiotics.    You may use acetaminophen or ibuprofen to control pain or fever, unless another medicine was prescribed for this. Talk with your healthcare provider before taking these medicines if you have chronic liver or kidney disease. Also talk with your healthcare provider if you have had a stomach ulcer or GI bleeding.    Throat lozenges or sprays help reduce pain. Gargling with warm saltwater will also reduce throat pain. Dissolve 1/2 teaspoon of salt in 1 glass of warm water. This may be useful just before meals.     Soft foods are OK. Don't eat salty or spicy foods.  Follow-up care  Follow up with your healthcare provider or our staff if you don't  get better over the next week.  When to seek medical advice  Call your healthcare provider right away if any of these occur:    Fever of 100.4 F (38 C) or higher, or as directed by your healthcare provider    New or worsening ear pain, sinus pain, or headache    Painful lumps in the back of neck    Stiff neck    Lymph nodes getting larger or becoming soft in the middle    You can't swallow liquids or you can't open your mouth wide because of throat pain    Signs of dehydration. These include very dark urine or no urine, sunken eyes, and dizziness.    Trouble breathing or noisy breathing    Muffled voice    Rash  Prevention  Here are steps you can take to help prevent an infection:    Keep good hand washing habits.    Don t have close contact with people who have sore throats, colds, or other upper respiratory infections.    Don t smoke, and stay away from secondhand smoke.  Date Last Reviewed: 11/1/2017 2000-2017 [employer]. [address] [place] [address]. All rights reserved. This information is not intended as a substitute for professional medical care. Always follow your healthcare professional's instructions.            [facility]

## 2018-06-14 NOTE — MR AVS SNAPSHOT
After Visit Summary   6/14/2018    Grace Dominguez    MRN: [id number]           Patient Information     Date Of Birth          1985        Visit Information        Provider Department      6/14/2018 8:40 AM Macy Wen APRN [facility]        Today's Diagnoses     Throat pain    -  1    Acute streptococcal pharyngitis          Care Instructions      Pharyngitis: Strep (Confirmed)    You have had a positive test for strep throat. Strep throat is a contagious illness. It is spread by coughing, kissing or by touching others after touching your mouth or nose. Symptoms include throat pain that is worse with swallowing, aching all over, headache, and fever. It is treated with antibiotic medicine. This should help you start to feel better in 1 to 2 days.  Home care    Rest at home. Drink plenty of fluids to you won't get dehydrated.    No work or school for the first 2 days of taking the antibiotics. After this time, you will not be contagious. You can then return to school or work if you are feeling better.     Take antibiotic medicine for the full 10 days, even if you feel better. This is very important to ensure the infection is treated. It is also important to prevent medicine-resistant germs from developing. If you were given an antibiotic shot, you don't need any more antibiotics.    You may use acetaminophen or ibuprofen to control pain or fever, unless another medicine was prescribed for this. Talk with your healthcare provider before taking these medicines if you have chronic liver or kidney disease. Also talk with your healthcare provider if you have had a stomach ulcer or GI bleeding.    Throat lozenges or sprays help reduce pain. Gargling with warm saltwater will also reduce throat pain. Dissolve 1/2 teaspoon of salt in 1 glass of warm water. This may be useful just before meals.     Soft foods are OK. Don't eat salty or spicy foods.  Follow-up care  Follow up with your  healthcare provider or our staff if you don't get better over the next week.  When to seek medical advice  Call your healthcare provider right away if any of these occur:    Fever of 100.4 F (38 C) or higher, or as directed by your healthcare provider    New or worsening ear pain, sinus pain, or headache    Painful lumps in the back of neck    Stiff neck    Lymph nodes getting larger or becoming soft in the middle    You can't swallow liquids or you can't open your mouth wide because of throat pain    Signs of dehydration. These include very dark urine or no urine, sunken eyes, and dizziness.    Trouble breathing or noisy breathing    Muffled voice    Rash  Prevention  Here are steps you can take to help prevent an infection:    Keep good hand washing habits.    Don t have close contact with people who have sore throats, colds, or other upper respiratory infections.    Don t smoke, and stay away from secondhand smoke.  Date Last Reviewed: 11/1/2017 2000-2017 [employer]. [address] [place] [address]. All rights reserved. This information is not intended as a substitute for professional medical care. Always follow your healthcare professional's instructions.                Follow-ups after your visit        Who to contact     If you have questions or need follow up information about today's clinic visit or your schedule please contact [facility] directly at [phone].  Normal or non-critical lab and imaging results will be communicated to you by MyChart, letter or phone within 4 business days after the clinic has received the results. If you do not hear from us within 7 days, please contact the clinic through MyChart or phone. If you have a critical or abnormal lab result, we will notify you by phone as soon as possible.  Submit refill requests through [address] or call your pharmacy and they will forward the refill request to us. Please allow 3 business days for your  refill to be completed.          Additional Information About Your Visit        MyChart Information     [employer] gives you secure access to your electronic health record. If you see a primary care provider, you can also send messages to your care team and make appointments. If you have questions, please call your primary care clinic.  If you do not have a primary care provider, please call [phone] and they will assist you.        Care EveryWhere ID     This is your Care EveryWhere ID. This could be used by other organizations to access your [place] medical records  [id number]        Your Vitals Were     Pulse Temperature Pulse Oximetry BMI (Body Mass Index)          105 97.1  F (36.2  C) (Tympanic) 96% 25.71 kg/m2         Blood Pressure from Last 3 Encounters:   06/14/18 111/71   04/16/18 120/79   03/27/18 112/75    Weight from Last 3 Encounters:   06/14/18 149 lb 12.8 oz (67.9 kg)   04/16/18 149 lb 11.1 oz (67.9 kg)   03/27/18 149 lb 11.2 oz (67.9 kg)              We Performed the Following     Rapid strep screen          Today's Medication Changes          These changes are accurate as of 6/14/18  8:56 AM.  If you have any questions, ask your nurse or doctor.               Start taking these medicines.        Dose/Directions    penicillin V potassium 500 MG tablet   Commonly known as:  VEETID   Used for:  Acute streptococcal pharyngitis        Dose:  500 mg   Take 1 tablet (500 mg) by mouth 2 times daily   Quantity:  20 tablet   Refills:  0            Where to get your medicines      These medications were sent to [place] [address] [place] [address] [address]     Phone:  [phone]     penicillin V potassium 500 MG tablet                Primary Care Provider Office Phone # Fax #    JUNG Ramos [phone] [address] [facility] [address]        Equal Access to Services     LILIANA TREVIZO AH: Asim conteh  Sodaisy, wamelissada luqadaha, qaybta kaalcinthya santos, cherelle dustinin hayaan kemarruben sugeytio laMarlanicole candy. So [facility] [phone].    ATENCIÓN: Si edward saucedo, tiene a awad disposición servicios gratuitos de asistencia lingüística. Isidro al [phone].    We comply with applicable federal civil rights laws and [place] laws. We do not discriminate on the basis of race, color, national origin, age, disability, sex, sexual orientation, or gender identity.            Thank you!     Thank you for choosing [facility]  for your care. Our goal is always to provide you with excellent care. Hearing back from our patients is one way we can continue to improve our services. Please take a few minutes to complete the written survey that you may receive in the mail after your visit with us. Thank you!             Your Updated Medication List - Protect others around you: Learn how to safely use, store and throw away your medicines at www.disposemymeds.org.          This list is accurate as of 6/14/18  8:56 AM.  Always use your most recent med list.                   Brand Name Dispense Instructions for use Diagnosis    cyclobenzaprine 10 MG tablet    FLEXERIL    60 tablet    Take 0.5-1 tablets (5-10 mg) by mouth 3 times daily as needed for muscle spasms    Bilateral thoracic back pain, unspecified chronicity       diclofenac 50 MG EC tablet    VOLTAREN    60 tablet    Take 1 tablet (50 mg) by mouth 3 times daily as needed for moderate pain    Bilateral thoracic back pain, unspecified chronicity       penicillin V potassium 500 MG tablet    VEETID    20 tablet    Take 1 tablet (500 mg) by mouth 2 times daily    Acute streptococcal pharyngitis       PRILOSEC PO      Take 10 mg by mouth daily as needed

## 2018-06-14 NOTE — PATIENT INSTRUCTIONS
Pharyngitis: Strep (Confirmed)    You have had a positive test for strep throat. Strep throat is a contagious illness. It is spread by coughing, kissing or by touching others after touching your mouth or nose. Symptoms include throat pain that is worse with swallowing, aching all over, headache, and fever. It is treated with antibiotic medicine. This should help you start to feel better in 1 to 2 days.  Home care    Rest at home. Drink plenty of fluids to you won't get dehydrated.    No work or school for the first 2 days of taking the antibiotics. After this time, you will not be contagious. You can then return to school or work if you are feeling better.     Take antibiotic medicine for the full 10 days, even if you feel better. This is very important to ensure the infection is treated. It is also important to prevent medicine-resistant germs from developing. If you were given an antibiotic shot, you don't need any more antibiotics.    You may use acetaminophen or ibuprofen to control pain or fever, unless another medicine was prescribed for this. Talk with your healthcare provider before taking these medicines if you have chronic liver or kidney disease. Also talk with your healthcare provider if you have had a stomach ulcer or GI bleeding.    Throat lozenges or sprays help reduce pain. Gargling with warm saltwater will also reduce throat pain. Dissolve 1/2 teaspoon of salt in 1 glass of warm water. This may be useful just before meals.     Soft foods are OK. Don't eat salty or spicy foods.  Follow-up care  Follow up with your healthcare provider or our staff if you don't get better over the next week.  When to seek medical advice  Call your healthcare provider right away if any of these occur:    Fever of 100.4 F (38 C) or higher, or as directed by your healthcare provider    New or worsening ear pain, sinus pain, or headache    Painful lumps in the back of neck    Stiff neck    Lymph nodes getting larger or  becoming soft in the middle    You can't swallow liquids or you can't open your mouth wide because of throat pain    Signs of dehydration. These include very dark urine or no urine, sunken eyes, and dizziness.    Trouble breathing or noisy breathing    Muffled voice    Rash  Prevention  Here are steps you can take to help prevent an infection:    Keep good hand washing habits.    Don t have close contact with people who have sore throats, colds, or other upper respiratory infections.    Don t smoke, and stay away from secondhand smoke.  Date Last Reviewed: 11/1/2017 2000-2017 [employer]. [address] [place] [address]. All rights reserved. This information is not intended as a substitute for professional medical care. Always follow your healthcare professional's instructions.

## 2018-06-14 NOTE — NURSING NOTE
"Initial /71 (BP Location: Left arm, Patient Position: Chair, Cuff Size: Adult Regular)  Pulse 105  Temp 97.1  F (36.2  C) (Tympanic)  Wt 149 lb 12.8 oz (67.9 kg)  SpO2 96%  BMI 25.71 kg/m2 Estimated body mass index is 25.71 kg/(m^2) as calculated from the following:    Height as of 3/27/18: 5' 4\" (1.626 m).    Weight as of this encounter: 149 lb 12.8 oz (67.9 kg). .    Janice Henderson    "

## 2018-07-23 ENCOUNTER — HOSPITAL ENCOUNTER (EMERGENCY)
Facility: CLINIC | Age: 33
Discharge: HOME | End: 2018-07-23
Attending: EMERGENCY MEDICINE | Admitting: EMERGENCY MEDICINE

## 2018-07-23 VITALS
HEART RATE: 93 BPM | TEMPERATURE: 98 F | DIASTOLIC BLOOD PRESSURE: 77 MMHG | BODY MASS INDEX: 25.75 KG/M2 | OXYGEN SATURATION: 97 % | SYSTOLIC BLOOD PRESSURE: 118 MMHG | WEIGHT: 150 LBS | RESPIRATION RATE: 24 BRPM

## 2018-07-23 DIAGNOSIS — F41.0 PANIC ATTACK: ICD-10-CM

## 2018-07-23 DIAGNOSIS — F41.1 GAD (GENERALIZED ANXIETY DISORDER): ICD-10-CM

## 2018-07-23 PROCEDURE — 96372 THER/PROPH/DIAG INJ SC/IM: CPT | Performed by: EMERGENCY MEDICINE

## 2018-07-23 PROCEDURE — 99285 EMERGENCY DEPT VISIT HI MDM: CPT | Mod: 25 | Performed by: EMERGENCY MEDICINE

## 2018-07-23 PROCEDURE — 93010 ELECTROCARDIOGRAM REPORT: CPT | Mod: Z6 | Performed by: EMERGENCY MEDICINE

## 2018-07-23 PROCEDURE — 93005 ELECTROCARDIOGRAM TRACING: CPT | Performed by: EMERGENCY MEDICINE

## 2018-07-23 PROCEDURE — 25000128 H RX IP 250 OP 636: Performed by: EMERGENCY MEDICINE

## 2018-07-23 PROCEDURE — 99285 EMERGENCY DEPT VISIT HI MDM: CPT | Performed by: EMERGENCY MEDICINE

## 2018-07-23 RX ORDER — LORAZEPAM 1 MG/1
1 TABLET ORAL 2 TIMES DAILY
Qty: 14 TABLET | Refills: 0 | Status: SHIPPED | OUTPATIENT
Start: 2018-07-23 | End: 2018-07-25

## 2018-07-23 RX ORDER — LORAZEPAM 2 MG/ML
1 INJECTION INTRAMUSCULAR ONCE
Status: COMPLETED | OUTPATIENT
Start: 2018-07-23 | End: 2018-07-23

## 2018-07-23 RX ADMIN — LORAZEPAM 1 MG: 2 INJECTION INTRAMUSCULAR; INTRAVENOUS at 20:40

## 2018-07-23 NOTE — ED AVS SNAPSHOT
[facility] Emergency Department    [address] [facility] [address]    Phone:  [phone]    Fax:  [phone]                                       Grace Dominguez   MRN: [id number]    Department:  [facility] Emergency Department   Date of Visit:  7/23/2018           After Visit Summary Signature Page     I have received my discharge instructions, and my questions have been answered. I have discussed any challenges I see with this plan with the nurse or doctor.    ..........................................................................................................................................  Patient/Patient Representative Signature      ..........................................................................................................................................  Patient Representative Print Name and Relationship to Patient    ..................................................               ................................................  Date                                            Time    ..........................................................................................................................................  Reviewed by Signature/Title    ...................................................              ..............................................  Date                                                            Time

## 2018-07-23 NOTE — ED AVS SNAPSHOT
[facility] Emergency Department    [address] [facility] [address]    Phone:  [phone]    Fax:  [phone]                                       Grace Dominguez   MRN: [id number]    Department:  [facility] Emergency Department   Date of Visit:  7/23/2018           Patient Information     Date Of Birth          1985        Your diagnoses for this visit were:     ARDEN (generalized anxiety disorder)     Panic attack        You were seen by Randy Law DO.        Discharge Instructions       Lorazepam 1 mg twice daily.  Clinic appt. In 1 week        Discharge References/Attachments     PANIC DISORDER (PANIC ATTACK), TREATING WITH THERAPY (ENGLISH)    PANIC DISORDER (PANIC ATTACK), UNDERSTANDING (ENGLISH)      24 Hour Appointment Hotline       To make an appointment at any [facility] [place], call [phone] ([phone]). If you don't have a family doctor or clinic, we will help you find one. [facility] clinics are conveniently located to serve the needs of you and your family.             Review of your medicines      START taking        Dose / Directions Last dose taken    LORazepam 1 MG tablet   Commonly known as:  ATIVAN   Dose:  1 mg   Quantity:  14 tablet        Take 1 tablet (1 mg) by mouth 2 times daily   Refills:  0          Our records show that you are taking the medicines listed below. If these are incorrect, please call your family doctor or clinic.        Dose / Directions Last dose taken    cyclobenzaprine 10 MG tablet   Commonly known as:  FLEXERIL   Dose:  5-10 mg   Quantity:  60 tablet        Take 0.5-1 tablets (5-10 mg) by mouth 3 times daily as needed for muscle spasms   Refills:  3        penicillin V potassium 500 MG tablet   Commonly known as:  VEETID   Dose:  500 mg   Quantity:  20 tablet        Take 1 tablet (500 mg) by mouth 2 times daily   Refills:  0        PRILOSEC PO   Dose:  10 mg        Take 10 mg by mouth daily as needed   Refills:  0                 Prescriptions were sent or printed at these locations (1 Prescription)                   Other Prescriptions                Printed at Department/Unit printer (1 of 1)         LORazepam (ATIVAN) 1 MG tablet                Procedures and tests performed during your visit     EKG 12-lead, tracing only      Orders Needing Specimen Collection     None      Pending Results     No orders found from 7/21/2018 to 7/24/2018.            Pending Culture Results     No orders found from 7/21/2018 to 7/24/2018.            Pending Results Instructions     If you had any lab results that were not finalized at the time of your Discharge, you can call the ED Lab Result RN at [phone]. You will be contacted by this team for any positive Lab results or changes in treatment. The nurses are available 7 days a week from 10A to 6:30P.  You can leave a message 24 hours per day and they will return your call.        Test Results From Your Hospital Stay               Thank you for choosing [facility]       Thank you for choosing [facility] for your care. Our goal is always to provide you with excellent care. Hearing back from our patients is one way we can continue to improve our services. Please take a few minutes to complete the written survey that you may receive in the mail after you visit with us. Thank you!        [employer] Information     [employer] gives you secure access to your electronic health record. If you see a primary care provider, you can also send messages to your care team and make appointments. If you have questions, please call your primary care clinic.  If you do not have a primary care provider, please call [phone] and they will assist you.        Care EveryWhere ID     This is your Care EveryWhere ID. This could be used by other organizations to access your [facility] [place] medical records  [id number]        Equal Access to Services     LILIANA TREVIZO : howard Shipman, nico carrion  cherelle santos ah. So [facility] [phone].    ATENCIÓN: Si habla español, tiene a awad disposición servicios gratuitos de asistencia lingüística. Llame al [phone].    We comply with applicable federal civil rights laws and [place] laws. We do not discriminate on the basis of race, color, national origin, age, disability, sex, sexual orientation, or gender identity.            After Visit Summary       This is your record. Keep this with you and show to your community pharmacist(s) and doctor(s) at your next visit.

## 2018-07-24 ASSESSMENT — ENCOUNTER SYMPTOMS
DECREASED CONCENTRATION: 1
COLOR CHANGE: 0
ARTHRALGIAS: 0
NERVOUS/ANXIOUS: 1
HALLUCINATIONS: 0
EYE REDNESS: 0
CONFUSION: 0
NECK STIFFNESS: 0
FEVER: 0
DIFFICULTY URINATING: 0
SLEEP DISTURBANCE: 1
HEADACHES: 0
ABDOMINAL PAIN: 0
AGITATION: 0
SHORTNESS OF BREATH: 0

## 2018-07-24 NOTE — ED NOTES
Resting comfortably. Pt states breathing is much easier now; nausea is resolved, no needs currently. Call light within reach. Pt encouraged to call if anything changes or other needs arise.

## 2018-07-24 NOTE — ED PROVIDER NOTES
History     Chief Complaint   Patient presents with     Anxiety     Shortness of Breath     feels like not getting enough air. Gagging, Not sure what started first, SOA or Anxiety     HPI  Grace Dominguez is a 33 year old female who presents with heightened anxiety.  Second past medical history for generalized anxiety disorder with occasional panic.  No history for agoraphobia.  Reports last 24 hours she has had marked worsening of anxiety to where she is hyperventilating, gagging, constant rumination that she is going to die, unable to sleep.  No acute stressors.  Chronic stressors of parenting and finances.  No substance use.    Problem List:    Patient Active Problem List    Diagnosis Date Noted     ARDEN (generalized anxiety disorder) 12/26/2016     Priority: Medium     mirena IUD 11/28/2016     Priority: Medium     Mirena IUD placed today 11/28/16 by Faith Miguel MD  LOT# [id number]  Exp: 08/19         Pityriasis rosea 10/14/2016     Priority: Medium     Family history of congenital anomaly 04/15/2016     Priority: Medium     Daughter had hydrocephalus and developmental delay related to infection       Mild dysplasia of cervix 04/15/2016     Priority: Medium     10/14/09 LSIL  2/23/10 [place]: BRITTNY 1.   4/15/16 LSIL/ + HR HPV @ age 31. 12w1d pregnant. Plan colp   5/13/16: [place] visually normal, no biopsies taken. Plan cotest at 1 year.   12/20/16:Pap--NIL, neg HPV. Plan: pap and HPV due in 3 years.           Past Medical History:    Past Medical History:   Diagnosis Date     Abnormal Pap smear of cervix age 25     Chickenpox      History of blood transfusion 5/23/2005     History of blood transfusion reaction      History of colposcopy with cervical biopsy 5/13/16     Papanicolaou smear of cervix with low grade squamous intraepithelial lesion (LGSIL) 4/15/16     PUPP (pruritic urticarial papules and plaques of pregnancy)        Past Surgical History:    Past Surgical History:   Procedure Laterality Date     BIOPSY   10/17/2016    Skin biopsy due to terrible rash     C/SECTION, LOW TRANSVERSE      , Low Transverse      SECTION, TUBAL LIGATION, COMBINED N/A 10/13/2016    Procedure: COMBINED  SECTION, TUBAL LIGATION;  Surgeon: Nicole Pitts MD;  Location: [facility]     ESOPHAGOSCOPY, GASTROSCOPY, DUODENOSCOPY (EGD), COMBINED N/A 2017    Procedure: COMBINED ESOPHAGOSCOPY, GASTROSCOPY, DUODENOSCOPY (EGD);  gastroscopy;  Surgeon: Milton Haines MD;  Location: [facility] GI     LASER HOLMIUM LITHOTRIPSY URETER(S), INSERT STENT, COMBINED Right 2018    Procedure: COMBINED CYSTOSCOPY, URETEROSCOPY, LASER HOLMIUM LITHOTRIPSY URETER(S), INSERT STENT;  Cystoscopy, Right Ureteroscopy, Laser Lithotripsy, Stent Placement;  Surgeon: Kaiden Jefferson MD;  Location:  OR     MOUTH SURGERY      wisdom teeth     MOUTH SURGERY      as child       Family History:    Family History   Problem Relation Age of Onset     Congenital Anomalies Daughter      hydrocephalus     Hypertension Maternal Grandmother      Other Cancer Maternal Grandfather      lung     Substance Abuse Maternal Grandfather      alcohol     Other Cancer Paternal Grandmother      lung     Depression Father      suicide     Depression Mother      Substance Abuse Mother      drugs- in treatment     Substance Abuse Paternal Grandfather      alcohol       Social History:  Marital Status:   [2]  Social History   Substance Use Topics     Smoking status: Former Smoker     Quit date: 2015     Smokeless tobacco: Never Used     Alcohol use No      Comment: 2 drinks weekly- quit with pregnancy        Medications:      cyclobenzaprine (FLEXERIL) 10 MG tablet   Omeprazole (PRILOSEC PO)   penicillin V potassium (VEETID) 500 MG tablet         Review of Systems   Constitutional: Negative for fever.   HENT: Negative for congestion.    Eyes: Negative for redness.   Respiratory: Negative for shortness of breath.    Cardiovascular: Negative for  chest pain.   Gastrointestinal: Negative for abdominal pain.   Genitourinary: Negative for difficulty urinating.   Musculoskeletal: Negative for arthralgias and neck stiffness.   Skin: Negative for color change.   Neurological: Negative for headaches.   Psychiatric/Behavioral: Positive for decreased concentration and sleep disturbance. Negative for agitation, confusion, hallucinations, self-injury and suicidal ideas. The patient is nervous/anxious.        Physical Exam   BP: 118/77  Pulse: 93  Heart Rate: 92  Temp: 98  F (36.7  C)  Resp: 20  Weight: 68 kg (150 lb)  SpO2: 99 %      Physical Exam   Constitutional: She is oriented to person, place, and time. No distress.   HENT:   Head: Normocephalic and atraumatic.   Eyes: EOM are normal. Pupils are equal, round, and reactive to light.   Neck: Normal range of motion. Neck supple.   Cardiovascular: Regular rhythm and normal heart sounds.    Pulmonary/Chest: Breath sounds normal. No respiratory distress. She exhibits no tenderness.   Abdominal: Soft. Bowel sounds are normal. There is no tenderness.   Musculoskeletal: Normal range of motion. She exhibits no tenderness.        Cervical back: She exhibits no tenderness.        Thoracic back: She exhibits no tenderness.        Lumbar back: She exhibits no tenderness.   Lymphadenopathy:     She has no cervical adenopathy.   Neurological: She is alert and oriented to person, place, and time.   Skin: No abrasion and no laceration noted. She is not diaphoretic.   Psychiatric: Her behavior is normal. Judgment and thought content normal. Her mood appears anxious. Her speech is rapid and/or pressured. Cognition and memory are normal. She expresses no homicidal and no suicidal ideation. She expresses no suicidal plans and no homicidal plans.   Crying, appears frightened   Nursing note and vitals reviewed.      ED Course     ED Course     Procedures          EKG:  Interpretation by Randy Law DO.   Indication: Shortness of  breath  Normal sinus rhythm  Rate = 90 bpm  Normal repolarization  Normal axis  No ectopy  Old EKG for comparison  Impressions: Normal EKG      Medications   LORazepam (ATIVAN) injection 1 mg (1 mg Intramuscular Given 7/23/18 2040)       Assessments & Plan (with Medical Decision Making) 33 year old female with generalized anxiety disorder presents with acute panic.  Does not suffer from agoraphobia.  Responded favorably to lorazepam 1 mg IM.  No history for any substance use.  Patient was referred back to primary clinic provider.  Would recommend counseling/education for ways to promote relaxation/consideration for SSRI  Discharged with option for lorazepam.  May take up to 1 mg twice daily.  Was advised not to mix with alcohol, drive or operate machinery.  She is aware this is a short-term bridge until she can be evaluated for long-term management.  She suffers from anxiety for many years.     I have reviewed the nursing notes.    I have reviewed the findings, diagnosis, plan and need for follow up with the patient.      New Prescriptions    No medications on file       Final diagnoses:   ARDEN (generalized anxiety disorder)   Panic attack       7/23/2018   [facility] EMERGENCY DEPARTMENT     Randy Law DO  07/24/18 5055

## 2018-07-24 NOTE — ED NOTES
Pt presents to ED with complaints not being able to get a full breath in. Pt notes it feels like her chest is tight and it is preventing her from getting in a full breath. Pt states that she does she gags. Pt also notes feeling anxious and like her fingers are tingly. Pt states she has not vomited.

## 2018-07-24 NOTE — ED NOTES
Much more calm, feels improved. Desires discharge. Pt discharge instructions reviewed and received. Prescription handed to patient.  There are no unanswered questions at the time of discharge. Pt has a  to escort them home and pt understands hazards of driving after receiving mood altering medications. Escorted to lobby for discharge.

## 2018-07-25 ENCOUNTER — OFFICE VISIT (OUTPATIENT)
Dept: FAMILY MEDICINE | Facility: CLINIC | Age: 33
End: 2018-07-25

## 2018-07-25 VITALS
TEMPERATURE: 96.4 F | BODY MASS INDEX: 25.61 KG/M2 | HEART RATE: 89 BPM | OXYGEN SATURATION: 98 % | DIASTOLIC BLOOD PRESSURE: 72 MMHG | WEIGHT: 150 LBS | SYSTOLIC BLOOD PRESSURE: 112 MMHG | HEIGHT: 64 IN

## 2018-07-25 DIAGNOSIS — F41.1 GAD (GENERALIZED ANXIETY DISORDER): Primary | ICD-10-CM

## 2018-07-25 PROCEDURE — 99213 OFFICE O/P EST LOW 20 MIN: CPT | Performed by: NURSE PRACTITIONER

## 2018-07-25 RX ORDER — LORAZEPAM 1 MG/1
1 TABLET ORAL 2 TIMES DAILY PRN
Qty: 20 TABLET | Refills: 0 | Status: SHIPPED | OUTPATIENT
Start: 2018-07-25 | End: 2018-10-03

## 2018-07-25 RX ORDER — CITALOPRAM HYDROBROMIDE 20 MG/1
TABLET ORAL
Qty: 30 TABLET | Refills: 5 | Status: SHIPPED | OUTPATIENT
Start: 2018-07-25 | End: 2019-01-17

## 2018-07-25 ASSESSMENT — ANXIETY QUESTIONNAIRES
4. TROUBLE RELAXING: MORE THAN HALF THE DAYS
IF YOU CHECKED OFF ANY PROBLEMS ON THIS QUESTIONNAIRE, HOW DIFFICULT HAVE THESE PROBLEMS MADE IT FOR YOU TO DO YOUR WORK, TAKE CARE OF THINGS AT HOME, OR GET ALONG WITH OTHER PEOPLE: VERY DIFFICULT
5. BEING SO RESTLESS THAT IT IS HARD TO SIT STILL: SEVERAL DAYS
GAD7 TOTAL SCORE: 10
1. FEELING NERVOUS, ANXIOUS, OR ON EDGE: NEARLY EVERY DAY
6. BECOMING EASILY ANNOYED OR IRRITABLE: SEVERAL DAYS
7. FEELING AFRAID AS IF SOMETHING AWFUL MIGHT HAPPEN: SEVERAL DAYS
2. NOT BEING ABLE TO STOP OR CONTROL WORRYING: SEVERAL DAYS
3. WORRYING TOO MUCH ABOUT DIFFERENT THINGS: SEVERAL DAYS

## 2018-07-25 NOTE — MR AVS SNAPSHOT
After Visit Summary   7/25/2018    Grace Dominguez    MRN: [id number]           Patient Information     Date Of Birth          1985        Visit Information        Provider Department      7/25/2018 7:00 AM Esther Pearson APRN CNP [facility]        Today's Diagnoses     ARDEN (generalized anxiety disorder)    -  1      Care Instructions    Start Celexa 10 mg daily for 1 week, if no side effects, nausea increase to 20 mg daily    Lorazepam 1 tablet twice daily as needed, try not to use it daily               Follow-ups after your visit        Who to contact     If you have questions or need follow up information about today's clinic visit or your schedule please contact [facility] directly at [phone].  Normal or non-critical lab and imaging results will be communicated to you by WISErghart, letter or phone within 4 business days after the clinic has received the results. If you do not hear from us within 7 days, please contact the clinic through WISErghart or phone. If you have a critical or abnormal lab result, we will notify you by phone as soon as possible.  Submit refill requests through Ioxus or call your pharmacy and they will forward the refill request to us. Please allow 3 business days for your refill to be completed.          Additional Information About Your Visit        MyChart Information     [employer] gives you secure access to your electronic health record. If you see a primary care provider, you can also send messages to your care team and make appointments. If you have questions, please call your primary care clinic.  If you do not have a primary care provider, please call [phone] and they will assist you.        Care EveryWhere ID     This is your Care EveryWhere ID. This could be used by other organizations to access your [facility] medical records  [id number]        Your Vitals Were     Pulse Temperature Height Pulse Oximetry BMI (Body Mass  "Index)       89 96.4  F (35.8  C) (Tympanic) 5' 4\" (1.626 m) 98% 25.75 kg/m2        Blood Pressure from Last 3 Encounters:   07/25/18 112/72   07/23/18 118/77   06/14/18 111/71    Weight from Last 3 Encounters:   07/25/18 150 lb (68 kg)   07/23/18 150 lb (68 kg)   06/14/18 149 lb 12.8 oz (67.9 kg)              Today, you had the following     No orders found for display         Today's Medication Changes          These changes are accurate as of 7/25/18  7:21 AM.  If you have any questions, ask your nurse or doctor.               Start taking these medicines.        Dose/Directions    citalopram 20 MG tablet   Commonly known as:  celeXA   Used for:  ARDEN (generalized anxiety disorder)   Started by:  Esther Pearson APRN CNP        Start 10 mg daily for 1 week then increase to 20 mg daily   Quantity:  30 tablet   Refills:  5         These medicines have changed or have updated prescriptions.        Dose/Directions    LORazepam 1 MG tablet   Commonly known as:  ATIVAN   This may have changed:    - when to take this  - reasons to take this   Used for:  ARDEN (generalized anxiety disorder)   Changed by:  Esther Pearson APRN CNP        Dose:  1 mg   Take 1 tablet (1 mg) by mouth 2 times daily as needed for anxiety   Quantity:  20 tablet   Refills:  0            Where to get your medicines      These medications were sent to [facility] [address]   [address] [facility] [address]     Phone:  [phone]     citalopram 20 MG tablet         Some of these will need a paper prescription and others can be bought over the counter.  Ask your nurse if you have questions.     Bring a paper prescription for each of these medications     LORazepam 1 MG tablet                Primary Care Provider Office Phone # Fax #    JUNG Ramos [phone] [address] [facility] [address]        Equal Access to Services     LILIANA TREVIZO AH: Hadii " suraj Arriaga, wamelissada luqadaha, qaybta kaalmada tom, waxcaleb idiin hayfuentesyessy mayesharrisontish [place] candy. So [facility] [phone].    ATENCIÓN: Si habla español, tiene a awad disposición servicios gratuitos de asistencia lingüística. Isidro al [phone].    We comply with applicable federal civil rights laws and [place] laws. We do not discriminate on the basis of race, color, national origin, age, disability, sex, sexual orientation, or gender identity.            Thank you!     Thank you for choosing [facility]  for your care. Our goal is always to provide you with excellent care. Hearing back from our patients is one way we can continue to improve our services. Please take a few minutes to complete the written survey that you may receive in the mail after your visit with us. Thank you!             Your Updated Medication List - Protect others around you: Learn how to safely use, store and throw away your medicines at www.disposemymeds.org.          This list is accurate as of 7/25/18  7:21 AM.  Always use your most recent med list.                   Brand Name Dispense Instructions for use Diagnosis    citalopram 20 MG tablet    celeXA    30 tablet    Start 10 mg daily for 1 week then increase to 20 mg daily    ARDEN (generalized anxiety disorder)       cyclobenzaprine 10 MG tablet    FLEXERIL    60 tablet    Take 0.5-1 tablets (5-10 mg) by mouth 3 times daily as needed for muscle spasms    Bilateral thoracic back pain, unspecified chronicity       LORazepam 1 MG tablet    ATIVAN    20 tablet    Take 1 tablet (1 mg) by mouth 2 times daily as needed for anxiety    ARDEN (generalized anxiety disorder)       PRILOSEC PO      Take 10 mg by mouth daily as needed

## 2018-07-25 NOTE — PATIENT INSTRUCTIONS
Start Celexa 10 mg daily for 1 week, if no side effects, nausea increase to 20 mg daily    Lorazepam 1 tablet twice daily as needed, try not to use it daily

## 2018-07-25 NOTE — PROGRESS NOTES
"  SUBJECTIVE:   Grace Dominguez is a 33 year old female who presents to clinic today for the following health issues: the patient with past history of anxiety presents complaining of worsening anxiety over the last 2 months. Was seen in ED recently due to panic attack, was prescribed Lorazepam for as needed use. The patient is interested in starting daily preventative medication for anxiety. She was on Zoloft before, but states it affected her sleep.     ED/ Followup:    Facility:  [facility]   Date of visit: 7/23/18   Reason for visit: ARDEN, panic attack   Current Status: Still having tightness in her chest, feels like she cant take a deep breath, hasnt been able to sleep for the past few weeks      Problem list and histories reviewed & adjusted, as indicated.  Additional history: as documented    Labs reviewed in EPIC    Reviewed and updated as needed this visit by clinical staff       Reviewed and updated as needed this visit by Provider         ROS:  Constitutional, HEENT, cardiovascular, pulmonary, gi and gu systems are negative, except as otherwise noted.    OBJECTIVE:     /72  Pulse 89  Temp 96.4  F (35.8  C) (Tympanic)  Ht 5' 4\" (1.626 m)  Wt 150 lb (68 kg)  SpO2 98%  BMI 25.75 kg/m2  Body mass index is 25.75 kg/(m^2).  GENERAL: healthy, alert and no distress  PSYCH: mentation appears normal, affect normal/bright and anxious    Diagnostic Test Results:  none     ASSESSMENT/PLAN:     1. ARDEN (generalized anxiety disorder)  - LORazepam (ATIVAN) 1 MG tablet; Take 1 tablet (1 mg) by mouth 2 times daily as needed for anxiety  Dispense: 20 tablet; Refill: 0  - citalopram (CELEXA) 20 MG tablet; Start 10 mg daily for 1 week then increase to 20 mg daily  Dispense: 30 tablet; Refill: 5  -follow up in 1-2 months if no improvement    See Patient Instructions    JUNG Mckeon [facility]  "

## 2018-07-26 ASSESSMENT — PATIENT HEALTH QUESTIONNAIRE - PHQ9: SUM OF ALL RESPONSES TO PHQ QUESTIONS 1-9: 9

## 2018-07-26 ASSESSMENT — ANXIETY QUESTIONNAIRES: GAD7 TOTAL SCORE: 10

## 2018-09-05 ENCOUNTER — HOSPITAL ENCOUNTER (EMERGENCY)
Facility: CLINIC | Age: 33
Discharge: HOME | End: 2018-09-06
Attending: FAMILY MEDICINE | Admitting: FAMILY MEDICINE

## 2018-09-05 DIAGNOSIS — G43.C0 PERIODIC HEADACHE SYNDROME, NOT INTRACTABLE: ICD-10-CM

## 2018-09-05 PROCEDURE — 25000128 H RX IP 250 OP 636: Performed by: FAMILY MEDICINE

## 2018-09-05 PROCEDURE — 99284 EMERGENCY DEPT VISIT MOD MDM: CPT | Mod: 25

## 2018-09-05 PROCEDURE — 96361 HYDRATE IV INFUSION ADD-ON: CPT

## 2018-09-05 PROCEDURE — 99284 EMERGENCY DEPT VISIT MOD MDM: CPT | Mod: Z6 | Performed by: FAMILY MEDICINE

## 2018-09-05 PROCEDURE — 96375 TX/PRO/DX INJ NEW DRUG ADDON: CPT

## 2018-09-05 PROCEDURE — 96374 THER/PROPH/DIAG INJ IV PUSH: CPT

## 2018-09-05 RX ORDER — DIPHENHYDRAMINE HYDROCHLORIDE 50 MG/ML
25 INJECTION INTRAMUSCULAR; INTRAVENOUS ONCE
Status: COMPLETED | OUTPATIENT
Start: 2018-09-05 | End: 2018-09-05

## 2018-09-05 RX ORDER — DEXAMETHASONE SODIUM PHOSPHATE 10 MG/ML
10 INJECTION, SOLUTION INTRAMUSCULAR; INTRAVENOUS ONCE
Status: COMPLETED | OUTPATIENT
Start: 2018-09-05 | End: 2018-09-05

## 2018-09-05 RX ORDER — KETOROLAC TROMETHAMINE 30 MG/ML
30 INJECTION, SOLUTION INTRAMUSCULAR; INTRAVENOUS ONCE
Status: COMPLETED | OUTPATIENT
Start: 2018-09-05 | End: 2018-09-05

## 2018-09-05 RX ADMIN — DEXAMETHASONE SODIUM PHOSPHATE 10 MG: 10 INJECTION, SOLUTION INTRAMUSCULAR; INTRAVENOUS at 22:36

## 2018-09-05 RX ADMIN — DIPHENHYDRAMINE HYDROCHLORIDE 25 MG: 50 INJECTION, SOLUTION INTRAMUSCULAR; INTRAVENOUS at 22:34

## 2018-09-05 RX ADMIN — SODIUM CHLORIDE 1000 ML: 9 INJECTION, SOLUTION INTRAVENOUS at 22:33

## 2018-09-05 RX ADMIN — PROCHLORPERAZINE EDISYLATE 10 MG: 5 INJECTION INTRAMUSCULAR; INTRAVENOUS at 22:36

## 2018-09-05 RX ADMIN — KETOROLAC TROMETHAMINE 30 MG: 30 INJECTION, SOLUTION INTRAMUSCULAR at 22:34

## 2018-09-05 NOTE — ED AVS SNAPSHOT
[facility] Emergency Department    [address] [address]    Phone:  [phone]    Fax:  [phone]                                       Grace Dominguez   MRN: [id number]    Department:  [facility] Emergency Department   Date of Visit:  9/5/2018           After Visit Summary Signature Page     I have received my discharge instructions, and my questions have been answered. I have discussed any challenges I see with this plan with the nurse or doctor.    ..........................................................................................................................................  Patient/Patient Representative Signature      ..........................................................................................................................................  Patient Representative Print Name and Relationship to Patient    ..................................................               ................................................  Date                                            Time    ..........................................................................................................................................  Reviewed by Signature/Title    ...................................................              ..............................................  Date                                                            Time          22EPIC Rev 08/18

## 2018-09-05 NOTE — ED AVS SNAPSHOT
[facility] Emergency Department    [address] [address]    Phone:  [phone]    Fax:  [phone]                                       Grace Dominguez   MRN: [id number]    Department:  [facility] Emergency Department   Date of Visit:  9/5/2018           Patient Information     Date Of Birth          1985        Your diagnoses for this visit were:     Periodic headache syndrome, not intractable        You were seen by Aaron Miller MD.        Discharge Instructions       Return to the Emergency Room if the following occurs:     Fever >101, vomiting/dehydration, or for any concern at anytime.    Or, follow-up with the following provider as we discussed:     Return to your primary doctor as needed.    Medications discussed:    None new.  No changes.    If you received pain-relieving or sedating medication during your time in the ER, avoid alcohol, driving automobiles, or working with machinery.  Also, a responsible adult must stay with you.        Call the Nurse Advice Line at [phone] or [phone] for any concern at anytime.      24 Hour Appointment Hotline       To make an appointment at any [facility] clinic, call [phone] ([phone]). If you don't have a family doctor or clinic, we will help you find one. [facility] clinics are conveniently located to serve the needs of you and your family.             Review of your medicines      Our records show that you are taking the medicines listed below. If these are incorrect, please call your family doctor or clinic.        Dose / Directions Last dose taken    citalopram 20 MG tablet   Commonly known as:  celeXA   Quantity:  30 tablet        Start 10 mg daily for 1 week then increase to 20 mg daily   Refills:  5        cyclobenzaprine 10 MG tablet   Commonly known as:  FLEXERIL   Dose:  5-10 mg   Quantity:  60 tablet        Take 0.5-1 tablets (5-10 mg) by mouth 3 times daily as needed for muscle spasms    Refills:  3        LORazepam 1 MG tablet   Commonly known as:  ATIVAN   Dose:  1 mg   Quantity:  20 tablet        Take 1 tablet (1 mg) by mouth 2 times daily as needed for anxiety   Refills:  0        PRILOSEC PO   Dose:  10 mg        Take 10 mg by mouth daily as needed   Refills:  0                Orders Needing Specimen Collection     None      Pending Results     No orders found for last 3 day(s).            Pending Culture Results     No orders found for last 3 day(s).            Pending Results Instructions     If you had any lab results that were not finalized at the time of your Discharge, you can call the ED Lab Result RN at [phone]. You will be contacted by this team for any positive Lab results or changes in treatment. The nurses are available 7 days a week from 10A to 6:30P.  You can leave a message 24 hours per day and they will return your call.        Test Results From Your Hospital Stay               Thank you for choosing [facility]       Thank you for choosing [facility] for your care. Our goal is always to provide you with excellent care. Hearing back from our patients is one way we can continue to improve our services. Please take a few minutes to complete the written survey that you may receive in the mail after you visit with us. Thank you!        [employer] Information     [employer] gives you secure access to your electronic health record. If you see a primary care provider, you can also send messages to your care team and make appointments. If you have questions, please call your primary care clinic.  If you do not have a primary care provider, please call [phone] and they will assist you.        Care EveryWhere ID     This is your Care EveryWhere ID. This could be used by other organizations to access your [facility] medical records  [id number]        Equal Access to Services     LILIANA TREVIZO : howard Shipman, cherelle landeros  mark [place] ah. So [facility] [phone].    ATENCIÓN: Si habla español, tiene a awad disposición servicios gratuitos de asistencia lingüística. Llame al [phone].    We comply with applicable federal civil rights laws and [place] laws. We do not discriminate on the basis of race, color, national origin, age, disability, sex, sexual orientation, or gender identity.            After Visit Summary       This is your record. Keep this with you and show to your community pharmacist(s) and doctor(s) at your next visit.

## 2018-09-06 VITALS
TEMPERATURE: 98.2 F | HEIGHT: 64 IN | DIASTOLIC BLOOD PRESSURE: 80 MMHG | OXYGEN SATURATION: 97 % | SYSTOLIC BLOOD PRESSURE: 104 MMHG | RESPIRATION RATE: 16 BRPM | HEART RATE: 60 BPM

## 2018-09-06 NOTE — ED PROVIDER NOTES
HPI  Patient is a 33-year-old female presenting with headache and nausea with vomiting.  She has a long history of migraine and experiences these on a frequent basis.  She was seen in the ER 2 days ago for the onset of headache and presumed dehydration.  She was given fluid and IV medication.  Other past medical history and medications reviewed.    The patient describes symptoms that are very consistent with prior migraine headaches but more severe.  She has pain primarily on the right side.  She has light sensitivity and has been nauseous and vomiting repeatedly.  She has visual symptoms which are amplified but similar to previous.  No fever.  No trauma or injury.  No meningismus described.  No skin rash.  No other concerning systemic symptoms described.    ROS: All other review of systems are negative other than that noted above.     Past Medical History:   Diagnosis Date     Abnormal Pap smear of cervix age 25    colposcopy normal and paps normal since     Chickenpox      History of blood transfusion 2005    After first      History of blood transfusion reaction     with her      History of colposcopy with cervical biopsy 16    visually normal, no bx taken, pt pregnant     Papanicolaou smear of cervix with low grade squamous intraepithelial lesion (LGSIL) 4/15/16    + HR HPV     PUPP (pruritic urticarial papules and plaques of pregnancy)      Past Surgical History:   Procedure Laterality Date     BIOPSY  10/17/2016    Skin biopsy due to terrible rash     C/SECTION, LOW TRANSVERSE      , Low Transverse      SECTION, TUBAL LIGATION, COMBINED N/A 10/13/2016    Procedure: COMBINED  SECTION, TUBAL LIGATION;  Surgeon: Nicole Pitts MD;  Location: [facility]     ESOPHAGOSCOPY, GASTROSCOPY, DUODENOSCOPY (EGD), COMBINED N/A 2017    Procedure: COMBINED ESOPHAGOSCOPY, GASTROSCOPY, DUODENOSCOPY (EGD);  gastroscopy;  Surgeon: Milton Haines MD;  Location:  "[facility]     LASER HOLMIUM LITHOTRIPSY URETER(S), INSERT STENT, COMBINED Right 2/1/2018    Procedure: COMBINED CYSTOSCOPY, URETEROSCOPY, LASER HOLMIUM LITHOTRIPSY URETER(S), INSERT STENT;  Cystoscopy, Right Ureteroscopy, Laser Lithotripsy, Stent Placement;  Surgeon: Kaiden Jefferson MD;  [facility]     MOUTH SURGERY      wisdom teeth     MOUTH SURGERY      as child     Family History   Problem Relation Age of Onset     Congenital Anomalies Daughter      hydrocephalus     Hypertension Maternal Grandmother      Other Cancer Maternal Grandfather      lung     Substance Abuse Maternal Grandfather      alcohol     Other Cancer Paternal Grandmother      lung     Depression Father      suicide     Depression Mother      Substance Abuse Mother      drugs- in treatment     Substance Abuse Paternal Grandfather      alcohol     Social History   Substance Use Topics     Smoking status: Former Smoker     Quit date: 12/13/2015     Smokeless tobacco: Never Used     Alcohol use No      Comment: 2 drinks weekly- quit with pregnancy         PHYSICAL  BP (!) 136/91  Pulse 60  Temp 98.2  F (36.8  C) (Oral)  Resp 16  Ht 1.626 m (5' 4\")  SpO2 97%  General: Patient is alert and in moderate to severe distress.  Darkened room.  Emesis bag near.  Neurological: Alert.  Moving upper and lower extremities equally, bilaterally.  Head / Neck: Atraumatic.  Ears: Not done.  Eyes: Pupils are equal, round, and reactive.  Normal conjunctiva.  Nose: Midline.  No epistaxis.  Mouth / Throat: No ulcerations or lesions.  Upper pharynx is not erythematous.  Moist.  Respiratory: No respiratory distress. CTA B.  Cardiovascular: Regular rhythm.  Peripheral extremities are warm.  No edema.  No calf tenderness.  Abdomen / Pelvis: Not tender.  No distention.  Soft throughout.  Genitalia: Not done.  Musculoskeletal: No tenderness over major muscles and joints.  Skin: No evidence of rash or trauma.        ED COURSE  2228.  The patient has symptoms " consistent with her migraine history but these are simply more severe and longer lasting than usual.  Toradol, Benadryl, Compazine, Decadron, fluid bolus ordered.  No emergent need for work up looking for another source of headache.    0027.  Patient reports nice improvement of pain.  She feels sleepy and would like to go home and rest.  No further workup required here.     Medications   0.9% sodium chloride BOLUS (1,000 mLs Intravenous New Bag 9/5/18 2233)   prochlorperazine (COMPAZINE) injection 10 mg (10 mg Intravenous Given 9/5/18 2236)   diphenhydrAMINE (BENADRYL) injection 25 mg (25 mg Intravenous Given 9/5/18 2234)   ketorolac (TORADOL) injection 30 mg (30 mg Intravenous Given 9/5/18 2234)   dexamethasone PF (DECADRON) injection 10 mg (10 mg Intravenous Given 9/5/18 2236)       IMPRESSION    ICD-10-CM    1. Periodic headache syndrome, not intractable G43.C0            Critical Care time:  none                    Aaron Miller MD  09/06/18 0028

## 2018-09-06 NOTE — DISCHARGE INSTRUCTIONS
Return to the Emergency Room if the following occurs:     Fever >101, vomiting/dehydration, or for any concern at anytime.    Or, follow-up with the following provider as we discussed:     Return to your primary doctor as needed.    Medications discussed:    None new.  No changes.    If you received pain-relieving or sedating medication during your time in the ER, avoid alcohol, driving automobiles, or working with machinery.  Also, a responsible adult must stay with you.        Call the Nurse Advice Line at [phone] or [phone] for any concern at anytime.

## 2018-09-06 NOTE — ED NOTES
"Pt presents with HA pain, mainly in forehead area, radiates to right side.  Pt has hx of migraines, this is worse than usual.  Pt has n/v.  HA since Monday.  Pt sees flashing in vision.  That is not her normal \"aura.\"  Pt is A&Ox4.  VSS.   S/o at bedside.    "

## 2018-10-03 ENCOUNTER — OFFICE VISIT (OUTPATIENT)
Dept: FAMILY MEDICINE | Facility: CLINIC | Age: 33
End: 2018-10-03

## 2018-10-03 VITALS
HEIGHT: 64 IN | WEIGHT: 150 LBS | SYSTOLIC BLOOD PRESSURE: 110 MMHG | HEART RATE: 86 BPM | DIASTOLIC BLOOD PRESSURE: 72 MMHG | BODY MASS INDEX: 25.61 KG/M2 | OXYGEN SATURATION: 97 % | TEMPERATURE: 96.5 F

## 2018-10-03 DIAGNOSIS — L30.9 DERMATITIS: Primary | ICD-10-CM

## 2018-10-03 DIAGNOSIS — F41.1 GAD (GENERALIZED ANXIETY DISORDER): ICD-10-CM

## 2018-10-03 PROCEDURE — 99213 OFFICE O/P EST LOW 20 MIN: CPT | Performed by: NURSE PRACTITIONER

## 2018-10-03 RX ORDER — TRIAMCINOLONE ACETONIDE 1 MG/G
CREAM TOPICAL
Qty: 80 G | Refills: 0 | Status: SHIPPED | OUTPATIENT
Start: 2018-10-03 | End: 2019-08-18

## 2018-10-03 RX ORDER — LORAZEPAM 1 MG/1
1 TABLET ORAL 2 TIMES DAILY PRN
Qty: 20 TABLET | Refills: 0 | Status: SHIPPED | OUTPATIENT
Start: 2018-10-03 | End: 2018-10-03

## 2018-10-03 NOTE — MR AVS SNAPSHOT
"              After Visit Summary   10/3/2018    Grace Dominguez    MRN: [id number]           Patient Information     Date Of Birth          1985        Visit Information        Provider Department      10/3/2018 6:40 AM Esther Pearson APRN CNP [facility]        Today's Diagnoses     Dermatitis    -  1      Care Instructions    Irritant contact dermatitis?    Try Kenalog cream 3 times daily for up to 2 weeks       Let me know if its not getting any better           Follow-ups after your visit        Who to contact     If you have questions or need follow up information about today's clinic visit or your schedule please contact [facility] directly at [phone].  Normal or non-critical lab and imaging results will be communicated to you by MyChart, letter or phone within 4 business days after the clinic has received the results. If you do not hear from us within 7 days, please contact the clinic through [employer] or phone. If you have a critical or abnormal lab result, we will notify you by phone as soon as possible.  Submit refill requests through [employer] or call your pharmacy and they will forward the refill request to us. Please allow 3 business days for your refill to be completed.          Additional Information About Your Visit        MyChart Information     [employer] gives you secure access to your electronic health record. If you see a primary care provider, you can also send messages to your care team and make appointments. If you have questions, please call your primary care clinic.  If you do not have a primary care provider, please call [phone] and they will assist you.        Care EveryWhere ID     This is your Care EveryWhere ID. This could be used by other organizations to access your [facility] medical records  [id number]        Your Vitals Were     Pulse Temperature Height Pulse Oximetry BMI (Body Mass Index)       86 96.5  F (35.8  C) (Tympanic) 5' 4\" " (1.626 m) 97% 25.75 kg/m2        Blood Pressure from Last 3 Encounters:   10/03/18 110/72   09/06/18 104/80   07/25/18 112/72    Weight from Last 3 Encounters:   10/03/18 150 lb (68 kg)   07/25/18 150 lb (68 kg)   07/23/18 150 lb (68 kg)              Today, you had the following     No orders found for display         Today's Medication Changes          These changes are accurate as of 10/3/18  6:58 AM.  If you have any questions, ask your nurse or doctor.               Start taking these medicines.        Dose/Directions    triamcinolone 0.1 % cream   Commonly known as:  KENALOG   Used for:  Dermatitis   Started by:  Esther Pearson APRN CNP        Apply sparingly to affected area three times daily as needed for up to 14 days   Quantity:  80 g   Refills:  0            Where to get your medicines      These medications were sent to [facility] [address] [place] [address] [address]   [address] [facility] [address]     Phone:  [phone]     triamcinolone 0.1 % cream                Primary Care Provider Office Phone # Fax #    JUNG Ramos [phone] [address] [facility] [address]        Equal Access to Services     LILIANA TREVIZO AH: Hadii suraj rockwello Sodaisy, waaxda luqadaha, qaybta kaalmada adeegyada, cherelle ely haynicole gupta. So [facility] [phone].    ATENCIÓN: Si habla español, tiene a awad disposición servicios gratuitos de asistencia lingüística. Llame al [phone].    We comply with applicable federal civil rights laws and [place] laws. We do not discriminate on the basis of race, color, national origin, age, disability, sex, sexual orientation, or gender identity.            Thank you!     Thank you for choosing [facility]  for your care. Our goal is always to provide you with excellent care. Hearing back from our patients is one way we can continue to improve our services. Please take a few minutes to  complete the written survey that you may receive in the mail after your visit with us. Thank you!             Your Updated Medication List - Protect others around you: Learn how to safely use, store and throw away your medicines at www.disposemymeds.org.          This list is accurate as of 10/3/18  6:58 AM.  Always use your most recent med list.                   Brand Name Dispense Instructions for use Diagnosis    citalopram 20 MG tablet    celeXA    30 tablet    Start 10 mg daily for 1 week then increase to 20 mg daily    ARDEN (generalized anxiety disorder)       LORazepam 1 MG tablet    ATIVAN    20 tablet    Take 1 tablet (1 mg) by mouth 2 times daily as needed for anxiety    ARDEN (generalized anxiety disorder)       PRILOSEC PO      Take 10 mg by mouth daily as needed        triamcinolone 0.1 % cream    KENALOG    80 g    Apply sparingly to affected area three times daily as needed for up to 14 days    Dermatitis

## 2018-10-03 NOTE — PATIENT INSTRUCTIONS
Irritant contact dermatitis?    Try Kenalog cream 3 times daily for up to 2 weeks       Let me know if its not getting any better

## 2018-10-03 NOTE — PROGRESS NOTES
"  SUBJECTIVE:   Grace Dominguez is a 33 year old female who presents to clinic today for the following health issues:    Rash  Onset: 2 weeks     Description:   Location: Both legs, both arms   Character: round, red spots, some are raised  Itching (Pruritis): no     Progression of Symptoms:  Worsening started out as a couple have spread,  her left upper thigh is the worse     Accompanying Signs & Symptoms:  Fever: no   Body aches or joint pain: no   Sore throat symptoms: no   Recent cold symptoms: no     History:   Previous similar rash: no     Precipitating factors:   Exposure to similar rash: no   New exposures: None   Recent travel: no     Alleviating factors:  None     Therapies Tried and outcome: none     Problem list and histories reviewed & adjusted, as indicated.  Additional history: as documented    Labs reviewed in EPIC    Reviewed and updated as needed this visit by clinical staff       Reviewed and updated as needed this visit by Provider         ROS:  Constitutional, HEENT, cardiovascular, pulmonary, gi and gu systems are negative, except as otherwise noted.    OBJECTIVE:     /72  Pulse 86  Temp 96.5  F (35.8  C) (Tympanic)  Ht 5' 4\" (1.626 m)  Wt 150 lb (68 kg)  SpO2 97%  BMI 25.75 kg/m2  Body mass index is 25.75 kg/(m^2).  GENERAL: healthy, alert and no distress  SKIN: few small pale erythematous patches with scaling on bilateral lower legs, thighs and arms    Diagnostic Test Results:  none     ASSESSMENT/PLAN:     1. Dermatitis  -possible irritant contact dermatitis?  - triamcinolone (KENALOG) 0.1 % cream; Apply sparingly to affected area three times daily as needed for up to 14 days  Dispense: 80 g; Refill: 0  -can also try Gold Bond cream  -if no improvement will consider short course with oral steroids     See Patient Instructions    JUNG Mckeon [facility]  "

## 2018-10-30 ENCOUNTER — MYC MEDICAL ADVICE (OUTPATIENT)
Dept: FAMILY MEDICINE | Facility: CLINIC | Age: 33
End: 2018-10-30

## 2018-10-30 DIAGNOSIS — L30.9 DERMATITIS: Primary | ICD-10-CM

## 2018-10-31 RX ORDER — PREDNISONE 20 MG/1
20 TABLET ORAL 2 TIMES DAILY
Qty: 10 TABLET | Refills: 0 | Status: SHIPPED | OUTPATIENT
Start: 2018-10-31 | End: 2019-02-19

## 2018-11-26 ENCOUNTER — MYC MEDICAL ADVICE (OUTPATIENT)
Dept: FAMILY MEDICINE | Facility: CLINIC | Age: 33
End: 2018-11-26

## 2019-01-17 ENCOUNTER — OFFICE VISIT (OUTPATIENT)
Dept: OBGYN | Facility: CLINIC | Age: 34
End: 2019-01-17
Payer: COMMERCIAL

## 2019-01-17 VITALS
TEMPERATURE: 97.9 F | DIASTOLIC BLOOD PRESSURE: 58 MMHG | HEIGHT: 64 IN | BODY MASS INDEX: 27.14 KG/M2 | RESPIRATION RATE: 16 BRPM | SYSTOLIC BLOOD PRESSURE: 106 MMHG | WEIGHT: 159 LBS | HEART RATE: 69 BPM

## 2019-01-17 DIAGNOSIS — Z01.419 ENCOUNTER FOR GYNECOLOGICAL EXAMINATION WITHOUT ABNORMAL FINDING: Primary | ICD-10-CM

## 2019-01-17 PROCEDURE — 99212 OFFICE O/P EST SF 10 MIN: CPT | Performed by: OBSTETRICS & GYNECOLOGY

## 2019-01-17 PROCEDURE — G0145 SCR C/V CYTO,THINLAYER,RESCR: HCPCS | Performed by: OBSTETRICS & GYNECOLOGY

## 2019-01-17 PROCEDURE — 87624 HPV HI-RISK TYP POOLED RSLT: CPT | Performed by: OBSTETRICS & GYNECOLOGY

## 2019-01-17 RX ORDER — CITALOPRAM HYDROBROMIDE 20 MG/1
20 TABLET ORAL DAILY
Qty: 30 TABLET | Refills: 5 | Status: SHIPPED | OUTPATIENT
Start: 2019-01-17 | End: 2019-08-26

## 2019-01-17 ASSESSMENT — MIFFLIN-ST. JEOR: SCORE: 1421.22

## 2019-01-17 NOTE — PROGRESS NOTES
"Grace is a 33 year old  here for follow up of pap testing.  Planning on being an egg donor.  No new concerns.    ROS: Ten point review of systems was reviewed and negative except the above.    Gyne: - abn pap (last pap ), - STD's    PMH: Her past medical, surgical, and obstetric histories were reviewed and are documented in their appropriate chart areas.    ALL/Meds: Her medication and allergy histories were reviewed and are documented in their appropriate chart areas.    Social History     Tobacco Use     Smoking status: Former Smoker     Last attempt to quit: 2015     Years since quitting: 3.0     Smokeless tobacco: Never Used   Substance Use Topics     Alcohol use: No     Alcohol/week: 0.0 oz     Comment: 2 drinks weekly- quit with pregnancy     Drug use: No        PE: /58 (BP Location: Right arm, Patient Position: Chair, Cuff Size: Adult Regular)   Pulse 69   Temp 97.9  F (36.6  C) (Tympanic)   Resp 16   Ht 1.626 m (5' 4\")   Wt 72.1 kg (159 lb)   Breastfeeding? No   BMI 27.29 kg/m      General Appearance:  healthy, alert, active, no distress  HEENT: NCAT  Abdomen: Soft, nontender.  Normal bowel sounds.  No masses  Pelvic exam: normal vagina and vulva, normal cervix without lesions or tenderness, uterus normal size anteverted, adenxa normal in size without tenderness, pap smear done.    A/P 33 year old  here for     ICD-10-CM    1. Encounter for gynecological examination without abnormal finding Z01.419 Pap imaged thin layer screen with HPV - recommended age 30 - 65 years (select HPV order below)     citalopram (CELEXA) 20 MG tablet     HPV High Risk Types DNA Cervical        1. Pap done.  Declines STD screening.  IUD strings visible and appropriate    Faith Miguel M.D.     "

## 2019-01-17 NOTE — NURSING NOTE
"Initial /58 (BP Location: Right arm, Patient Position: Chair, Cuff Size: Adult Regular)   Pulse 69   Temp 97.9  F (36.6  C) (Tympanic)   Resp 16   Ht 1.626 m (5' 4\")   Wt 72.1 kg (159 lb)   Breastfeeding? No   BMI 27.29 kg/m   Estimated body mass index is 27.29 kg/m  as calculated from the following:    Height as of this encounter: 1.626 m (5' 4\").    Weight as of this encounter: 72.1 kg (159 lb). .    Amy Purcell CMA    "

## 2019-01-21 LAB
COPATH REPORT: NORMAL
PAP: NORMAL

## 2019-01-23 LAB
FINAL DIAGNOSIS: NORMAL
HPV HR 12 DNA CVX QL NAA+PROBE: NEGATIVE
HPV16 DNA SPEC QL NAA+PROBE: NEGATIVE
HPV18 DNA SPEC QL NAA+PROBE: NEGATIVE
SPECIMEN DESCRIPTION: NORMAL
SPECIMEN SOURCE CVX/VAG CYTO: NORMAL

## 2019-02-04 ENCOUNTER — HOSPITAL ENCOUNTER (EMERGENCY)
Facility: CLINIC | Age: 34
Discharge: HOME | End: 2019-02-04
Attending: PHYSICIAN ASSISTANT | Admitting: PHYSICIAN ASSISTANT
Payer: COMMERCIAL

## 2019-02-04 VITALS
OXYGEN SATURATION: 100 % | HEART RATE: 53 BPM | DIASTOLIC BLOOD PRESSURE: 80 MMHG | TEMPERATURE: 98.4 F | SYSTOLIC BLOOD PRESSURE: 125 MMHG | RESPIRATION RATE: 20 BRPM

## 2019-02-04 DIAGNOSIS — J06.9 VIRAL URI WITH COUGH: ICD-10-CM

## 2019-02-04 LAB
INTERNAL QC OK POCT: YES
S PYO AG THROAT QL IA.RAPID: NEGATIVE

## 2019-02-04 PROCEDURE — 87081 CULTURE SCREEN ONLY: CPT | Performed by: PHYSICIAN ASSISTANT

## 2019-02-04 PROCEDURE — G0463 HOSPITAL OUTPT CLINIC VISIT: HCPCS

## 2019-02-04 PROCEDURE — 87880 STREP A ASSAY W/OPTIC: CPT | Performed by: PHYSICIAN ASSISTANT

## 2019-02-04 PROCEDURE — 99213 OFFICE O/P EST LOW 20 MIN: CPT | Mod: Z6 | Performed by: PHYSICIAN ASSISTANT

## 2019-02-04 RX ORDER — BENZONATATE 100 MG/1
100-200 CAPSULE ORAL 3 TIMES DAILY PRN
Qty: 42 CAPSULE | Refills: 0 | Status: SHIPPED | OUTPATIENT
Start: 2019-02-04 | End: 2019-03-28

## 2019-02-04 NOTE — ED AVS SNAPSHOT
[facility] Emergency Department  [address] [facility] [address]  Phone:  [phone]  Fax:  [phone]                                    Grace Dominguez   MRN: [id number]    Department:  [facility] Emergency Department   Date of Visit:  2/4/2019           After Visit Summary Signature Page    I have received my discharge instructions, and my questions have been answered. I have discussed any challenges I see with this plan with the nurse or doctor.    ..........................................................................................................................................  Patient/Patient Representative Signature      ..........................................................................................................................................  Patient Representative Print Name and Relationship to Patient    ..................................................               ................................................  Date                                   Time    ..........................................................................................................................................  Reviewed by Signature/Title    ...................................................              ..............................................  Date                                               Time          22EPIC Rev 08/18

## 2019-02-04 NOTE — ED PROVIDER NOTES
History     Chief Complaint   Patient presents with     URI     HPI  Grace Dominguez is a 33 year old female who presents to the urgent care with concern over 4-day history of sore throat accompanied by minimal nasal congestion, bilateral ear pain, cough and chest congestion.  She has also had an episode of post-tussive emesis.  She denies any objective fever however has had some chills, myalgias.  She denies any shortness of breath, wheezing, nausea, diarrhea or abdominal pain.  She has attempted to treat with numerous OTC medications including DayQuil, NyQuil, ibuprofen, Robitussin without relief.  She denies any prior history of asthma, COPD or other breathing related disorders.    Allergies:  No Known Allergies    Problem List:    Patient Active Problem List    Diagnosis Date Noted     ARDEN (generalized anxiety disorder) 12/26/2016     Priority: Medium     Stable on celexa       mirena IUD 11/28/2016     Priority: Medium     Mirena IUD placed today 11/28/16 by Faith Miguel MD  LOT# [id number]  Exp: 08/19         Pityriasis rosea 10/14/2016     Priority: Medium     Family history of congenital anomaly 04/15/2016     Priority: Medium     Daughter had hydrocephalus and developmental delay related to infection       Mild dysplasia of cervix 04/15/2016     Priority: Medium     10/14/09 LSIL  2/23/10 [place]: BRITTNY 1.   4/15/16 LSIL/ + HR HPV @ age 31. 12w1d pregnant. Plan colp   5/13/16: [place] visually normal, no biopsies taken. Plan cotest at 1 year.   12/20/16:Pap--NIL, neg HPV. Plan: pap and HPV due in 3 years.   1/17/19 NIL pap, Neg HPV. Plan: 3 year pap/routine screening          Past Medical History:    Past Medical History:   Diagnosis Date     Abnormal Pap smear of cervix age 25     Chickenpox      History of blood transfusion 5/23/2005     History of blood transfusion reaction      History of colposcopy with cervical biopsy 5/13/16     Kidney stones      Papanicolaou smear of cervix with low grade squamous  intraepithelial lesion (LGSIL) 4/15/16     PUPP (pruritic urticarial papules and plaques of pregnancy)        Past Surgical History:    Past Surgical History:   Procedure Laterality Date     BIOPSY  10/17/2016    Skin biopsy due to terrible rash     C/SECTION, LOW TRANSVERSE      , Low Transverse      SECTION, TUBAL LIGATION, COMBINED N/A 10/13/2016    Procedure: COMBINED  SECTION, TUBAL LIGATION;  Surgeon: Nicole Pitts MD;  Location: [facility]     ESOPHAGOSCOPY, GASTROSCOPY, DUODENOSCOPY (EGD), COMBINED N/A 2017    Procedure: COMBINED ESOPHAGOSCOPY, GASTROSCOPY, DUODENOSCOPY (EGD);  gastroscopy;  Surgeon: Milton Haines MD;  Location: [facility]     LASER HOLMIUM LITHOTRIPSY URETER(S), INSERT STENT, COMBINED Right 2018    Procedure: COMBINED CYSTOSCOPY, URETEROSCOPY, LASER HOLMIUM LITHOTRIPSY URETER(S), INSERT STENT;  Cystoscopy, Right Ureteroscopy, Laser Lithotripsy, Stent Placement;  Surgeon: Kaiden Jefferson MD;  Location:  OR     MOUTH SURGERY      wisdom teeth     MOUTH SURGERY      as child       Family History:    Family History   Problem Relation Age of Onset     Congenital Anomalies Daughter         hydrocephalus     Hypertension Maternal Grandmother      Other Cancer Maternal Grandfather         lung     Substance Abuse Maternal Grandfather         alcohol     Other Cancer Paternal Grandmother         lung     Depression Father         suicide     Depression Mother      Substance Abuse Mother         drugs- in treatment     Substance Abuse Paternal Grandfather         alcohol     Social History:  Marital Status:   [2]  Social History     Tobacco Use     Smoking status: Former Smoker     Last attempt to quit: 2015     Years since quitting: 3.1     Smokeless tobacco: Never Used   Substance Use Topics     Alcohol use: No     Alcohol/week: 0.0 oz     Comment: 2 drinks weekly- quit with pregnancy     Drug use: No      Medications:      citalopram  (CELEXA) 20 MG tablet   levonorgestrel (MIRENA) 20 MCG/24HR IUD   Omeprazole (PRILOSEC PO)   predniSONE (DELTASONE) 20 MG tablet   triamcinolone (KENALOG) 0.1 % cream     Review of Systems  CONSTITUTIONAL:POSITIVE  for chills and myalgias and NEGATIVE  for objective fever   INTEGUMENTARY/SKIN: NEGATIVE for worrisome rashes, moles or lesions  EYES: NEGATIVE for vision changes or irritation  ENT/MOUTH: POSITIVE for sore throat, bilateral ear pain, minimal nasal congestion  RESP:POSITIVE for cough NEGATIVE for shortness of breath, wheezing   GI: POSITIVE for posttussive emesis and NEGATIVE for nausea, diarrhea, abdominal pain   Physical Exam   BP: 125/80  Pulse: 53  Temp: 98.4  F (36.9  C)  Resp: 20  SpO2: 100 %  Physical Exam  GENERAL APPEARANCE: healthy, alert and no distress  EYES: EOMI,  PERRL, conjunctiva clear  HENT: ear canals and TM's normal.  Some nasal discharge is present.  Posterior pharynx is nonerythematous and nonedematous without exudate.   NECK: supple, nontender, no lymphadenopathy  RESP: lungs clear to auscultation - no rales, rhonchi or wheezes  CV: regular rates and rhythm, normal S1 S2, no murmur noted  ABDOMEN:  soft, nontender, no HSM or masses and bowel sounds normal  SKIN: no suspicious lesions or rashes  ED Course        Procedures        Critical Care time:  none        Results for orders placed or performed during the hospital encounter of 02/04/19   Rapid strep group A screen POCT   Result Value Ref Range    Rapid Strep A Screen negative neg    Internal QC OK Yes    Beta strep group A r/o culture   Result Value Ref Range    Specimen Description Throat     Special Requests Specimen collected in eSwab transport (white cap)     Culture Micro No Beta Streptococcus isolated      Medications - No data to display    Assessments & Plan (with Medical Decision Making)     I have reviewed the nursing notes.    I have reviewed the findings, diagnosis, plan and need for follow up with the patient.           Medication List      Started    benzonatate 100 MG capsule  Commonly known as:  TESSALON  100-200 mg, Oral, 3 TIMES DAILY PRN          Final diagnoses:   Viral URI with cough     33-year-old female presents to the urgent care with concern over 4-day history of sore throat accompanied by nasal congestion, cough, bilateral ear pain.  She had stable vital signs upon arrival.  Physical exam findings as described above.  As part of evaluation she had rapid strep test which was negative for strep throat.  Symptoms are most consistent with viral URI.  I do not suspect bronchitis, bacterial sinusitis, pneumonia, pertussis, PE and will defer further evaluation.  She was discharged home stable with prescription for Tessalon to be used as needed to control cough.  Follow-up with primary care provider if no improvement within the next 5-7 days.  Worrisome reasons to return to the ER/UC sooner discussed.      Disclaimer: This note consists of symbols derived from keyboarding, dictation, and/or voice recognition software. As a result, there may be errors in the script that have gone undetected.  Please consider this when interpreting information found in the chart.    2/4/2019   [facility] EMERGENCY DEPARTMENT     Kathy Gutierrez PA-C  02/08/19 1152

## 2019-02-06 LAB
BACTERIA SPEC CULT: NORMAL
Lab: NORMAL
SPECIMEN SOURCE: NORMAL

## 2019-02-19 ENCOUNTER — OFFICE VISIT (OUTPATIENT)
Dept: FAMILY MEDICINE | Facility: CLINIC | Age: 34
End: 2019-02-19
Payer: COMMERCIAL

## 2019-02-19 VITALS
SYSTOLIC BLOOD PRESSURE: 106 MMHG | WEIGHT: 166 LBS | DIASTOLIC BLOOD PRESSURE: 68 MMHG | RESPIRATION RATE: 10 BRPM | OXYGEN SATURATION: 97 % | HEART RATE: 72 BPM | TEMPERATURE: 97.7 F | BODY MASS INDEX: 28.49 KG/M2

## 2019-02-19 DIAGNOSIS — J01.90 ACUTE SINUSITIS WITH SYMPTOMS > 10 DAYS: Primary | ICD-10-CM

## 2019-02-19 PROCEDURE — 99213 OFFICE O/P EST LOW 20 MIN: CPT | Performed by: INTERNAL MEDICINE

## 2019-02-19 NOTE — PROGRESS NOTES
SUBJECTIVE:   Grace Dominguez is a 33 year old female who presents to clinic today for the following health issues:      ED/UC Followup:    Facility:  [facility]  Date of visit: 2/4/19  Reason for visit: Viral URI  Current Status: Still bad       Acute Illness   Acute illness concerns: URI  Onset: Follow up E.D. above    Fever: no    Chills/Sweats: YES- both    Headache (location?): YES    Sinus Pressure:YES    Conjunctivitis:  YES: both    Ear Pain: YES: both    Rhinorrhea: YES    Congestion: YES    Sore Throat: YES     Cough: YES    Wheeze: no    Decreased Appetite: no    Nausea: no    Vomiting: no    Diarrhea:  no    Dysuria/Freq.: no    Fatigue/Achiness: YES    Sick/Strep Exposure: no     Therapies Tried and outcome: tessalon        Seen in ER 2/4 with 4 day history of soret throat, nasal congestion, bilateral ear pain, cough, chest congestion, post-tussive emesis, chills, myalgias.  Exam and vitals normal. Strep negative.  Given tessalon not much help    Symptoms are mostly sinus pressure, nasal congestion, generalized myalgias.  Subjective fevers/chills.  Had double worsening 1 week ago.    Current Outpatient Medications   Medication Sig Dispense Refill     citalopram (CELEXA) 20 MG tablet Take 1 tablet (20 mg) by mouth daily 30 tablet 5     levonorgestrel (MIRENA) 20 MCG/24HR IUD 1 each by Intrauterine route once       Omeprazole (PRILOSEC PO) Take 10 mg by mouth daily as needed        triamcinolone (KENALOG) 0.1 % cream Apply sparingly to affected area three times daily as needed for up to 14 days 80 g 0     benzonatate (TESSALON) 100 MG capsule Take 1-2 capsules (100-200 mg) by mouth 3 times daily as needed (Patient not taking: Reported on 2/19/2019) 42 capsule 0       Reviewed and updated as needed this visit by clinical staff  Tobacco  Allergies  Meds  Med Hx  Surg Hx  Fam Hx  Soc Hx      Reviewed and updated as needed this visit by Provider         ROS:  Constitutional, HEENT,  cardiovascular, pulmonary, gi and gu systems are negative, except as otherwise noted.    OBJECTIVE:     /68   Pulse 72   Temp 97.7  F (36.5  C) (Tympanic)   Resp 10   Wt 75.3 kg (166 lb)   SpO2 97%   Breastfeeding? No   BMI 28.49 kg/m    Body mass index is 28.49 kg/m .  GENERAL APPEARANCE: alert, no distress and fatigued  EYES: Eyes grossly normal to inspection, PERRL and conjunctivae and sclerae normal  HENT: ear canals and TM's normal, nose and mouth without ulcers or lesions and maxillary sinus tenderness bilateral  NECK: no adenopathy, no asymmetry, masses, or scars and thyroid normal to palpation  RESP: lungs clear to auscultation - no rales, rhonchi or wheezes  CV: regular rates and rhythm, normal S1 S2, no S3 or S4 and no murmur, click or rub      ASSESSMENT/PLAN:       ICD-10-CM    1. Acute sinusitis with symptoms > 10 days J01.90 amoxicillin-clavulanate (AUGMENTIN) 875-125 MG tablet     Antibiotic treatment warranted due to ongoing severe symptoms and duration of illness        Macy Vu, [facility]

## 2019-02-19 NOTE — PATIENT INSTRUCTIONS
Patient Education     Sinusitis (Antibiotic Treatment)    The sinuses are air-filled spaces within the bones of the face. They connect to the inside of the nose. Sinusitis is an inflammation of the tissue that lines the sinuses. Sinusitis can occur during a cold. It can also happen due to allergies to pollens and other particles in the air. Sinusitis can cause symptoms of sinus congestion and a feeling of fullness. A sinus infection causes fever, headache, and facial pain. There is often green or yellow fluid draining from the nose or into the back of the throat (post-nasal drip). You have been given antibiotics to treat this condition.  Home care    Take the full course of antibiotics as instructed. Do not stop taking them, even when you feel better.    Drink plenty of water, hot tea, and other liquids. This may help thin nasal mucus. It also may help your sinuses drain fluids.    Heat may help soothe painful areas of your face. Use a towel soaked in hot water. Or,  the shower and direct the warm spray onto your face. Using a vaporizer along with a menthol rub at night may also help soothe symptoms.     An expectorant with guaifenesin may help thin nasal mucus and help your sinuses drain fluids.    You can use an over-the-counter decongestant, unless a similar medicine was prescribed to you. Nasal sprays work the fastest. Use one that contains phenylephrine or oxymetazoline. First blow your nose gently. Then use the spray. Do not use these medicines more often than directed on the label. If you do, your symptoms may get worse. You may also take pills that contain pseudoephedrine. Don t use products that combine multiple medicines. This is because side effects may be increased. Read labels. You can also ask the pharmacist for help. (People with high blood pressure should not use decongestants. They can raise blood pressure.)    Over-the-counter antihistamines may help if allergies contributed to your  sinusitis.      Do not use nasal rinses or irrigation during an acute sinus infection, unless your healthcare provider tells you to. Rinsing may spread the infection to other areas in your sinuses.    Use acetaminophen or ibuprofen to control pain, unless another pain medicine was prescribed to you. If you have chronic liver or kidney disease or ever had a stomach ulcer, talk with your healthcare provider before using these medicines. (Aspirin should never be taken by anyone under age 18 who is ill with a fever. It may cause severe liver damage.)    Don't smoke. This can make symptoms worse.  Follow-up care  Follow up with your healthcare provider or our staff if you are better in 1 week.  When to seek medical advice  Call your healthcare provider if any of these occur:    Facial pain or headache that gets worse    Stiff neck    Unusual drowsiness or confusion    Swelling of your forehead or eyelids    Vision problems, such as blurred or double vision    Fever of 100.4 F (38 C) or higher, or as directed by your healthcare provider    Seizure    Breathing problems    Symptoms don't go away in 10 days  Prevention  Here are steps you can take to help prevent an infection:    Keep good hand washing habits.    Don t have close contact with people who have sore throats, colds, or other upper respiratory infections.    Don t smoke, and stay away from secondhand smoke.    Stay up to date with of your vaccines.  Date Last Reviewed: 11/1/2017 2000-2018 [employer]. [address] [place] [address], [place] [address]. All rights reserved. This information is not intended as a substitute for professional medical care. Always follow your healthcare professional's instructions.

## 2019-03-28 ENCOUNTER — APPOINTMENT (OUTPATIENT)
Dept: CT IMAGING | Facility: CLINIC | Age: 34
End: 2019-03-28
Attending: STUDENT IN AN ORGANIZED HEALTH CARE EDUCATION/TRAINING PROGRAM
Payer: COMMERCIAL

## 2019-03-28 ENCOUNTER — HOSPITAL ENCOUNTER (EMERGENCY)
Facility: CLINIC | Age: 34
Discharge: HOME | End: 2019-03-28
Attending: STUDENT IN AN ORGANIZED HEALTH CARE EDUCATION/TRAINING PROGRAM | Admitting: STUDENT IN AN ORGANIZED HEALTH CARE EDUCATION/TRAINING PROGRAM
Payer: COMMERCIAL

## 2019-03-28 VITALS
DIASTOLIC BLOOD PRESSURE: 50 MMHG | BODY MASS INDEX: 26.46 KG/M2 | HEIGHT: 64 IN | OXYGEN SATURATION: 97 % | TEMPERATURE: 98 F | WEIGHT: 155 LBS | SYSTOLIC BLOOD PRESSURE: 103 MMHG | RESPIRATION RATE: 16 BRPM | HEART RATE: 88 BPM

## 2019-03-28 DIAGNOSIS — R55 SYNCOPE, UNSPECIFIED SYNCOPE TYPE: ICD-10-CM

## 2019-03-28 DIAGNOSIS — S09.90XA CLOSED HEAD INJURY, INITIAL ENCOUNTER: ICD-10-CM

## 2019-03-28 LAB
ALBUMIN SERPL-MCNC: 3.6 G/DL (ref 3.4–5)
ALP SERPL-CCNC: 56 U/L (ref 40–150)
ALT SERPL W P-5'-P-CCNC: 19 U/L (ref 0–50)
ANION GAP SERPL CALCULATED.3IONS-SCNC: 8 MMOL/L (ref 3–14)
AST SERPL W P-5'-P-CCNC: 10 U/L (ref 0–45)
BASOPHILS # BLD AUTO: 0 10E9/L (ref 0–0.2)
BASOPHILS NFR BLD AUTO: 0.3 %
BILIRUB SERPL-MCNC: 0.2 MG/DL (ref 0.2–1.3)
BUN SERPL-MCNC: 14 MG/DL (ref 7–30)
CALCIUM SERPL-MCNC: 8.4 MG/DL (ref 8.5–10.1)
CHLORIDE SERPL-SCNC: 108 MMOL/L (ref 94–109)
CO2 SERPL-SCNC: 25 MMOL/L (ref 20–32)
CREAT SERPL-MCNC: 0.69 MG/DL (ref 0.52–1.04)
DIFFERENTIAL METHOD BLD: NORMAL
EOSINOPHIL # BLD AUTO: 0.1 10E9/L (ref 0–0.7)
EOSINOPHIL NFR BLD AUTO: 1.4 %
ERYTHROCYTE [DISTWIDTH] IN BLOOD BY AUTOMATED COUNT: 11.9 % (ref 10–15)
GFR SERPL CREATININE-BSD FRML MDRD: >90 ML/MIN/{1.73_M2}
GLUCOSE SERPL-MCNC: 121 MG/DL (ref 70–99)
HCG SERPL QL: NEGATIVE
HCT VFR BLD AUTO: 42.8 % (ref 35–47)
HGB BLD-MCNC: 14.2 G/DL (ref 11.7–15.7)
IMM GRANULOCYTES # BLD: 0.1 10E9/L (ref 0–0.4)
IMM GRANULOCYTES NFR BLD: 0.7 %
LYMPHOCYTES # BLD AUTO: 2.1 10E9/L (ref 0.8–5.3)
LYMPHOCYTES NFR BLD AUTO: 21.1 %
MCH RBC QN AUTO: 29 PG (ref 26.5–33)
MCHC RBC AUTO-ENTMCNC: 33.2 G/DL (ref 31.5–36.5)
MCV RBC AUTO: 87 FL (ref 78–100)
MONOCYTES # BLD AUTO: 0.4 10E9/L (ref 0–1.3)
MONOCYTES NFR BLD AUTO: 4.1 %
NEUTROPHILS # BLD AUTO: 7.3 10E9/L (ref 1.6–8.3)
NEUTROPHILS NFR BLD AUTO: 72.4 %
NRBC # BLD AUTO: 0 10*3/UL
NRBC BLD AUTO-RTO: 0 /100
PLATELET # BLD AUTO: 266 10E9/L (ref 150–450)
POTASSIUM SERPL-SCNC: 3.5 MMOL/L (ref 3.4–5.3)
PROT SERPL-MCNC: 6.4 G/DL (ref 6.8–8.8)
RBC # BLD AUTO: 4.9 10E12/L (ref 3.8–5.2)
SODIUM SERPL-SCNC: 141 MMOL/L (ref 133–144)
TROPONIN I SERPL-MCNC: <0.015 UG/L (ref 0–0.04)
WBC # BLD AUTO: 10.1 10E9/L (ref 4–11)

## 2019-03-28 PROCEDURE — 93005 ELECTROCARDIOGRAM TRACING: CPT | Performed by: STUDENT IN AN ORGANIZED HEALTH CARE EDUCATION/TRAINING PROGRAM

## 2019-03-28 PROCEDURE — 93010 ELECTROCARDIOGRAM REPORT: CPT | Mod: Z6 | Performed by: STUDENT IN AN ORGANIZED HEALTH CARE EDUCATION/TRAINING PROGRAM

## 2019-03-28 PROCEDURE — 84703 CHORIONIC GONADOTROPIN ASSAY: CPT | Performed by: STUDENT IN AN ORGANIZED HEALTH CARE EDUCATION/TRAINING PROGRAM

## 2019-03-28 PROCEDURE — 25000128 H RX IP 250 OP 636: Performed by: STUDENT IN AN ORGANIZED HEALTH CARE EDUCATION/TRAINING PROGRAM

## 2019-03-28 PROCEDURE — 99285 EMERGENCY DEPT VISIT HI MDM: CPT | Mod: 25 | Performed by: STUDENT IN AN ORGANIZED HEALTH CARE EDUCATION/TRAINING PROGRAM

## 2019-03-28 PROCEDURE — 96361 HYDRATE IV INFUSION ADD-ON: CPT | Performed by: STUDENT IN AN ORGANIZED HEALTH CARE EDUCATION/TRAINING PROGRAM

## 2019-03-28 PROCEDURE — 80053 COMPREHEN METABOLIC PANEL: CPT | Performed by: STUDENT IN AN ORGANIZED HEALTH CARE EDUCATION/TRAINING PROGRAM

## 2019-03-28 PROCEDURE — 70450 CT HEAD/BRAIN W/O DYE: CPT

## 2019-03-28 PROCEDURE — 25800030 ZZH RX IP 258 OP 636: Performed by: STUDENT IN AN ORGANIZED HEALTH CARE EDUCATION/TRAINING PROGRAM

## 2019-03-28 PROCEDURE — 84484 ASSAY OF TROPONIN QUANT: CPT | Performed by: STUDENT IN AN ORGANIZED HEALTH CARE EDUCATION/TRAINING PROGRAM

## 2019-03-28 PROCEDURE — 25000132 ZZH RX MED GY IP 250 OP 250 PS 637: Performed by: STUDENT IN AN ORGANIZED HEALTH CARE EDUCATION/TRAINING PROGRAM

## 2019-03-28 PROCEDURE — 85025 COMPLETE CBC W/AUTO DIFF WBC: CPT | Performed by: STUDENT IN AN ORGANIZED HEALTH CARE EDUCATION/TRAINING PROGRAM

## 2019-03-28 PROCEDURE — 96375 TX/PRO/DX INJ NEW DRUG ADDON: CPT | Performed by: STUDENT IN AN ORGANIZED HEALTH CARE EDUCATION/TRAINING PROGRAM

## 2019-03-28 PROCEDURE — 96374 THER/PROPH/DIAG INJ IV PUSH: CPT | Performed by: STUDENT IN AN ORGANIZED HEALTH CARE EDUCATION/TRAINING PROGRAM

## 2019-03-28 RX ORDER — ACETAMINOPHEN 325 MG/1
650 TABLET ORAL ONCE
Status: COMPLETED | OUTPATIENT
Start: 2019-03-28 | End: 2019-03-28

## 2019-03-28 RX ORDER — DEXAMETHASONE SODIUM PHOSPHATE 10 MG/ML
10 INJECTION, SOLUTION INTRAMUSCULAR; INTRAVENOUS ONCE
Status: COMPLETED | OUTPATIENT
Start: 2019-03-28 | End: 2019-03-28

## 2019-03-28 RX ORDER — KETOROLAC TROMETHAMINE 15 MG/ML
10 INJECTION, SOLUTION INTRAMUSCULAR; INTRAVENOUS ONCE
Status: COMPLETED | OUTPATIENT
Start: 2019-03-28 | End: 2019-03-28

## 2019-03-28 RX ORDER — DIPHENHYDRAMINE HYDROCHLORIDE 50 MG/ML
25 INJECTION INTRAMUSCULAR; INTRAVENOUS ONCE
Status: COMPLETED | OUTPATIENT
Start: 2019-03-28 | End: 2019-03-28

## 2019-03-28 RX ADMIN — KETOROLAC TROMETHAMINE 10 MG: 15 INJECTION, SOLUTION INTRAMUSCULAR; INTRAVENOUS at 19:03

## 2019-03-28 RX ADMIN — SODIUM CHLORIDE, POTASSIUM CHLORIDE, SODIUM LACTATE AND CALCIUM CHLORIDE 1000 ML: 600; 310; 30; 20 INJECTION, SOLUTION INTRAVENOUS at 15:01

## 2019-03-28 RX ADMIN — PROCHLORPERAZINE EDISYLATE 5 MG: 5 INJECTION INTRAMUSCULAR; INTRAVENOUS at 19:06

## 2019-03-28 RX ADMIN — DIPHENHYDRAMINE HYDROCHLORIDE 25 MG: 50 INJECTION, SOLUTION INTRAMUSCULAR; INTRAVENOUS at 19:07

## 2019-03-28 RX ADMIN — ACETAMINOPHEN 650 MG: 325 TABLET, FILM COATED ORAL at 16:28

## 2019-03-28 RX ADMIN — DEXAMETHASONE SODIUM PHOSPHATE 10 MG: 10 INJECTION, SOLUTION INTRAMUSCULAR; INTRAVENOUS at 19:08

## 2019-03-28 RX ADMIN — SODIUM CHLORIDE 500 ML: 9 INJECTION, SOLUTION INTRAVENOUS at 19:02

## 2019-03-28 ASSESSMENT — MIFFLIN-ST. JEOR: SCORE: 1398.08

## 2019-03-28 NOTE — ED AVS SNAPSHOT
[facility] Emergency Department  [address] [address]  Phone:  [phone]  Fax:  [phone]                                    Grace Dominguez   MRN: [id number]    Department:  [facility] Emergency Department   Date of Visit:  3/28/2019           After Visit Summary Signature Page    I have received my discharge instructions, and my questions have been answered. I have discussed any challenges I see with this plan with the nurse or doctor.    ..........................................................................................................................................  Patient/Patient Representative Signature      ..........................................................................................................................................  Patient Representative Print Name and Relationship to Patient    ..................................................               ................................................  Date                                   Time    ..........................................................................................................................................  Reviewed by Signature/Title    ...................................................              ..............................................  Date                                               Time          22EPIC Rev 08/18

## 2019-03-28 NOTE — ED NOTES
Brief syncopal episode at 1330 at work and hit rt forehead on filing cabinet-frontal ha now 7/10-sensitive to light-has had several near syncopal episodes past 2 mos after eating-pt had eaten pizza prior to episode today   pt has bump on rt forehead

## 2019-03-28 NOTE — ED PROVIDER NOTES
History     Chief Complaint   Patient presents with     Loss of Consciousness     passed out this morning and hit her head      HPI  Grace Dominguez is a 34 year old female with past medical history which includes generalized anxiety disorder and Mirena IUD placement who presents for evaluation after report of unwitnessed syncopal episode.  Patient explains that over the past couple of weeks she has intermittently felt a sensation of hard fast heart beating lightheadedness, however today she had gotten up to use a file cabinet at work and when she bent down she grew lightheaded and suffered visual loss bilaterally.  She recalls feeling her knees buckle and fell forward striking her head on the file cabinet before falling flat on the ground, she does not believe that she completely lost consciousness however.  Incident happened 2 hours prior to arrival.  She does have a moderately severe right frontal headache.  Patient denies visual changes, dental injury, neck pain or stiffness.  No other appreciable injuries.  She has taken no analgesic medication.    Allergies:  No Known Allergies    Problem List:    Patient Active Problem List    Diagnosis Date Noted     ARDEN (generalized anxiety disorder) 12/26/2016     Priority: Medium     Stable on celexa       mirena IUD 11/28/2016     Priority: Medium     Mirena IUD placed today 11/28/16 by Faith Miguel MD  LOT# [id number]  Exp: 08/19         Pityriasis rosea 10/14/2016     Priority: Medium     Family history of congenital anomaly 04/15/2016     Priority: Medium     Daughter had hydrocephalus and developmental delay related to infection       Mild dysplasia of cervix 04/15/2016     Priority: Medium     10/14/09 LSIL  2/23/10 [place]: BRITTNY 1.   4/15/16 LSIL/ + HR HPV @ age 31. 12w1d pregnant. Plan colp   5/13/16: [place] visually normal, no biopsies taken. Plan cotest at 1 year.   12/20/16:Pap--NIL, neg HPV. Plan: pap and HPV due in 3 years.   1/17/19 NIL pap, Neg HPV. Plan: 3 year  pap/routine screening          Past Medical History:    Past Medical History:   Diagnosis Date     Abnormal Pap smear of cervix age 25     Chickenpox      History of blood transfusion 2005     History of blood transfusion reaction      History of colposcopy with cervical biopsy 16     Kidney stones      Papanicolaou smear of cervix with low grade squamous intraepithelial lesion (LGSIL) 4/15/16     PUPP (pruritic urticarial papules and plaques of pregnancy)        Past Surgical History:    Past Surgical History:   Procedure Laterality Date     BIOPSY  10/17/2016    Skin biopsy due to terrible rash     C/SECTION, LOW TRANSVERSE      , Low Transverse      SECTION, TUBAL LIGATION, COMBINED N/A 10/13/2016    Procedure: COMBINED  SECTION, TUBAL LIGATION;  Surgeon: Nicole Pitts MD;  Location: [facility] OR     ESOPHAGOSCOPY, GASTROSCOPY, DUODENOSCOPY (EGD), COMBINED N/A 2017    Procedure: COMBINED ESOPHAGOSCOPY, GASTROSCOPY, DUODENOSCOPY (EGD);  gastroscopy;  Surgeon: Milton Haines MD;  Location: [facility] GI     LASER HOLMIUM LITHOTRIPSY URETER(S), INSERT STENT, COMBINED Right 2018    Procedure: COMBINED CYSTOSCOPY, URETEROSCOPY, LASER HOLMIUM LITHOTRIPSY URETER(S), INSERT STENT;  Cystoscopy, Right Ureteroscopy, Laser Lithotripsy, Stent Placement;  Surgeon: Kadien Jefferson MD;  Location:  OR     MOUTH SURGERY      wisdom teeth     MOUTH SURGERY      as child       Family History:    Family History   Problem Relation Age of Onset     Congenital Anomalies Daughter         hydrocephalus     Hypertension Maternal Grandmother      Other Cancer Maternal Grandfather         lung     Substance Abuse Maternal Grandfather         alcohol     Other Cancer Paternal Grandmother         lung     Depression Father         suicide     Depression Mother      Substance Abuse Mother         drugs- in treatment     Substance Abuse Paternal Grandfather         alcohol       Social  "History:  Marital Status:   [2]  Social History     Tobacco Use     Smoking status: Former Smoker     Last attempt to quit: 12/13/2015     Years since quitting: 3.2     Smokeless tobacco: Never Used   Substance Use Topics     Alcohol use: No     Alcohol/week: 0.0 oz     Comment: 2 drinks weekly- quit with pregnancy     Drug use: No        Medications:      citalopram (CELEXA) 20 MG tablet   levonorgestrel (MIRENA) 20 MCG/24HR IUD   Omeprazole (PRILOSEC PO)   triamcinolone (KENALOG) 0.1 % cream         Review of Systems  Constitutional:  Negative for fever or recent illness.  HENT: Negative for dental or intraoral injury.  Eye:  Negative for diplopia or visual field deficits.  Cardiovascular: Positive for palpitations.  Negative for chest pain.  Respiratory:  Negative for cough or shortness of breath.  Gastrointestinal:  Negative for abdominal pain, vomiting, or diarrhea.  Tolerated lunch well.  Musculoskeletal: Negative for neck pain, back pain, or extremity injury.  Neurological: Positive for near loss of consciousness episode.  Persistent right frontal headache since her fall.  Negative for sensory deficits or focal weakness.  Skin:  Negative for laceration or bleeding injury.    All others reviewed and are negative.      Physical Exam   BP: 104/72  Pulse: 86  Temp: 98.1  F (36.7  C)  Resp: 16  Height: 162.6 cm (5' 4\")  Weight: 70.3 kg (155 lb)  SpO2: 99 %      Physical Exam  Constitutional:  Well developed, well nourished.  Appears stable and in no acute distress.  Head: Small right frontal hematoma, otherwise atraumatic appearance of face.  Negative for Raccoon eyes and Nicole sign.  No tenderness of facial bones or skull circumferentially.  Eye:  No proptosis or subconjunctival hemorrhage.  Eyelids appear symmetrical.  PERRLA without pain.  EOMI and denies diplopia.  Oral:  Patient is without trismus or malocclusion.  Moist oral mucosa without oral laceration.    Ears:  External auditory canals without " blood or discharge, tympanic membranes without hemotympanum.  No mastoid region tenderness.  Neck:  No tenderness of midline cervical vertebra.  Ranging neck freely without being held in self-protecting position.   Cardiovascular:  No cyanosis.  RRR.  No audible murmurs noted.   Respiratory/chest:  Effort normal, no respiratory distress.  CTAB without diminished lung sounds.  Gastrointestinal:  Soft nondistended abdomen.  No tenderness, guarding, rigidity, or rebound tenderness.  No palpable organomegaly.  Musculoskeletal:  No extremity deformities.  No hip tenderness or pelvic instability.  No step-offs and no tenderness to palpation of midline thoracic or lumbosacral vertebra.  Moves extremities spontaneously and without complaint.    Neuro:  Patient is alert and verbally responsive.   Skin:  Skin is warm and dry, not diaphoretic.    Psych:  Normal mood and affect, not overly anxious or clinically intoxicated.    Joe Coma Scale - GCS (calculator)    Motor 6=Obeys commands   Verbal 5=Oriented   Eye Opening 4=Spontaneous   Total: 15       ED Course        Procedures                 EKG Interpretation:      Interpreted by: Artemio Gomes  Time reviewed: Upon arrival     Symptoms at time of EKG: Asymptomatic   Rhythm: Sinus  Rate: Normal  Axis: Normal    Conduction: None atypical   ST Segments/ T Waves: No pathologic ST-elevations or T-wave abnormalities.  Q Waves: None  Comparison to prior: Similar morphology to previous     Clinical Impression: No sign of ischemia or arrhythmia        Critical Care time:  none               Results for orders placed or performed during the hospital encounter of 03/28/19 (from the past 24 hour(s))   CBC with platelets differential   Result Value Ref Range    WBC 10.1 4.0 - 11.0 10e9/L    RBC Count 4.90 3.8 - 5.2 10e12/L    Hemoglobin 14.2 11.7 - 15.7 g/dL    Hematocrit 42.8 35.0 - 47.0 %    MCV 87 78 - 100 fl    MCH 29.0 26.5 - 33.0 pg    MCHC 33.2 31.5 - 36.5 g/dL    RDW 11.9  10.0 - 15.0 %    Platelet Count 266 150 - 450 10e9/L    Diff Method Automated Method     % Neutrophils 72.4 %    % Lymphocytes 21.1 %    % Monocytes 4.1 %    % Eosinophils 1.4 %    % Basophils 0.3 %    % Immature Granulocytes 0.7 %    Nucleated RBCs 0 0 /100    Absolute Neutrophil 7.3 1.6 - 8.3 10e9/L    Absolute Lymphocytes 2.1 0.8 - 5.3 10e9/L    Absolute Monocytes 0.4 0.0 - 1.3 10e9/L    Absolute Eosinophils 0.1 0.0 - 0.7 10e9/L    Absolute Basophils 0.0 0.0 - 0.2 10e9/L    Abs Immature Granulocytes 0.1 0 - 0.4 10e9/L    Absolute Nucleated RBC 0.0    Comprehensive metabolic panel   Result Value Ref Range    Sodium 141 133 - 144 mmol/L    Potassium 3.5 3.4 - 5.3 mmol/L    Chloride 108 94 - 109 mmol/L    Carbon Dioxide 25 20 - 32 mmol/L    Anion Gap 8 3 - 14 mmol/L    Glucose 121 (H) 70 - 99 mg/dL    Urea Nitrogen 14 7 - 30 mg/dL    Creatinine 0.69 0.52 - 1.04 mg/dL    GFR Estimate >90 >60 mL/min/[1.73_m2]    GFR Estimate If Black >90 >60 mL/min/[1.73_m2]    Calcium 8.4 (L) 8.5 - 10.1 mg/dL    Bilirubin Total 0.2 0.2 - 1.3 mg/dL    Albumin 3.6 3.4 - 5.0 g/dL    Protein Total 6.4 (L) 6.8 - 8.8 g/dL    Alkaline Phosphatase 56 40 - 150 U/L    ALT 19 0 - 50 U/L    AST 10 0 - 45 U/L   Troponin I   Result Value Ref Range    Troponin I ES <0.015 0.000 - 0.045 ug/L   HCG qualitative pregnancy (blood)   Result Value Ref Range    HCG Qualitative Serum Negative NEG^Negative   Head CT w/o contrast    Narrative    CT SCAN OF THE HEAD WITHOUT CONTRAST   3/28/2019 3:57 PM     HISTORY: Syncope resulting in closed head injury, struck right  forehead.    TECHNIQUE:  Axial images of the head and coronal reformations without  IV contrast material. Radiation dose for this scan was reduced using  automated exposure control, adjustment of the mA and/or kV according  to patient size, or iterative reconstruction technique.    COMPARISON: None.    FINDINGS: There is no evidence of intracranial hemorrhage, mass, acute  infarct or anomaly.  The ventricles are normal in size, shape and  configuration. The brain parenchyma and subarachnoid spaces are  normal. There is crowding of the cerebellar tonsils in the foramen  magnum.    The visualized portions of the sinuses and mastoids appear normal. The  bony calvarium and bones of the skull base appear intact.       Impression    IMPRESSION: No evidence of acute intracranial hemorrhage, mass, or  herniation.      XIOMY BRADY MD       Medications   ketorolac (TORADOL) injection 10 mg (not administered)   prochlorperazine (COMPAZINE) injection 5 mg (not administered)   diphenhydrAMINE (BENADRYL) injection 25 mg (not administered)   dexamethasone PF (DECADRON) injection 10 mg (not administered)   lactated ringers BOLUS 1,000 mL (0 mLs Intravenous Stopped 3/28/19 1723)   acetaminophen (TYLENOL) tablet 650 mg (650 mg Oral Given 3/28/19 1628)           Assessments & Plan (with Medical Decision Making)   Grace Dominguez is a 34 year old female who presents to the department for evaluation after report of syncopal versus near syncopal episode followed by closed head injury.  She does have a headache in the department but maintains that headache was only after striking her head on a file cabinet and not prior to the near loss of consciousness.  Differential diagnosis includes anemia, electrolyte abnormality, intoxication, ICH, pulmonary embolism, ectopic pregnancy and vasovagal syndrome.  Other than mild headache, she has remained comfortable during stay in the department without recurrence of symptoms.  CBC without leukocytosis or anemia.  EKG is without evidence of WPW, Brugada, long-QT syndrome, hypertrophic cardiomyopathy, or heart block.  No sign of arrhythmia on cardiac monitor.  During her stay she admitted to similar more mild episodes of lightheadedness over the past month or so, occasionally feels as though her heart is beating hard or fast but may be a response to orthostasis as opposed to arrhythmia.   "Recommend follow-up with primary provider over the next 3-5 days for reevaluation of syncopal episode and closed head injury, order placed for outpatient cardiac monitor as well.    Patient seems to be agreeable with discharge plan including follow-up and return instructions, however when they formally discharge the patient she began complaining of increased throbbing right-sided headache.  She is without lethargy or neurologic deficits but headache has grown in severity despite initial treatment with acetaminophen.  She does have a history of migraine headaches but this feels more severe and localized to the front of her head.  Consulted Neuroradiologist Dr. Youngblood who has again reviewed the patient's imaging and sees no sign of bleeding or acute injury.  Her headache dramatically improved with \"migraine cocktail\" in the department development of further symptoms.  She has been instructed to return for any change in state or other concerns.        Disclaimer:  This note consists of symbols derived from keyboarding, dictation, and/or voice recognition software.  As a result, there may be errors in the script that have gone undetected.  Please consider this when interpreting information found in the chart.        I have reviewed the nursing notes.    I have reviewed the findings, diagnosis, plan and need for follow up with the patient.          Medication List      There are no discharge medications for this visit.         Final diagnoses:   Syncope, unspecified syncope type   Closed head injury, initial encounter       3/28/2019   [facility] EMERGENCY DEPARTMENT     Artemio Gomes, DO  03/28/19 1719       Artemio Gomes, DO  03/28/19 0796    "

## 2019-03-28 NOTE — ED NOTES
Attempted to discharge patient - patient is tearful and c/o excruciating headache - worse than before - MD in room

## 2019-03-29 NOTE — ED NOTES
"Pt has a HX of migraines and states \"this feel the same\". She rates pain 9/10, she is a/o x 4. Monitor    "

## 2019-05-14 ENCOUNTER — E-VISIT (OUTPATIENT)
Dept: FAMILY MEDICINE | Facility: CLINIC | Age: 34
End: 2019-05-14
Payer: COMMERCIAL

## 2019-05-14 DIAGNOSIS — R11.2 NAUSEA AND VOMITING, INTRACTABILITY OF VOMITING NOT SPECIFIED, UNSPECIFIED VOMITING TYPE: Primary | ICD-10-CM

## 2019-05-14 PROCEDURE — 99444 ZZC PHYSICIAN ONLINE EVALUATION & MANAGEMENT SERVICE: CPT | Performed by: NURSE PRACTITIONER

## 2019-08-18 ENCOUNTER — HOSPITAL ENCOUNTER (EMERGENCY)
Facility: CLINIC | Age: 34
Discharge: HOME | End: 2019-08-18
Attending: EMERGENCY MEDICINE | Admitting: EMERGENCY MEDICINE
Payer: COMMERCIAL

## 2019-08-18 VITALS
SYSTOLIC BLOOD PRESSURE: 109 MMHG | BODY MASS INDEX: 25.75 KG/M2 | OXYGEN SATURATION: 99 % | TEMPERATURE: 97.2 F | DIASTOLIC BLOOD PRESSURE: 69 MMHG | RESPIRATION RATE: 16 BRPM | HEART RATE: 59 BPM | WEIGHT: 150 LBS

## 2019-08-18 DIAGNOSIS — R10.32 LEFT LOWER QUADRANT PAIN: ICD-10-CM

## 2019-08-18 LAB
ALBUMIN SERPL-MCNC: 3.6 G/DL (ref 3.4–5)
ALBUMIN UR-MCNC: 30 MG/DL
ALP SERPL-CCNC: 41 U/L (ref 40–150)
ALT SERPL W P-5'-P-CCNC: 17 U/L (ref 0–50)
ANION GAP SERPL CALCULATED.3IONS-SCNC: 7 MMOL/L (ref 3–14)
APPEARANCE UR: ABNORMAL
AST SERPL W P-5'-P-CCNC: 10 U/L (ref 0–45)
BACTERIA #/AREA URNS HPF: ABNORMAL /HPF
BASOPHILS # BLD AUTO: 0 10E9/L (ref 0–0.2)
BASOPHILS NFR BLD AUTO: 0.5 %
BILIRUB SERPL-MCNC: 0.4 MG/DL (ref 0.2–1.3)
BILIRUB UR QL STRIP: NEGATIVE
BUN SERPL-MCNC: 10 MG/DL (ref 7–30)
CALCIUM SERPL-MCNC: 8.1 MG/DL (ref 8.5–10.1)
CHLORIDE SERPL-SCNC: 113 MMOL/L (ref 94–109)
CO2 SERPL-SCNC: 22 MMOL/L (ref 20–32)
COLOR UR AUTO: YELLOW
CREAT SERPL-MCNC: 0.75 MG/DL (ref 0.52–1.04)
DIFFERENTIAL METHOD BLD: NORMAL
EOSINOPHIL # BLD AUTO: 0.1 10E9/L (ref 0–0.7)
EOSINOPHIL NFR BLD AUTO: 2 %
ERYTHROCYTE [DISTWIDTH] IN BLOOD BY AUTOMATED COUNT: 12.2 % (ref 10–15)
GFR SERPL CREATININE-BSD FRML MDRD: >90 ML/MIN/{1.73_M2}
GLUCOSE SERPL-MCNC: 92 MG/DL (ref 70–99)
GLUCOSE UR STRIP-MCNC: NEGATIVE MG/DL
HCG UR QL: NEGATIVE
HCT VFR BLD AUTO: 42.8 % (ref 35–47)
HGB BLD-MCNC: 13.9 G/DL (ref 11.7–15.7)
HGB UR QL STRIP: ABNORMAL
IMM GRANULOCYTES # BLD: 0 10E9/L (ref 0–0.4)
IMM GRANULOCYTES NFR BLD: 0.7 %
KETONES UR STRIP-MCNC: NEGATIVE MG/DL
LEUKOCYTE ESTERASE UR QL STRIP: NEGATIVE
LYMPHOCYTES # BLD AUTO: 2 10E9/L (ref 0.8–5.3)
LYMPHOCYTES NFR BLD AUTO: 32.8 %
MCH RBC QN AUTO: 28.8 PG (ref 26.5–33)
MCHC RBC AUTO-ENTMCNC: 32.5 G/DL (ref 31.5–36.5)
MCV RBC AUTO: 89 FL (ref 78–100)
MONOCYTES # BLD AUTO: 0.4 10E9/L (ref 0–1.3)
MONOCYTES NFR BLD AUTO: 7.1 %
MUCOUS THREADS #/AREA URNS LPF: PRESENT /LPF
NEUTROPHILS # BLD AUTO: 3.5 10E9/L (ref 1.6–8.3)
NEUTROPHILS NFR BLD AUTO: 56.9 %
NITRATE UR QL: NEGATIVE
NRBC # BLD AUTO: 0 10*3/UL
NRBC BLD AUTO-RTO: 0 /100
PH UR STRIP: 6 PH (ref 5–7)
PLATELET # BLD AUTO: 271 10E9/L (ref 150–450)
POTASSIUM SERPL-SCNC: 4 MMOL/L (ref 3.4–5.3)
PROT SERPL-MCNC: 6.2 G/DL (ref 6.8–8.8)
RBC # BLD AUTO: 4.83 10E12/L (ref 3.8–5.2)
RBC #/AREA URNS AUTO: >182 /HPF (ref 0–2)
SODIUM SERPL-SCNC: 142 MMOL/L (ref 133–144)
SOURCE: ABNORMAL
SP GR UR STRIP: 1.02 (ref 1–1.03)
SQUAMOUS #/AREA URNS AUTO: 4 /HPF (ref 0–1)
UROBILINOGEN UR STRIP-MCNC: 0 MG/DL (ref 0–2)
WBC # BLD AUTO: 6.1 10E9/L (ref 4–11)
WBC #/AREA URNS AUTO: 7 /HPF (ref 0–5)

## 2019-08-18 PROCEDURE — 85025 COMPLETE CBC W/AUTO DIFF WBC: CPT | Performed by: EMERGENCY MEDICINE

## 2019-08-18 PROCEDURE — 99285 EMERGENCY DEPT VISIT HI MDM: CPT | Mod: 25

## 2019-08-18 PROCEDURE — 25000128 H RX IP 250 OP 636: Performed by: EMERGENCY MEDICINE

## 2019-08-18 PROCEDURE — 96374 THER/PROPH/DIAG INJ IV PUSH: CPT

## 2019-08-18 PROCEDURE — 99285 EMERGENCY DEPT VISIT HI MDM: CPT | Mod: Z6 | Performed by: EMERGENCY MEDICINE

## 2019-08-18 PROCEDURE — 80053 COMPREHEN METABOLIC PANEL: CPT | Performed by: EMERGENCY MEDICINE

## 2019-08-18 PROCEDURE — 96375 TX/PRO/DX INJ NEW DRUG ADDON: CPT

## 2019-08-18 PROCEDURE — 81001 URINALYSIS AUTO W/SCOPE: CPT | Performed by: EMERGENCY MEDICINE

## 2019-08-18 PROCEDURE — 25800030 ZZH RX IP 258 OP 636: Performed by: EMERGENCY MEDICINE

## 2019-08-18 PROCEDURE — 96361 HYDRATE IV INFUSION ADD-ON: CPT

## 2019-08-18 PROCEDURE — 81025 URINE PREGNANCY TEST: CPT | Performed by: EMERGENCY MEDICINE

## 2019-08-18 RX ORDER — HYDROMORPHONE HYDROCHLORIDE 1 MG/ML
0.5 INJECTION, SOLUTION INTRAMUSCULAR; INTRAVENOUS; SUBCUTANEOUS
Status: COMPLETED | OUTPATIENT
Start: 2019-08-18 | End: 2019-08-18

## 2019-08-18 RX ORDER — SODIUM CHLORIDE, SODIUM LACTATE, POTASSIUM CHLORIDE, CALCIUM CHLORIDE 600; 310; 30; 20 MG/100ML; MG/100ML; MG/100ML; MG/100ML
INJECTION, SOLUTION INTRAVENOUS ONCE
Status: COMPLETED | OUTPATIENT
Start: 2019-08-18 | End: 2019-08-18

## 2019-08-18 RX ORDER — ONDANSETRON 2 MG/ML
4 INJECTION INTRAMUSCULAR; INTRAVENOUS EVERY 30 MIN PRN
Status: DISCONTINUED | OUTPATIENT
Start: 2019-08-18 | End: 2019-08-18 | Stop reason: HOSPADM

## 2019-08-18 RX ORDER — KETOROLAC TROMETHAMINE 15 MG/ML
10 INJECTION, SOLUTION INTRAMUSCULAR; INTRAVENOUS
Status: COMPLETED | OUTPATIENT
Start: 2019-08-18 | End: 2019-08-18

## 2019-08-18 RX ORDER — KETOROLAC TROMETHAMINE 10 MG/1
10 TABLET, FILM COATED ORAL EVERY 6 HOURS PRN
Qty: 7 TABLET | Refills: 0 | Status: SHIPPED | OUTPATIENT
Start: 2019-08-18 | End: 2019-08-26

## 2019-08-18 RX ORDER — HYDROMORPHONE HYDROCHLORIDE 2 MG/1
2 TABLET ORAL EVERY 6 HOURS PRN
Qty: 5 TABLET | Refills: 0 | Status: SHIPPED | OUTPATIENT
Start: 2019-08-18

## 2019-08-18 RX ORDER — ONDANSETRON 4 MG/1
4 TABLET, ORALLY DISINTEGRATING ORAL EVERY 8 HOURS PRN
Qty: 7 TABLET | Refills: 0 | Status: SHIPPED | OUTPATIENT
Start: 2019-08-18

## 2019-08-18 RX ADMIN — SODIUM CHLORIDE, POTASSIUM CHLORIDE, SODIUM LACTATE AND CALCIUM CHLORIDE: 600; 310; 30; 20 INJECTION, SOLUTION INTRAVENOUS at 10:11

## 2019-08-18 RX ADMIN — ONDANSETRON 4 MG: 2 INJECTION INTRAMUSCULAR; INTRAVENOUS at 10:11

## 2019-08-18 RX ADMIN — KETOROLAC TROMETHAMINE 10 MG: 15 INJECTION, SOLUTION INTRAMUSCULAR; INTRAVENOUS at 10:50

## 2019-08-18 RX ADMIN — HYDROMORPHONE HYDROCHLORIDE 0.5 MG: 1 INJECTION, SOLUTION INTRAMUSCULAR; INTRAVENOUS; SUBCUTANEOUS at 10:49

## 2019-08-18 ASSESSMENT — ENCOUNTER SYMPTOMS
ALLERGIC/IMMUNOLOGIC NEGATIVE: 1
ENDOCRINE NEGATIVE: 1
NEUROLOGICAL NEGATIVE: 1
CARDIOVASCULAR NEGATIVE: 1
PSYCHIATRIC NEGATIVE: 1
MUSCULOSKELETAL NEGATIVE: 1
EYES NEGATIVE: 1
HEMATOLOGIC/LYMPHATIC NEGATIVE: 1
ABDOMINAL PAIN: 1
RESPIRATORY NEGATIVE: 1
CONSTITUTIONAL NEGATIVE: 1

## 2019-08-18 NOTE — DISCHARGE INSTRUCTIONS
1) We have agreed to allow you to go home with suspicion of your pain and discomfort may be due to a kidney stone with known history of kidney stones with last imaging from 2017 showing a small 2 mm stone.    2) also reviewed that there is a possibility that this could be also such as an ovarian cyst.    3) You are discharge home with Toradol which was given during your stay and Dilaudid to use for pain and discomfort over the next couple of days to see if this will improve your pain.  We discussed that there was blood in your urine which raises the suspicion that this could be due to a kidney stone.    4) if symptoms worsen including increasing pain, vomiting, fever chills, or new concerns he should return to the emergency department for reevaluation additional care

## 2019-08-18 NOTE — ED AVS SNAPSHOT
[facility] Emergency Department  [address] [address]  Phone:  [phone]  Fax:  [phone]                                    Grace Dominguez   MRN: [id number]    Department:  [facility] Emergency Department   Date of Visit:  8/18/2019           After Visit Summary Signature Page    I have received my discharge instructions, and my questions have been answered. I have discussed any challenges I see with this plan with the nurse or doctor.    ..........................................................................................................................................  Patient/Patient Representative Signature      ..........................................................................................................................................  Patient Representative Print Name and Relationship to Patient    ..................................................               ................................................  Date                                   Time    ..........................................................................................................................................  Reviewed by Signature/Title    ...................................................              ..............................................  Date                                               Time          22EPIC Rev 08/18

## 2019-08-18 NOTE — ED NOTES
Pt c/o left lower abdominal pain -  Onset this morning - pain caused some nausea although denies any vomiting or diarrhea - had normal BM this morning. Patient does have hx of kidney stone on the right last year that had to be removed. No urinary sx and was able to pass urine this morning without difficulty.Patient has IUD in place and has had tubal ligation - she is mid cycle due to have period at the end of the month. Pt is doing a 21 day weight loss program and is working out more frequently - states she doesn't sweat normally and was working out yesterday when she became overheated with arm and hand cramping/tingling. Patient arrives with her

## 2019-08-18 NOTE — ED PROVIDER NOTES
History     Chief Complaint   Patient presents with     Abdominal Pain     LLQ abd pain since this am, nausea. no vomiting and diarrhea. LBM this am. no hx of bowel disease     HPI  Grace Dominguez is a 34 year old female rise anxiety disorder who presents for evaluation for left lower quadrant abdominal pain this morning with some nausea but no vomiting.  She has a history of kidney stones that required surgical intervention in 2018 and a history of tubal ligation with IUD in place.  She arrived by private car with her  (Paddy) with report of sudden onset of left lower quadrant abdominal pain after waking from sleep at 9 AM just prior to arrival in the emergency department.  History of tubal ligation 2016.  Prior history of kidney stone with surgical intervention in 2017.  No back pain no flank pain.  No vaginal discharge or bleeding.  Patient with ongoing pain and discomfort presented for further care.    Allergies:  No Known Allergies    Problem List:    Patient Active Problem List    Diagnosis Date Noted     AREDN (generalized anxiety disorder) 12/26/2016     Priority: Medium     Stable on celexa       mirena IUD 11/28/2016     Priority: Medium     Mirena IUD placed today 11/28/16 by Faith Miguel MD  LOT# [id number]  Exp: 08/19         Pityriasis rosea 10/14/2016     Priority: Medium     Family history of congenital anomaly 04/15/2016     Priority: Medium     Daughter had hydrocephalus and developmental delay related to infection       Mild dysplasia of cervix 04/15/2016     Priority: Medium     10/14/09 LSIL  2/23/10 [place]: BRITTNY 1.   4/15/16 LSIL/ + HR HPV @ age 31. 12w1d pregnant. Plan colp   5/13/16: [place] visually normal, no biopsies taken. Plan cotest at 1 year.   12/20/16:Pap--NIL, neg HPV. Plan: pap and HPV due in 3 years.   1/17/19 NIL pap, Neg HPV. Plan: 3 year pap/routine screening          Past Medical History:    Past Medical History:   Diagnosis Date     Abnormal Pap smear of cervix age 25      Chickenpox      History of blood transfusion 2005     History of blood transfusion reaction      History of colposcopy with cervical biopsy 16     Kidney stones      Papanicolaou smear of cervix with low grade squamous intraepithelial lesion (LGSIL) 4/15/16     PUPP (pruritic urticarial papules and plaques of pregnancy)        Past Surgical History:    Past Surgical History:   Procedure Laterality Date     BIOPSY  10/17/2016    Skin biopsy due to terrible rash     C/SECTION, LOW TRANSVERSE      , Low Transverse      SECTION, TUBAL LIGATION, COMBINED N/A 10/13/2016    Procedure: COMBINED  SECTION, TUBAL LIGATION;  Surgeon: Nicole Pitts MD;  Location: [facility] OR     ESOPHAGOSCOPY, GASTROSCOPY, DUODENOSCOPY (EGD), COMBINED N/A 2017    Procedure: COMBINED ESOPHAGOSCOPY, GASTROSCOPY, DUODENOSCOPY (EGD);  gastroscopy;  Surgeon: Milton Haines MD;  Location: [facility] GI     LASER HOLMIUM LITHOTRIPSY URETER(S), INSERT STENT, COMBINED Right 2018    Procedure: COMBINED CYSTOSCOPY, URETEROSCOPY, LASER HOLMIUM LITHOTRIPSY URETER(S), INSERT STENT;  Cystoscopy, Right Ureteroscopy, Laser Lithotripsy, Stent Placement;  Surgeon: Kaiden Jefferson MD;  Location:  OR     MOUTH SURGERY      wisdom teeth     MOUTH SURGERY      as child       Family History:    Family History   Problem Relation Age of Onset     Congenital Anomalies Daughter         hydrocephalus     Hypertension Maternal Grandmother      Other Cancer Maternal Grandfather         lung     Substance Abuse Maternal Grandfather         alcohol     Other Cancer Paternal Grandmother         lung     Depression Father         suicide     Depression Mother      Substance Abuse Mother         drugs- in treatment     Substance Abuse Paternal Grandfather         alcohol       Social History:  Marital Status:   [2]  Social History     Tobacco Use     Smoking status: Former Smoker     Last attempt to quit: 2015      Years since quitting: 3.6     Smokeless tobacco: Never Used   Substance Use Topics     Alcohol use: No     Alcohol/week: 0.0 oz     Comment: 2 drinks weekly- quit with pregnancy     Drug use: No        Medications:      citalopram (CELEXA) 20 MG tablet   HYDROmorphone (DILAUDID) 2 MG tablet   ketorolac (TORADOL) 10 MG tablet   levonorgestrel (MIRENA) 20 MCG/24HR IUD   ondansetron (ZOFRAN ODT) 4 MG ODT tab         Review of Systems   Constitutional: Negative.    HENT: Negative.    Eyes: Negative.    Respiratory: Negative.    Cardiovascular: Negative.    Gastrointestinal: Positive for abdominal pain.   Endocrine: Negative.    Genitourinary: Negative.    Musculoskeletal: Negative.    Skin: Negative.    Allergic/Immunologic: Negative.    Neurological: Negative.    Hematological: Negative.    Psychiatric/Behavioral: Negative.    All other systems reviewed and are negative.      Physical Exam   BP: 120/83  Pulse: 82  Temp: 97.2  F (36.2  C)  Resp: 16  Weight: 68 kg (150 lb)  SpO2: 99 %      Physical Exam   Constitutional: She is oriented to person, place, and time. She appears well-developed and well-nourished. She does not appear ill. She appears distressed.   Eyes: Pupils are equal, round, and reactive to light. EOM are normal.   Cardiovascular: Normal rate and regular rhythm.   Pulmonary/Chest: Effort normal and breath sounds normal. No stridor. No respiratory distress. She has no wheezes. She has no rhonchi. She has no rales. She exhibits no tenderness.   Abdominal: Normal appearance and bowel sounds are normal. There is tenderness in the left lower quadrant.       Neurological: She is alert and oriented to person, place, and time.   Skin: Capillary refill takes less than 2 seconds. No rash noted. She is not diaphoretic. No cyanosis or erythema. No pallor.   Psychiatric: She has a normal mood and affect. Her behavior is normal.       ED Course        Procedures               Critical Care time:  none              ED  medications:  Medications   lactated ringers infusion ( Intravenous Stopped 8/18/19 1153)   ketorolac (TORADOL) injection 10 mg (10 mg Intravenous Given 8/18/19 1050)   HYDROmorphone (PF) (DILAUDID) injection 0.5 mg (0.5 mg Intravenous Given 8/18/19 1049)       ED labs and imaging:  Results for orders placed or performed during the hospital encounter of 08/18/19 (from the past 24 hour(s))   CBC with platelets differential   Result Value Ref Range    WBC 6.1 4.0 - 11.0 10e9/L    RBC Count 4.83 3.8 - 5.2 10e12/L    Hemoglobin 13.9 11.7 - 15.7 g/dL    Hematocrit 42.8 35.0 - 47.0 %    MCV 89 78 - 100 fl    MCH 28.8 26.5 - 33.0 pg    MCHC 32.5 31.5 - 36.5 g/dL    RDW 12.2 10.0 - 15.0 %    Platelet Count 271 150 - 450 10e9/L    Diff Method Automated Method     % Neutrophils 56.9 %    % Lymphocytes 32.8 %    % Monocytes 7.1 %    % Eosinophils 2.0 %    % Basophils 0.5 %    % Immature Granulocytes 0.7 %    Nucleated RBCs 0 0 /100    Absolute Neutrophil 3.5 1.6 - 8.3 10e9/L    Absolute Lymphocytes 2.0 0.8 - 5.3 10e9/L    Absolute Monocytes 0.4 0.0 - 1.3 10e9/L    Absolute Eosinophils 0.1 0.0 - 0.7 10e9/L    Absolute Basophils 0.0 0.0 - 0.2 10e9/L    Abs Immature Granulocytes 0.0 0 - 0.4 10e9/L    Absolute Nucleated RBC 0.0    Comprehensive metabolic panel   Result Value Ref Range    Sodium 142 133 - 144 mmol/L    Potassium 4.0 3.4 - 5.3 mmol/L    Chloride 113 (H) 94 - 109 mmol/L    Carbon Dioxide 22 20 - 32 mmol/L    Anion Gap 7 3 - 14 mmol/L    Glucose 92 70 - 99 mg/dL    Urea Nitrogen 10 7 - 30 mg/dL    Creatinine 0.75 0.52 - 1.04 mg/dL    GFR Estimate >90 >60 mL/min/[1.73_m2]    GFR Estimate If Black >90 >60 mL/min/[1.73_m2]    Calcium 8.1 (L) 8.5 - 10.1 mg/dL    Bilirubin Total 0.4 0.2 - 1.3 mg/dL    Albumin 3.6 3.4 - 5.0 g/dL    Protein Total 6.2 (L) 6.8 - 8.8 g/dL    Alkaline Phosphatase 41 40 - 150 U/L    ALT 17 0 - 50 U/L    AST 10 0 - 45 U/L   UA reflex to Microscopic   Result Value Ref Range    Color Urine Yellow      Appearance Urine Slightly Cloudy     Glucose Urine Negative NEG^Negative mg/dL    Bilirubin Urine Negative NEG^Negative    Ketones Urine Negative NEG^Negative mg/dL    Specific Gravity Urine 1.016 1.003 - 1.035    Blood Urine Large (A) NEG^Negative    pH Urine 6.0 5.0 - 7.0 pH    Protein Albumin Urine 30 (A) NEG^Negative mg/dL    Urobilinogen mg/dL 0.0 0.0 - 2.0 mg/dL    Nitrite Urine Negative NEG^Negative    Leukocyte Esterase Urine Negative NEG^Negative    Source Midstream Urine     RBC Urine >182 (H) 0 - 2 /HPF    WBC Urine 7 (H) 0 - 5 /HPF    Bacteria Urine Moderate (A) NEG^Negative /HPF    Squamous Epithelial /HPF Urine 4 (H) 0 - 1 /HPF    Mucous Urine Present (A) NEG^Negative /LPF   HCG qualitative urine   Result Value Ref Range    HCG Qual Urine Negative NEG^Negative       ED Vitals:  Vitals:    08/18/19 1030 08/18/19 1100 08/18/19 1102 08/18/19 1130   BP: (!) 123/92 109/68  109/69   Pulse: 56 63  59   Resp:       Temp:       TempSrc:       SpO2: 100% 99% 98% 99%   Weight:           Assessments & Plan (with Medical Decision Making)   Clinical impression: 34-year-old female who presented with left lower quadrant abdominal pain just prior to arrival in the department.  The exact cause of her symptoms as reported is not clear working diagnosis is renal/ureteral stone with colic, not exclude ovarian cyst versus other ovarian pathology.  She reports she awoke with pain in the left lower quadrants at around 9 AM.  History of tubal ligation in 2016, and a recent evaluation for kidney stone with surgical intervention in 2017.  Currently on citalopram, no medication allergies.  She is reporting no back pain no flank pain no vaginal bleeding or discharge.  On my exam she appeared uncomfortable but was in no acute distress she was hemodynamically normal and afebrile.  She had localized tenderness in the left lower abdomen without rebound or guarding.  No suprapubic pain.    ED course and Plan:  I reviewed her medical  records including her visit in the department in March 2019 for syncopal episode.  I also reviewed her evaluation in 2017 when she presented with right lower quad pain.  Most recent CT imaging from September 22 in September 26, 2017 showed a 5 mm stone which was surgically intervened and a 2 mm nonobstructing stone in the left kidney.(See medical record for additional details in report by the interpreting radiologist).  We discussed possible causes for her pain including ovarian cyst, torsion, renal ureteral colic, colonic spasm versus other acute process.  She was offered IV Toradol for comfort.    Work-up in the emergency department today revealed large microscopic hematuria, negative urine pregnancy, normal renal function.  Normal hemogram.  We discussed options for care.  Patient and  agreed to go home with watchful waiting expectant management with symptoms suspicious for possible ovarian cyst/ureteral renal colic with known history of kidney stones.  Patient seemed to have marked relief of her discomfort after receiving Toradol and Dilaudid.  We agreed to forego imaging today with 2 prior CT imaging studies in 2017  a known small left renal stone in 2017 and understands that if her symptoms worsen she may return for further re-evaluation and additional care..    Patient is discharged home with oral Toradol and Dilaudid for pain and discomfort as needed and Zofran for nausea.  We discussed and reviewed reasons to return to the department for reevaluation including but not limited to fever, worsening pain vomiting or new concerns.       Disclaimer: This note consists of symbols derived from keyboarding, dictation and/or voice recognition software. As a result, there may be errors in the script that have gone undetected. Please consider this when interpreting information found in this chart.  I have reviewed the nursing notes.    I have reviewed the findings, diagnosis, plan and need for follow up with the  patient.       Discharge Medication List as of 8/18/2019 11:54 AM      START taking these medications    Details   HYDROmorphone (DILAUDID) 2 MG tablet Take 1 tablet (2 mg) by mouth every 6 hours as needed for severe pain, Disp-5 tablet, R-0, Local Print      ketorolac (TORADOL) 10 MG tablet Take 1 tablet (10 mg) by mouth every 6 hours as needed for moderate pain, Disp-7 tablet, R-0, E-Prescribe      ondansetron (ZOFRAN ODT) 4 MG ODT tab Take 1 tablet (4 mg) by mouth every 8 hours as needed for nausea or vomiting, Disp-7 tablet, R-0, E-Prescribe             Final diagnoses:   Left lower quadrant pain - Sudden onset upon awakening at 9 AM.  Suspicious for a kidney stone with history of kidney stones, cannot exclude ovarian process (like a cyst)       8/18/2019   [facility] EMERGENCY DEPARTMENT     Ghassan Waddell MD  08/18/19 2158       Ghassan Waddell MD  08/18/19 2151

## 2019-08-26 ENCOUNTER — MYC REFILL (OUTPATIENT)
Dept: OBGYN | Facility: CLINIC | Age: 34
End: 2019-08-26

## 2019-08-26 DIAGNOSIS — Z01.419 ENCOUNTER FOR GYNECOLOGICAL EXAMINATION WITHOUT ABNORMAL FINDING: ICD-10-CM

## 2019-08-26 RX ORDER — CITALOPRAM HYDROBROMIDE 20 MG/1
20 TABLET ORAL DAILY
Qty: 30 TABLET | Refills: 5 | Status: SHIPPED | OUTPATIENT
Start: 2019-08-26

## 2019-08-26 ASSESSMENT — ANXIETY QUESTIONNAIRES
GAD7 TOTAL SCORE: 6
3. WORRYING TOO MUCH ABOUT DIFFERENT THINGS: SEVERAL DAYS
5. BEING SO RESTLESS THAT IT IS HARD TO SIT STILL: NOT AT ALL
2. NOT BEING ABLE TO STOP OR CONTROL WORRYING: MORE THAN HALF THE DAYS
7. FEELING AFRAID AS IF SOMETHING AWFUL MIGHT HAPPEN: NOT AT ALL
6. BECOMING EASILY ANNOYED OR IRRITABLE: SEVERAL DAYS
IF YOU CHECKED OFF ANY PROBLEMS ON THIS QUESTIONNAIRE, HOW DIFFICULT HAVE THESE PROBLEMS MADE IT FOR YOU TO DO YOUR WORK, TAKE CARE OF THINGS AT HOME, OR GET ALONG WITH OTHER PEOPLE: SOMEWHAT DIFFICULT
1. FEELING NERVOUS, ANXIOUS, OR ON EDGE: SEVERAL DAYS

## 2019-08-26 ASSESSMENT — PATIENT HEALTH QUESTIONNAIRE - PHQ9
SUM OF ALL RESPONSES TO PHQ QUESTIONS 1-9: 3
5. POOR APPETITE OR OVEREATING: SEVERAL DAYS

## 2019-08-26 NOTE — TELEPHONE ENCOUNTER
Rx refill request for Citalopram.  Date last issued was on 01-17-19 for a qty of 30 with 5 refills.    Date of last ov was on 01-17-19.  Last ARDEN-7 score 10, PHQ-9 score 9 on 07-25-18.     Called pt and left message to return my call.  Pt to repeat ARDEN-7 and PHQ-9 when she returns call.    Gail Carrington  [facility] RN

## 2019-08-26 NOTE — TELEPHONE ENCOUNTER
PHQ-9 and ARDEN-7 completed.  Refill generated per protocol.  Patient reminded of yearly visit in 1/2020.    Maine Rasmussen   [facility]  [facility]

## 2019-08-27 ASSESSMENT — ANXIETY QUESTIONNAIRES: GAD7 TOTAL SCORE: 6

## 2020-03-10 ENCOUNTER — HEALTH MAINTENANCE LETTER (OUTPATIENT)
Age: 35
End: 2020-03-10

## 2020-12-27 ENCOUNTER — HEALTH MAINTENANCE LETTER (OUTPATIENT)
Age: 35
End: 2020-12-27

## 2021-04-24 ENCOUNTER — HEALTH MAINTENANCE LETTER (OUTPATIENT)
Age: 36
End: 2021-04-24

## 2021-05-26 ENCOUNTER — RECORDS - HEALTHEAST (OUTPATIENT)
Dept: ADMINISTRATIVE | Facility: CLINIC | Age: 36
End: 2021-05-26

## 2021-05-27 ENCOUNTER — RECORDS - HEALTHEAST (OUTPATIENT)
Dept: ADMINISTRATIVE | Facility: CLINIC | Age: 36
End: 2021-05-27

## 2021-05-28 ENCOUNTER — RECORDS - HEALTHEAST (OUTPATIENT)
Dept: ADMINISTRATIVE | Facility: CLINIC | Age: 36
End: 2021-05-28

## 2021-05-30 ENCOUNTER — RECORDS - HEALTHEAST (OUTPATIENT)
Dept: ADMINISTRATIVE | Facility: CLINIC | Age: 36
End: 2021-05-30

## 2021-06-01 ENCOUNTER — RECORDS - HEALTHEAST (OUTPATIENT)
Dept: ADMINISTRATIVE | Facility: CLINIC | Age: 36
End: 2021-06-01

## 2021-10-09 ENCOUNTER — HEALTH MAINTENANCE LETTER (OUTPATIENT)
Age: 36
End: 2021-10-09

## 2022-05-16 ENCOUNTER — HEALTH MAINTENANCE LETTER (OUTPATIENT)
Age: 37
End: 2022-05-16

## 2022-09-11 ENCOUNTER — HEALTH MAINTENANCE LETTER (OUTPATIENT)
Age: 37
End: 2022-09-11

## 2023-06-03 ENCOUNTER — HEALTH MAINTENANCE LETTER (OUTPATIENT)
Age: 38
End: 2023-06-03

## (undated) DEVICE — GUIDEWIRE SENSOR DUAL FLEX STR 0.035"X150CM M0066703080

## (undated) DEVICE — LASER FIBER HOLMIUM FLEXIVA 200UM M0068403910 840-391

## (undated) DEVICE — CATH URETERAL OPEN END 05FR 70CM 020015

## (undated) DEVICE — PACK CYSTO CUSTOM ASC

## (undated) DEVICE — DRAPE GYN/UROLOGY FLUID POUCH TUR 29455

## (undated) DEVICE — RAD RX CONRAY 60% (50ML) CHARGE PER ML

## (undated) DEVICE — SPECIMEN CONTAINER 5OZ STERILE 2600SA

## (undated) DEVICE — ADAPTER CATH CHECK-FLO 9FR FLL 050885 G15476

## (undated) DEVICE — SHEATH URETERAL ACCESS NAVIGATOR HD 11/13FRX36CM M0062502220

## (undated) DEVICE — BASKET NITINOL TIPLESS HALO  1.5FRX120CM 554120

## (undated) DEVICE — GLOVE PROTEXIS W/NEU-THERA 7.5  2D73TE75

## (undated) DEVICE — PAD CHUX UNDERPAD 30X30"

## (undated) DEVICE — LINEN TOWEL PACK X5 5464

## (undated) DEVICE — SOL NACL 0.9% IRRIG 3000ML BAG 2B7477

## (undated) DEVICE — DRAPE C-ARM W/STRAPS 42X72" 07-CA104

## (undated) RX ORDER — PROPOFOL 10 MG/ML
INJECTION, EMULSION INTRAVENOUS
Status: DISPENSED
Start: 2018-02-01

## (undated) RX ORDER — KETOROLAC TROMETHAMINE 30 MG/ML
INJECTION, SOLUTION INTRAMUSCULAR; INTRAVENOUS
Status: DISPENSED
Start: 2018-02-01

## (undated) RX ORDER — FENTANYL CITRATE 50 UG/ML
INJECTION, SOLUTION INTRAMUSCULAR; INTRAVENOUS
Status: DISPENSED
Start: 2018-02-01

## (undated) RX ORDER — ACETAMINOPHEN 325 MG/1
TABLET ORAL
Status: DISPENSED
Start: 2018-02-01

## (undated) RX ORDER — GABAPENTIN 300 MG/1
CAPSULE ORAL
Status: DISPENSED
Start: 2018-02-01

## (undated) RX ORDER — ONDANSETRON 2 MG/ML
INJECTION INTRAMUSCULAR; INTRAVENOUS
Status: DISPENSED
Start: 2018-02-01

## (undated) RX ORDER — DEXAMETHASONE SODIUM PHOSPHATE 4 MG/ML
INJECTION, SOLUTION INTRA-ARTICULAR; INTRALESIONAL; INTRAMUSCULAR; INTRAVENOUS; SOFT TISSUE
Status: DISPENSED
Start: 2018-02-01

## (undated) RX ORDER — LIDOCAINE HYDROCHLORIDE 20 MG/ML
INJECTION, SOLUTION EPIDURAL; INFILTRATION; INTRACAUDAL; PERINEURAL
Status: DISPENSED
Start: 2018-02-01